# Patient Record
Sex: FEMALE | Race: WHITE | Employment: PART TIME | URBAN - METROPOLITAN AREA
[De-identification: names, ages, dates, MRNs, and addresses within clinical notes are randomized per-mention and may not be internally consistent; named-entity substitution may affect disease eponyms.]

---

## 2017-02-15 ENCOUNTER — TRANSFERRED RECORDS (OUTPATIENT)
Dept: HEALTH INFORMATION MANAGEMENT | Facility: CLINIC | Age: 52
End: 2017-02-15

## 2017-02-16 ENCOUNTER — TRANSFERRED RECORDS (OUTPATIENT)
Dept: HEALTH INFORMATION MANAGEMENT | Facility: CLINIC | Age: 52
End: 2017-02-16

## 2017-04-12 ENCOUNTER — RADIANT APPOINTMENT (OUTPATIENT)
Dept: MAMMOGRAPHY | Facility: CLINIC | Age: 52
End: 2017-04-12
Attending: FAMILY MEDICINE
Payer: COMMERCIAL

## 2017-04-12 DIAGNOSIS — Z12.31 VISIT FOR SCREENING MAMMOGRAM: ICD-10-CM

## 2017-04-12 PROCEDURE — G0202 SCR MAMMO BI INCL CAD: HCPCS | Mod: TC

## 2017-04-28 DIAGNOSIS — Z00.00 LABORATORY EXAMINATION ORDERED AS PART OF A ROUTINE GENERAL MEDICAL EXAMINATION: ICD-10-CM

## 2017-04-28 LAB
BASOPHILS # BLD AUTO: 0 10E9/L (ref 0–0.2)
BASOPHILS NFR BLD AUTO: 0.2 %
DIFFERENTIAL METHOD BLD: NORMAL
EOSINOPHIL # BLD AUTO: 0.2 10E9/L (ref 0–0.7)
EOSINOPHIL NFR BLD AUTO: 2.8 %
ERYTHROCYTE [DISTWIDTH] IN BLOOD BY AUTOMATED COUNT: 12.5 % (ref 10–15)
HBA1C MFR BLD: 4.9 % (ref 4.3–6)
HCT VFR BLD AUTO: 41 % (ref 35–47)
HGB BLD-MCNC: 14.6 G/DL (ref 11.7–15.7)
LYMPHOCYTES # BLD AUTO: 1.5 10E9/L (ref 0.8–5.3)
LYMPHOCYTES NFR BLD AUTO: 26.3 %
MCH RBC QN AUTO: 31.7 PG (ref 26.5–33)
MCHC RBC AUTO-ENTMCNC: 35.6 G/DL (ref 31.5–36.5)
MCV RBC AUTO: 89 FL (ref 78–100)
MONOCYTES # BLD AUTO: 0.8 10E9/L (ref 0–1.3)
MONOCYTES NFR BLD AUTO: 13.1 %
NEUTROPHILS # BLD AUTO: 3.3 10E9/L (ref 1.6–8.3)
NEUTROPHILS NFR BLD AUTO: 57.6 %
PLATELET # BLD AUTO: 320 10E9/L (ref 150–450)
RBC # BLD AUTO: 4.61 10E12/L (ref 3.8–5.2)
T3FREE SERPL-MCNC: 2.9 PG/ML (ref 2.3–4.2)
WBC # BLD AUTO: 5.7 10E9/L (ref 4–11)

## 2017-04-28 PROCEDURE — 80048 BASIC METABOLIC PNL TOTAL CA: CPT | Performed by: FAMILY MEDICINE

## 2017-04-28 PROCEDURE — 84443 ASSAY THYROID STIM HORMONE: CPT | Performed by: FAMILY MEDICINE

## 2017-04-28 PROCEDURE — 80076 HEPATIC FUNCTION PANEL: CPT | Performed by: FAMILY MEDICINE

## 2017-04-28 PROCEDURE — 80061 LIPID PANEL: CPT | Performed by: FAMILY MEDICINE

## 2017-04-28 PROCEDURE — 36415 COLL VENOUS BLD VENIPUNCTURE: CPT | Performed by: FAMILY MEDICINE

## 2017-04-28 PROCEDURE — 84481 FREE ASSAY (FT-3): CPT | Performed by: FAMILY MEDICINE

## 2017-04-28 PROCEDURE — 84439 ASSAY OF FREE THYROXINE: CPT | Performed by: FAMILY MEDICINE

## 2017-04-28 PROCEDURE — 83036 HEMOGLOBIN GLYCOSYLATED A1C: CPT | Performed by: FAMILY MEDICINE

## 2017-04-28 PROCEDURE — 85025 COMPLETE CBC W/AUTO DIFF WBC: CPT | Performed by: FAMILY MEDICINE

## 2017-04-29 LAB
ALBUMIN SERPL-MCNC: 4.2 G/DL (ref 3.4–5)
ALP SERPL-CCNC: 31 U/L (ref 40–150)
ALT SERPL W P-5'-P-CCNC: 17 U/L (ref 0–50)
ANION GAP SERPL CALCULATED.3IONS-SCNC: 11 MMOL/L (ref 3–14)
AST SERPL W P-5'-P-CCNC: 13 U/L (ref 0–45)
BILIRUB DIRECT SERPL-MCNC: 0.1 MG/DL (ref 0–0.2)
BILIRUB SERPL-MCNC: 0.9 MG/DL (ref 0.2–1.3)
BUN SERPL-MCNC: 16 MG/DL (ref 7–30)
CALCIUM SERPL-MCNC: 9.3 MG/DL (ref 8.5–10.1)
CHLORIDE SERPL-SCNC: 102 MMOL/L (ref 94–109)
CHOLEST SERPL-MCNC: 204 MG/DL
CO2 SERPL-SCNC: 26 MMOL/L (ref 20–32)
CREAT SERPL-MCNC: 0.83 MG/DL (ref 0.52–1.04)
GFR SERPL CREATININE-BSD FRML MDRD: 73 ML/MIN/1.7M2
GLUCOSE SERPL-MCNC: 95 MG/DL (ref 70–99)
HDLC SERPL-MCNC: 58 MG/DL
LDLC SERPL CALC-MCNC: 130 MG/DL
NONHDLC SERPL-MCNC: 146 MG/DL
POTASSIUM SERPL-SCNC: 3.6 MMOL/L (ref 3.4–5.3)
PROT SERPL-MCNC: 7.7 G/DL (ref 6.8–8.8)
SODIUM SERPL-SCNC: 139 MMOL/L (ref 133–144)
T4 FREE SERPL-MCNC: 0.87 NG/DL (ref 0.76–1.46)
TRIGL SERPL-MCNC: 78 MG/DL
TSH SERPL DL<=0.05 MIU/L-ACNC: 6.17 MU/L (ref 0.4–4)

## 2017-05-09 ENCOUNTER — OFFICE VISIT (OUTPATIENT)
Dept: OTHER | Facility: CLINIC | Age: 52
End: 2017-05-09
Attending: INTERNAL MEDICINE
Payer: COMMERCIAL

## 2017-05-09 VITALS
SYSTOLIC BLOOD PRESSURE: 105 MMHG | OXYGEN SATURATION: 97 % | HEART RATE: 72 BPM | DIASTOLIC BLOOD PRESSURE: 69 MMHG | WEIGHT: 128 LBS | BODY MASS INDEX: 22.32 KG/M2

## 2017-05-09 DIAGNOSIS — Z00.00 ROUTINE GENERAL MEDICAL EXAMINATION AT A HEALTH CARE FACILITY: Primary | ICD-10-CM

## 2017-05-09 DIAGNOSIS — I10 HYPERTENSION GOAL BP (BLOOD PRESSURE) < 130/80: ICD-10-CM

## 2017-05-09 DIAGNOSIS — Z13.6 CARDIOVASCULAR SCREENING; LDL GOAL LESS THAN 130: ICD-10-CM

## 2017-05-09 DIAGNOSIS — R94.6 BORDERLINE ABNORMAL TFTS: ICD-10-CM

## 2017-05-09 PROCEDURE — 99211 OFF/OP EST MAY X REQ PHY/QHP: CPT

## 2017-05-09 PROCEDURE — 99396 PREV VISIT EST AGE 40-64: CPT | Mod: ZP | Performed by: INTERNAL MEDICINE

## 2017-05-09 RX ORDER — HYDROCHLOROTHIAZIDE 25 MG/1
25 TABLET ORAL EVERY MORNING
Qty: 90 TABLET | Refills: 3 | Status: SHIPPED | OUTPATIENT
Start: 2017-05-09 | End: 2018-04-04

## 2017-05-09 RX ORDER — LISINOPRIL 40 MG/1
40 TABLET ORAL DAILY
Qty: 90 TABLET | Refills: 3 | Status: SHIPPED | OUTPATIENT
Start: 2017-05-09 | End: 2018-03-22

## 2017-05-09 NOTE — MR AVS SNAPSHOT
After Visit Summary   5/9/2017    Paty Johnson    MRN: [id number]           Patient Information     Date Of Birth          1965        Visit Information        Provider Department      5/9/2017 2:00 PM Casa Berg MD [facility]      Today's Diagnoses     Routine general medical examination at a health care facility    -  1    Hypertension goal BP (blood pressure) < 130/80        Asymptomatic borderline abnormal TFTs with normal thyroid U/S        CARDIOVASCULAR SCREENING; LDL GOAL LESS THAN 130           Follow-ups after your visit        Additional Services     Follow-Up with Vascular Medicine                 Your next 10 appointments already scheduled     May 09, 2017  2:00 PM CDT   Return Visit with Casa Berg MD   [facility] ([facility])    [address]  [facility] [id number] [phone]              Future tests that were ordered for you today     Open Future Orders        Priority Expected Expires Ordered    OFFICE/OUTPT VISIT,EST,LEVL III Routine 11/9/2017 12/9/2017 5/9/2017    T4 free Routine 11/9/2017 12/9/2017 5/9/2017    T3 Free Routine 11/9/2017 12/9/2017 5/9/2017    TSH Routine 11/9/2017 12/9/2017 5/9/2017    Follow-Up with Vascular Medicine Routine 11/9/2017 12/9/2017 5/9/2017    Lipoprotein Particle NMR Profile Routine 11/9/2017 12/9/2017 5/9/2017            Who to contact     If you have questions or need follow up information about today's clinic visit or your schedule please contact [facility] directly at [phone].  Normal or non-critical lab and imaging results will be communicated to you by MyChart, letter or phone within 4 business days after the clinic has received the results. If you do not hear from us within 7 days, please contact the clinic through [email or web address]  or phone. If you have a critical or abnormal lab result, we will notify you by phone as soon as possible.  Submit refill requests through Epoque or call your pharmacy and they will forward the refill request to us. Please allow 3 business days for your refill to be completed.          Additional Information About Your Visit        VideoAvatarshart Information     Epoque gives you secure access to your electronic health record. If you see a primary care provider, you can also send messages to your care team and make appointments. If you have questions, please call your primary care clinic.  If you do not have a primary care provider, please call [phone] and they will assist you.        Care EveryWhere ID     This is your Care EveryWhere ID. This could be used by other organizations to access your [place] medical records  [id number]        Your Vitals Were     Pulse Pulse Oximetry Breastfeeding? BMI (Body Mass Index)          72 97% No 22.32 kg/m2         Blood Pressure from Last 3 Encounters:   05/09/17 105/69   12/17/16 120/80   09/13/16 102/76    Weight from Last 3 Encounters:   05/09/17 128 lb (58.1 kg)   12/17/16 127 lb (57.6 kg)   09/13/16 127 lb 11.2 oz (57.9 kg)              We Performed the Following     Follow-Up with Vascular Medicine          Where to get your medicines      These medications were sent to [facility] - [place], [place] - [address] AT [address] [facility]  [address] [facility], [facility] [address]     Phone:  [phone]     hydrochlorothiazide 25 MG tablet    lisinopril 40 MG tablet          Primary Care Provider Office Phone # Fax #    Casa Berg [phone]       [facility] [address]  [place] [address]        Thank you!     Thank you for choosing [facility] [place]  for your care. Our goal is always to provide you with excellent care. Hearing back from our patients is one way we can  continue to improve our services. Please take a few minutes to complete the written survey that you may receive in the mail after your visit with us. Thank you!             Your Updated Medication List - Protect others around you: Learn how to safely use, store and throw away your medicines at www.disposemymeds.org.          This list is accurate as of: 5/9/17  1:46 PM.  Always use your most recent med list.                   Brand Name Dispense Instructions for use    hydrochlorothiazide 25 MG tablet    HYDRODIURIL    90 tablet    Take 1 tablet (25 mg) by mouth every morning       lisinopril 40 MG tablet    PRINIVIL/ZESTRIL    90 tablet    Take 1 tablet (40 mg) by mouth daily

## 2017-05-09 NOTE — PROGRESS NOTES
SUBJECTIVE:    CC: Paty Johnson is a 51 year old female who presents for:       Routine general medical examination at a health care facility  Hypertension goal BP (blood pressure) < 130/80  Borderline abnormal TFTs  CARDIOVASCULAR SCREENING; LDL GOAL LESS THAN 130        PAST MEDICAL HISTORY:                  Past Medical History:   Diagnosis Date     Carcinoma in situ of cervix uteri 1993    treated with Effudex for 10 weeks     Irregular menstrual cycle     no infertility eval or problems     Unspecified essential hypertension 2007       PAST SURGICAL HISTORY:                  Past Surgical History:   Procedure Laterality Date     C NONSPECIFIC PROCEDURE  1993    laser ablation for cervix       CURRENT MEDICATIONS:                  Current Outpatient Prescriptions   Medication Sig Dispense Refill     hydrochlorothiazide (HYDRODIURIL) 25 MG tablet Take 1 tablet (25 mg) by mouth every morning 90 tablet 3     lisinopril (PRINIVIL/ZESTRIL) 40 MG tablet Take 1 tablet (40 mg) by mouth daily 90 tablet 3     [DISCONTINUED] hydrochlorothiazide (HYDRODIURIL) 25 MG tablet Take 1 tablet (25 mg) by mouth every morning 90 tablet 3     [DISCONTINUED] lisinopril (PRINIVIL,ZESTRIL) 40 MG tablet Take 1 tablet (40 mg) by mouth daily 90 tablet 3       ALLERGIES:                  Allergies   Allergen Reactions     Nkda [No Known Drug Allergies]        SOCIAL HISTORY:                  Social History     Social History     Marital status:      Spouse name: Mac     Number of children: 3     Years of education: 16     Occupational History      None       Homemaker     Social History Main Topics     Smoking status: Never Smoker     Smokeless tobacco: Never Used     Alcohol use Yes      Comment: 1-2 drinks per week     Drug use: No     Sexual activity: Yes     Partners: Male     Birth control/ protection: Surgical      Comment:  had a vasectomy      Other Topics Concern     Exercise Yes     Seat Belt Yes      Parent/Sibling W/ Cabg, Mi Or Angioplasty Before 65f 55m? No     Social History Narrative       FAMILY HISTORY:                   Family History   Problem Relation Age of Onset     Arthritis Sister      also has lupus     Respiratory Maternal Grandfather      Breast Cancer Paternal Grandmother      Cancer - colorectal Paternal Grandfather      C.A.D. No family hx of      CEREBROVASCULAR DISEASE No family hx of      DIABETES No family hx of      Endocrine Disease Mother      prediabetes, neuropathy, diet controlled     Hypertension Father      takes meds             CONSTITUTIONAL: no malaise, fatigue, or other general symptoms  EYES: no subjective changes in visual acuity, no photophobia  ENT/MOUTH: no complaints of rhinorrhea, nasal congestion, sore throat, hearing changes  RESP: no SOB  CV: no c/o exertional chest pressure or QUIROZ  GI: No abdominal pain, constipation, change in bowel movements, nausea, pyrosis, BRBPR  : no polyuria or polydipsia, no dysuria, no gross hematuria  MUSCULOSKELETAL: no arthalgias or myalgias  INTEGUMENTARY/SKIN: no pruritis, rashes, or moles with recent change in size, shape, or pigmentation  BREAST: no lumps, masses, or discharges  NEURO: no gross sensory or motor symptoms, no dizziness, no confusion  ENDOCRINE: no polyuria or polydipsia, no heat or cold intolerance  HEME/ALLERGY/IMMUNE: no fevers, chills, night sweats, or unwanted weight loss  PSYCHIATRIC: no depression, anxiety, or internal stimuli    EXAM:    /69 (BP Location: Right arm, Patient Position: Chair, Cuff Size: Adult Regular)  Pulse 72  Wt 128 lb (58.1 kg)  SpO2 97%  Breastfeeding? No  BMI 22.32 kg/m2  BMI: Body mass index is 22.32 kg/(m^2).    GENERAL APPEARANCE:healthy, alert and no distress    ORGAN EXAMS:               EYES: clear conjunctiva, no cataracts, no obvious fundoscopic abnormalities               HENT: oropharynx, nares, and TMs are WNL               NECK: no JVD, thyromegaly or  lymphadenopathy, no cervical bruits               RESP: clear to auscultation without rales, wheezes, or rhonchi               CV: RRR, no murmurs, gallops, or rubs               LYMPH: no cervical , axillary, or inguinal lymphadenopathy appreciated               GI: NABS, ND/NT, no masses or organomegally appreciated               MS: no obvoius clinicallly relevant arthropathy, no evidence vasculitis               SKIN: no nevi clinically suspicious for malignancy are noted               NEURO: CN II-XII intact, no localizing sensory or motor abnoramlities noted, DTRs symmetrical bilaterally               PSYCH: Mental status exam reveals the pt to have normal mood and affect. There is no disorder of thought form or content. There is no response to internal stimuli. There is no suicidal or homicidal ideation.     ASSESSMENT/PLAN    (Z00.00) Routine general medical examination at a health care facility  (primary encounter diagnosis)  Comment: get annual gyn screenings  Plan: rtc one year    (I10) Hypertension goal BP (blood pressure) < 130/80  Comment: at goal  Plan: hydrochlorothiazide (HYDRODIURIL) 25 MG tablet,        lisinopril (PRINIVIL/ZESTRIL) 40 MG tablet,         Follow-Up with Vascular Medicine, Lipoprotein         Particle NMR Profile, OFFICE/OUTPT         VISIT,EST,LEVL III           (R94.6) Asymptomatic borderline abnormal TFTs with normal thyroid U/S  Comment: completely asx, simply monitor  Plan: Follow-Up with Vascular Medicine, T4 free, T3         Free, TSH, OFFICE/OUTPT VISIT,EST,LEVL III           (Z13.6) CARDIOVASCULAR SCREENING; LDL GOAL LESS THAN 130  Comment: LDL is 130, advised to exercise more  Plan: Follow-Up with Vascular Medicine, Lipoprotein         Particle NMR Profile, OFFICE/OUTPT         VISIT,EST,LEVL III            Greater than one half the 15 minutes not spent on preventive care during this visit was spent providing aducation and counselling regarding item(s) # 2 onward as  delineated above.                        I have discussed with patient the risks, benefits, medications, treatment options and modalities.   I have instructed the patient to call or schedule a follow-up appointment if any problems or failure to improve.

## 2017-05-09 NOTE — NURSING NOTE
"Chief Complaint   Patient presents with     RECHECK     follow up labs       Initial /69 (BP Location: Right arm, Patient Position: Chair, Cuff Size: Adult Regular)  Pulse 72  Wt 128 lb (58.1 kg)  SpO2 97%  Breastfeeding? No  BMI 22.32 kg/m2 Estimated body mass index is 22.32 kg/(m^2) as calculated from the following:    Height as of 9/13/16: 5' 3.5\" (1.613 m).    Weight as of this encounter: 128 lb (58.1 kg).  Medication Reconciliation: complete     Face to face nursing time: 8 minutes    Danielle Ren MA     "

## 2017-10-04 ENCOUNTER — TRANSFERRED RECORDS (OUTPATIENT)
Dept: HEALTH INFORMATION MANAGEMENT | Facility: CLINIC | Age: 52
End: 2017-10-04

## 2017-10-09 ENCOUNTER — TRANSFERRED RECORDS (OUTPATIENT)
Dept: HEALTH INFORMATION MANAGEMENT | Facility: CLINIC | Age: 52
End: 2017-10-09

## 2017-10-18 DIAGNOSIS — Z13.6 CARDIOVASCULAR SCREENING; LDL GOAL LESS THAN 130: ICD-10-CM

## 2017-10-18 DIAGNOSIS — R94.6 BORDERLINE ABNORMAL TFTS: ICD-10-CM

## 2017-10-18 DIAGNOSIS — I10 HYPERTENSION GOAL BP (BLOOD PRESSURE) < 130/80: ICD-10-CM

## 2017-10-18 LAB — T3FREE SERPL-MCNC: 2.9 PG/ML (ref 2.3–4.2)

## 2017-10-18 PROCEDURE — 80061 LIPID PANEL: CPT | Mod: 90 | Performed by: FAMILY MEDICINE

## 2017-10-18 PROCEDURE — 99000 SPECIMEN HANDLING OFFICE-LAB: CPT | Performed by: FAMILY MEDICINE

## 2017-10-18 PROCEDURE — 84443 ASSAY THYROID STIM HORMONE: CPT | Performed by: FAMILY MEDICINE

## 2017-10-18 PROCEDURE — 83704 LIPOPROTEIN BLD QUAN PART: CPT | Mod: 90 | Performed by: FAMILY MEDICINE

## 2017-10-18 PROCEDURE — 36415 COLL VENOUS BLD VENIPUNCTURE: CPT | Performed by: FAMILY MEDICINE

## 2017-10-18 PROCEDURE — 84481 FREE ASSAY (FT-3): CPT | Performed by: FAMILY MEDICINE

## 2017-10-18 PROCEDURE — 84439 ASSAY OF FREE THYROXINE: CPT | Performed by: FAMILY MEDICINE

## 2017-10-19 LAB
T4 FREE SERPL-MCNC: 0.82 NG/DL (ref 0.76–1.46)
TSH SERPL DL<=0.005 MIU/L-ACNC: 5.96 MU/L (ref 0.4–4)

## 2017-10-23 ENCOUNTER — TELEPHONE (OUTPATIENT)
Dept: FAMILY MEDICINE | Facility: CLINIC | Age: 52
End: 2017-10-23

## 2017-11-03 ENCOUNTER — TRANSFERRED RECORDS (OUTPATIENT)
Dept: HEALTH INFORMATION MANAGEMENT | Facility: CLINIC | Age: 52
End: 2017-11-03

## 2017-11-09 ENCOUNTER — THERAPY VISIT (OUTPATIENT)
Dept: PHYSICAL THERAPY | Facility: CLINIC | Age: 52
End: 2017-11-09
Payer: COMMERCIAL

## 2017-11-09 DIAGNOSIS — M25.511 ACUTE PAIN OF RIGHT SHOULDER: Primary | ICD-10-CM

## 2017-11-09 DIAGNOSIS — M25.311 SHOULDER INSTABILITY, RIGHT: ICD-10-CM

## 2017-11-09 PROCEDURE — 97112 NEUROMUSCULAR REEDUCATION: CPT | Mod: GP | Performed by: PHYSICAL THERAPIST

## 2017-11-09 PROCEDURE — 97161 PT EVAL LOW COMPLEX 20 MIN: CPT | Mod: GP | Performed by: PHYSICAL THERAPIST

## 2017-11-09 PROCEDURE — 97110 THERAPEUTIC EXERCISES: CPT | Mod: GP | Performed by: PHYSICAL THERAPIST

## 2017-11-09 NOTE — LETTER
[facility]  [address]  [place] [address]  [phone]    November 10, 2017    Re: Paty Johnson   :   1965  MRN:  [id number]   REFERRING PHYSICIAN:   Mike Rodrigez  [facility]  Date of Initial Evaluation:  2017  Visits:  Rxs Used: 1  Reason for Referral:   Acute pain of right shoulder  Shoulder instability, right    EVALUATION SUMMARY    Subjective:  Patient is a 52 year old female presenting with rehab right shoulder hpi.   Paty Johnson is a 52 year old female with a right shoulder condition.    Condition occurred: during recreation/sport.  This is a new condition  17 waterskiing dislocated shoulder .    Patient reports pain:  Posterior.  Radiates to:  Cervical.  Pain is described as sharp and aching and is intermittent and reported as 6/10.  Associated symptoms:  Dislocating/subluxing, tingling and numbness. Pain is worse during the night.  Symptoms are exacerbated by lying on extremity, using arm at shoulder level, using arm overhead and lifting Relieved by: activity avoidance.  Since onset symptoms are gradually improving.  Special tests:  X-ray and MRI.      General health as reported by patient is good.  Pertinent medical history includes:  High blood pressure.  Medical allergies: no.  Other surgeries include:  None reported.  Current medications:  High blood pressure medication.  Current occupation is Part time admin.  Patient is working in normal job without restrictions.  Primary job tasks include:  Prolonged sitting and repetitive tasks.  Barriers include:  None as reported by the patient.  Red flags:  None as reported by the patient.     Objective:   Shoulder Evaluation:  ROM:  AROM:    Flexion:  Left:  180    Right:  165  Abduction:  Right:  Endrange lmitation  Internal Rotation:  Left:  70    Right:  40  External Rotation:  Left:  100    Right:  85  Extension/Internal Rotation:  Left:   T6    Right:  T12    Stability Testing:    Right shoulder stability positive testing:Apprehension and Relocation  Right shoulder stability negative testing:  Load and shift anterior and Load and shift posterior  Special Tests:    Right shoulder positive for the following special tests:Labral and Impingement  Right shoulder negative for the following special tests:Rotator cuff tear and Acrimioclavicular    Assessment/Plan:    Patient is a 52 year old female with right side shoulder complaints.    Patient has the following significant findings with corresponding treatment plan.                Diagnosis 1:  R shoulder instability s/p dislocation  Pain -  hot/cold therapy, manual therapy, education, directional preference exercise and home program  Decreased ROM/flexibility - manual therapy, therapeutic exercise, therapeutic activity and home program  Re: Paty Mehul [place] :   1965    Decreased joint mobility - manual therapy, therapeutic exercise, therapeutic activity and home program  Decreased strength - therapeutic exercise, therapeutic activities and home program  Impaired muscle performance - neuro re-education and home program  Decreased function - therapeutic activities and home program  Therapy Evaluation Codes:   1) History comprised of:   Personal factors that impact the plan of care:      Past/current experiences.    Comorbidity factors that impact the plan of care are:      High blood pressure.     Medications impacting care: High blood pressure.  2) Examination of Body Systems comprised of:   Body structures and functions that impact the plan of care:      Shoulder.   Activity limitations that impact the plan of care are:      Bathing, Cooking, Driving, Dressing, Lifting, Sports, Throwing, Sleeping and Laying down.  3) Clinical presentation characteristics are:   Stable/Uncomplicated.  4) Decision-Making    Moderate complexity using standardized patient assessment instrument and/or measureable  assessment of functional outcome.  Cumulative Therapy Evaluation is: Low complexity.  Previous and current functional limitations:  (See Goal Flow Sheet for this information)    Short term and Long term goals: (See Goal Flow Sheet for this information)   Communication ability:  Patient appears to be able to clearly communicate and understand verbal and written communication and follow directions correctly.  Treatment Explanation - The following has been discussed with the patient:   RX ordered/plan of care. Anticipated outcomes  Possible risks and side effects  This patient would benefit from PT intervention to resume normal activities.   Rehab potential is good.  Frequency:  1-2 X a month, once daily  Duration:  for 1 months  Discharge Plan:  Achieve all LTG.  Independent in home treatment program.  Reach maximal therapeutic benefit.        Thank you for your referral.    INQUIRIES  Therapist: ABDIEL Pineda   [facility]  [address] [place] [address]  Phone: [phone]  Fax: [phone]

## 2017-11-09 NOTE — MR AVS SNAPSHOT
After Visit Summary   11/9/2017    Paty Johnson    MRN: [id number]           Patient Information     Date Of Birth          1965        Visit Information        Provider Department      11/9/2017 10:40 AM Jessica Ledezma PT [facility]        Today's Diagnoses     Acute pain of right shoulder    -  1    Shoulder instability, right           Follow-ups after your visit        Your next 10 appointments already scheduled     Nov 14, 2017  2:30 PM CST   Return Visit with Casa Berg MD   [facility] ([facility])    [address]  [place] [id number] [phone]              Who to contact     If you have questions or need follow up information about today's clinic visit or your schedule please contact [facility] directly at [phone].  Normal or non-critical lab and imaging results will be communicated to you by MyChart, letter or phone within 4 business days after the clinic has received the results. If you do not hear from us within 7 days, please contact the clinic through Wochithart or phone. If you have a critical or abnormal lab result, we will notify you by phone as soon as possible.  Submit refill requests through Skribit or call your pharmacy and they will forward the refill request to us. Please allow 3 business days for your refill to be completed.          Additional Information About Your Visit        MyChart Information     Skribit gives you secure access to your electronic health record. If you see a primary care provider, you can also send messages to your care team and make appointments. If you have questions, please call your primary care clinic.  If you do not have a primary care provider, please call [phone] and they will assist you.        Care EveryWhere ID     This is your Care EveryWhere ID. This could  be used by other organizations to access your [facility] medical records  [id number]         Blood Pressure from Last 3 Encounters:   05/09/17 105/69   12/17/16 120/80   09/13/16 102/76    Weight from Last 3 Encounters:   05/09/17 58.1 kg (128 lb)   12/17/16 57.6 kg (127 lb)   09/13/16 57.9 kg (127 lb 11.2 oz)              We Performed the Following     HC PT EVAL, LOW COMPLEXITY     JEANNIE INITIAL EVAL REPORT     NEUROMUSCULAR RE-EDUCATION     THERAPEUTIC EXERCISES        Primary Care Provider Office Phone # Fax #    Volodymyrserena Eyal Berg [phone]       [facility] [address]  [place] [address]        Equal Access to Services     CECIL ALSTON : Hadii olya brooke hadasho Soomaali, waaxda luqadaha, qaybta kaalmada adeegyada, abraham khan. So [facility] [phone].    ATENCIÓN: Si habla español, tiene a sauceda disposición servicios gratuitos de asistencia lingüística. Llame al [phone].    We comply with applicable federal civil rights laws and [place] laws. We do not discriminate on the basis of race, color, national origin, age, disability, sex, sexual orientation, or gender identity.            Thank you!     Thank you for choosing [facility]  for your care. Our goal is always to provide you with excellent care. Hearing back from our patients is one way we can continue to improve our services. Please take a few minutes to complete the written survey that you may receive in the mail after your visit with us. Thank you!             Your Updated Medication List - Protect others around you: Learn how to safely use, store and throw away your medicines at www.disposemymeds.org.          This list is accurate as of: 11/9/17  2:20 PM.  Always use your most recent med list.                   Brand Name Dispense Instructions for use Diagnosis    hydrochlorothiazide 25 MG tablet    HYDRODIURIL    90 tablet    Take 1 tablet (25 mg) by  mouth every morning    Hypertension goal BP (blood pressure) < 130/80       lisinopril 40 MG tablet    PRINIVIL/ZESTRIL    90 tablet    Take 1 tablet (40 mg) by mouth daily    Hypertension goal BP (blood pressure) < 130/80

## 2017-11-09 NOTE — PROGRESS NOTES
Subjective:    Patient is a 52 year old female presenting with rehab right shoulder hpi.   Paty Johnson is a 52 year old female with a right shoulder condition.    Condition occurred: during recreation/sport.  This is a new condition  9/17/17 waterskiing dislocated shoulder .    Patient reports pain:  Posterior.  Radiates to:  Cervical.  Pain is described as sharp and aching and is intermittent and reported as 6/10.  Associated symptoms:  Dislocating/subluxing, tingling and numbness. Pain is worse during the night.  Symptoms are exacerbated by lying on extremity, using arm at shoulder level, using arm overhead and lifting Relieved by: activity avoidance.  Since onset symptoms are gradually improving.  Special tests:  X-ray and MRI.      General health as reported by patient is good.  Pertinent medical history includes:  High blood pressure.  Medical allergies: no.  Other surgeries include:  None reported.  Current medications:  High blood pressure medication.  Current occupation is Part time admin.  Patient is working in normal job without restrictions.  Primary job tasks include:  Prolonged sitting and repetitive tasks.    Barriers include:  None as reported by the patient.    Red flags:  None as reported by the patient.                        Objective:    System                   Shoulder Evaluation:  ROM:  AROM:    Flexion:  Left:  180    Right:  165    Abduction:  Right:  Endrange lmitation    Internal Rotation:  Left:  70    Right:  40  External Rotation:  Left:  100    Right:  85            Extension/Internal Rotation:  Left:  T6    Right:  T12            Stability Testing:      Right shoulder stability positive testing:Apprehension and Relocation  Right shoulder stability negative testing:  Load and shift anterior and Load and shift posterior  Special Tests:      Right shoulder positive for the following special tests:Labral and Impingement  Right shoulder negative for the following special  tests:Rotator cuff tear and Acrimioclavicular                                       General     ROS    Assessment/Plan:      Patient is a 52 year old female with right side shoulder complaints.    Patient has the following significant findings with corresponding treatment plan.                Diagnosis 1:  R shoulder instability s/p dislocation  Pain -  hot/cold therapy, manual therapy, education, directional preference exercise and home program  Decreased ROM/flexibility - manual therapy, therapeutic exercise, therapeutic activity and home program  Decreased joint mobility - manual therapy, therapeutic exercise, therapeutic activity and home program  Decreased strength - therapeutic exercise, therapeutic activities and home program  Impaired muscle performance - neuro re-education and home program  Decreased function - therapeutic activities and home program    Therapy Evaluation Codes:   1) History comprised of:   Personal factors that impact the plan of care:      Past/current experiences.    Comorbidity factors that impact the plan of care are:      High blood pressure.     Medications impacting care: High blood pressure.  2) Examination of Body Systems comprised of:   Body structures and functions that impact the plan of care:      Shoulder.   Activity limitations that impact the plan of care are:      Bathing, Cooking, Driving, Dressing, Lifting, Sports, Throwing, Sleeping and Laying down.  3) Clinical presentation characteristics are:   Stable/Uncomplicated.  4) Decision-Making    Moderate complexity using standardized patient assessment instrument and/or measureable assessment of functional outcome.  Cumulative Therapy Evaluation is: Low complexity.    Previous and current functional limitations:  (See Goal Flow Sheet for this information)    Short term and Long term goals: (See Goal Flow Sheet for this information)     Communication ability:  Patient appears to be able to clearly communicate and understand  verbal and written communication and follow directions correctly.  Treatment Explanation - The following has been discussed with the patient:   RX ordered/plan of care  Anticipated outcomes  Possible risks and side effects  This patient would benefit from PT intervention to resume normal activities.   Rehab potential is good.    Frequency:  1-2 X a month, once daily  Duration:  for 1 months  Discharge Plan:  Achieve all LTG.  Independent in home treatment program.  Reach maximal therapeutic benefit.    Please refer to the daily flowsheet for treatment today, total treatment time and time spent performing 1:1 timed codes.

## 2017-11-14 ENCOUNTER — OFFICE VISIT (OUTPATIENT)
Dept: OTHER | Facility: CLINIC | Age: 52
End: 2017-11-14
Attending: INTERNAL MEDICINE
Payer: COMMERCIAL

## 2017-11-14 VITALS
HEART RATE: 80 BPM | DIASTOLIC BLOOD PRESSURE: 76 MMHG | BODY MASS INDEX: 22.64 KG/M2 | HEIGHT: 64 IN | OXYGEN SATURATION: 96 % | SYSTOLIC BLOOD PRESSURE: 115 MMHG | WEIGHT: 132.6 LBS

## 2017-11-14 DIAGNOSIS — R94.6 BORDERLINE ABNORMAL TFTS: ICD-10-CM

## 2017-11-14 DIAGNOSIS — I10 HYPERTENSION GOAL BP (BLOOD PRESSURE) < 130/80: ICD-10-CM

## 2017-11-14 DIAGNOSIS — Z13.6 CARDIOVASCULAR SCREENING; LDL GOAL LESS THAN 130: ICD-10-CM

## 2017-11-14 DIAGNOSIS — Z00.00 ROUTINE GENERAL MEDICAL EXAMINATION AT A HEALTH CARE FACILITY: Primary | ICD-10-CM

## 2017-11-14 PROCEDURE — 99211 OFF/OP EST MAY X REQ PHY/QHP: CPT

## 2017-11-14 PROCEDURE — 99214 OFFICE O/P EST MOD 30 MIN: CPT | Mod: ZP | Performed by: INTERNAL MEDICINE

## 2017-11-14 NOTE — PROGRESS NOTES
Paty Johnson is a 52 year old female who is presenting at the current time to discuss her diagnosi(es) of:       Hypertension goal BP (blood pressure) < 130/80  Borderline abnormal TFTs  CARDIOVASCULAR SCREENING; LDL GOAL LESS THAN 130  Routine general medical examination at a health care facility .      Review Of Systems  Skin: negative  Eyes: negative  Ears/Nose/Throat: negative  Respiratory: No shortness of breath, dyspnea on exertion, cough, or hemoptysis  Cardiovascular: negative  Gastrointestinal: negative  Genitourinary: negative  Musculoskeletal: negative  Neurologic: negative  Psychiatric: negative  Hematologic/Lymphatic/Immunologic: negative  Endocrine: negative    PAST MEDICAL HISTORY:                  Past Medical History:   Diagnosis Date     Carcinoma in situ of cervix uteri 1993    treated with Effudex for 10 weeks     Irregular menstrual cycle     no infertility eval or problems     Unspecified essential hypertension 2007       PAST SURGICAL HISTORY:                  Past Surgical History:   Procedure Laterality Date     C NONSPECIFIC PROCEDURE  1993    laser ablation for cervix       CURRENT MEDICATIONS:                  Current Outpatient Prescriptions   Medication Sig Dispense Refill     hydrochlorothiazide (HYDRODIURIL) 25 MG tablet Take 1 tablet (25 mg) by mouth every morning 90 tablet 3     lisinopril (PRINIVIL/ZESTRIL) 40 MG tablet Take 1 tablet (40 mg) by mouth daily 90 tablet 3       ALLERGIES:                  Allergies   Allergen Reactions     Nkda [No Known Drug Allergies]        SOCIAL HISTORY:                  Social History     Social History     Marital status:      Spouse name: Mac     Number of children: 3     Years of education: 16     Occupational History      None       Homemaker     Social History Main Topics     Smoking status: Never Smoker     Smokeless tobacco: Never Used     Alcohol use Yes      Comment: 1-2 drinks per week     Drug use: No      Sexual activity: Yes     Partners: Male     Birth control/ protection: Surgical      Comment:  had a vasectomy      Other Topics Concern     Exercise Yes     Seat Belt Yes     Parent/Sibling W/ Cabg, Mi Or Angioplasty Before 65f 55m? No     Social History Narrative       FAMILY HISTORY:                   Family History   Problem Relation Age of Onset     Arthritis Sister      also has lupus     Respiratory Maternal Grandfather      Breast Cancer Paternal Grandmother      Cancer - colorectal Paternal Grandfather      Endocrine Disease Mother      prediabetes, neuropathy, diet controlled     Hypertension Father      takes meds     C.A.D. No family hx of      CEREBROVASCULAR DISEASE No family hx of      DIABETES No family hx of         Physical exam Reveals:    O/P: WNL  HEENT: WNL  NECK: No JVD, thyromegaly, or lymphadenopathy  HEART: RRR, no murmurs, gallops, or rubs  LUNGS: CTA bilaterally without rales, wheezes, or rhonchi  GI: NABS, nondistended, nontender, soft  EXT:without cyanosis, clubbing, or edema  NEURO: nonfocal  : no flank tenderness      A/P:    (Z00.00) Routine general medical examination at a health care facility  (primary encounter diagnosis)  Comment: RTC in six months for annual physical  Plan: CBC with platelets differential, T3 Free, T4         free, TSH, Basic metabolic panel, Hepatic         panel, Lipid Profile, Hemoglobin A1c, Thyroid         peroxidase antibody, Thyroid stimulating         immunoglobulin, Anti thyroglobulin antibody            (I10) Hypertension goal BP (blood pressure) < 130/80  Comment: at goal  Plan: Basic metabolic panel           (R94.6) Asymptomatic borderline abnormal TFTs with normal thyroid U/S  Comment: TSH still slightly high; Abs normal, imaging normal, will simply monitor with below labs in six months; Pt is asx, pt is instructed to RTC for sxs suggestive of hypothyroidism  Plan: T3 Free, T4 free, TSH, Thyroid peroxidase         antibody, Thyroid  stimulating immunoglobulin,         Anti thyroglobulin antibody           (Z13.6) CARDIOVASCULAR SCREENING; LDL GOAL LESS THAN 130  Comment: at goal  Plan: Lipid Profile

## 2017-11-14 NOTE — NURSING NOTE
"Chief Complaint   Patient presents with     RECHECK     6 month F/U.       Initial /76 (BP Location: Right arm, Patient Position: Chair, Cuff Size: Adult Regular)  Pulse 80  Ht 5' 4\" (1.626 m)  Wt 132 lb 9.6 oz (60.1 kg)  LMP 10/30/2017 (Within Days)  SpO2 96%  BMI 22.76 kg/m2 Estimated body mass index is 22.76 kg/(m^2) as calculated from the following:    Height as of this encounter: 5' 4\" (1.626 m).    Weight as of this encounter: 132 lb 9.6 oz (60.1 kg).  Medication Reconciliation: complete    Face to Face time 5 minutes  Nano Mercado CMA      "

## 2017-11-14 NOTE — MR AVS SNAPSHOT
After Visit Summary   11/14/2017    Paty Johnson    MRN: [id number]           Patient Information     Date Of Birth          1965        Visit Information        Provider Department      11/14/2017 2:30 PM Casa Berg MD [facility]      Today's Diagnoses     Routine general medical examination at a health care facility    -  1    Hypertension goal BP (blood pressure) < 130/80        Asymptomatic borderline abnormal TFTs with normal thyroid U/S        CARDIOVASCULAR SCREENING; LDL GOAL LESS THAN 130           Follow-ups after your visit        Future tests that were ordered for you today     Open Future Orders        Priority Expected Expires Ordered    CBC with platelets differential Routine 5/14/2018 5/14/2018 11/14/2017    T3 Free Routine 5/14/2018 5/14/2018 11/14/2017    T4 free Routine 5/14/2018 5/14/2018 11/14/2017    TSH Routine 5/14/2018 5/14/2018 11/14/2017    Basic metabolic panel Routine 5/14/2018 5/14/2018 11/14/2017    Hepatic panel Routine 5/14/2018 5/14/2018 11/14/2017    Lipid Profile Routine 5/14/2018 5/14/2018 11/14/2017    Hemoglobin A1c Routine 5/14/2018 5/14/2018 11/14/2017    Thyroid peroxidase antibody Routine 5/14/2018 5/14/2018 11/14/2017    Thyroid stimulating immunoglobulin Routine 5/14/2018 5/14/2018 11/14/2017    Anti thyroglobulin antibody Routine 5/14/2018 5/14/2018 11/14/2017            Who to contact     If you have questions or need follow up information about today's clinic visit or your schedule please contact [facility] directly at [phone].  Normal or non-critical lab and imaging results will be communicated to you by MyChart, letter or phone within 4 business days after the clinic has received the results. If you do not hear from us within 7 days, please contact the clinic through MyChart or phone. If you have a critical or abnormal lab result,  "we will notify you by phone as soon as possible.  Submit refill requests through [employer] or call your pharmacy and they will forward the refill request to us. Please allow 3 business days for your refill to be completed.          Additional Information About Your Visit        [employer] Information     Glance gives you secure access to your electronic health record. If you see a primary care provider, you can also send messages to your care team and make appointments. If you have questions, please call your primary care clinic.  If you do not have a primary care provider, please call [phone] and they will assist you.        Care EveryWhere ID     This is your Care EveryWhere ID. This could be used by other organizations to access your [place] medical records  [id number]        Your Vitals Were     Pulse Height Last Period Pulse Oximetry BMI (Body Mass Index)       80 5' 4\" (1.626 m) 10/30/2017 (Within Days) 96% 22.76 kg/m2        Blood Pressure from Last 3 Encounters:   11/14/17 115/76   05/09/17 105/69   12/17/16 120/80    Weight from Last 3 Encounters:   11/14/17 132 lb 9.6 oz (60.1 kg)   05/09/17 128 lb (58.1 kg)   12/17/16 127 lb (57.6 kg)              We Performed the Following     Follow-Up with Vascular Medicine        Primary Care Provider Office Phone # Fax #    Casa Berg [phone]       [facility] [address]  [place] [address]        Equal Access to Services     CECIL ALSTON : Hadii aad ku hadasho Soomaali, waaxda luqadaha, qaybta kaalmada adeegyada, waxay feltonin analilia la . So [facility] [phone].    ATENCIÓN: Si habla yangañol, tiene a sauceda disposición servicios gratuitos de asistencia lingüística. Llame al [phone].    We comply with applicable federal civil rights laws and [place] laws. We do not discriminate on the basis of race, color, national origin, age, disability, sex, sexual orientation, or gender identity.          "   Thank you!     Thank you for choosing [facility]  for your care. Our goal is always to provide you with excellent care. Hearing back from our patients is one way we can continue to improve our services. Please take a few minutes to complete the written survey that you may receive in the mail after your visit with us. Thank you!             Your Updated Medication List - Protect others around you: Learn how to safely use, store and throw away your medicines at www.disposemymeds.org.          This list is accurate as of: 11/14/17  2:51 PM.  Always use your most recent med list.                   Brand Name Dispense Instructions for use Diagnosis    hydrochlorothiazide 25 MG tablet    HYDRODIURIL    90 tablet    Take 1 tablet (25 mg) by mouth every morning    Hypertension goal BP (blood pressure) < 130/80       lisinopril 40 MG tablet    PRINIVIL/ZESTRIL    90 tablet    Take 1 tablet (40 mg) by mouth daily    Hypertension goal BP (blood pressure) < 130/80

## 2017-11-18 ENCOUNTER — HEALTH MAINTENANCE LETTER (OUTPATIENT)
Age: 52
End: 2017-11-18

## 2017-12-04 ENCOUNTER — THERAPY VISIT (OUTPATIENT)
Dept: PHYSICAL THERAPY | Facility: CLINIC | Age: 52
End: 2017-12-04
Payer: COMMERCIAL

## 2017-12-04 DIAGNOSIS — M25.511 ACUTE PAIN OF RIGHT SHOULDER: ICD-10-CM

## 2017-12-04 DIAGNOSIS — M25.311 SHOULDER INSTABILITY, RIGHT: ICD-10-CM

## 2017-12-04 PROCEDURE — 97110 THERAPEUTIC EXERCISES: CPT | Mod: GP | Performed by: PHYSICAL THERAPIST

## 2017-12-04 PROCEDURE — 97112 NEUROMUSCULAR REEDUCATION: CPT | Mod: GP | Performed by: PHYSICAL THERAPIST

## 2017-12-15 ENCOUNTER — TRANSFERRED RECORDS (OUTPATIENT)
Dept: HEALTH INFORMATION MANAGEMENT | Facility: CLINIC | Age: 52
End: 2017-12-15

## 2017-12-19 ENCOUNTER — THERAPY VISIT (OUTPATIENT)
Dept: PHYSICAL THERAPY | Facility: CLINIC | Age: 52
End: 2017-12-19
Payer: COMMERCIAL

## 2017-12-19 DIAGNOSIS — M25.311 SHOULDER INSTABILITY, RIGHT: ICD-10-CM

## 2017-12-19 DIAGNOSIS — M25.511 ACUTE PAIN OF RIGHT SHOULDER: ICD-10-CM

## 2017-12-19 PROCEDURE — 97110 THERAPEUTIC EXERCISES: CPT | Mod: GP | Performed by: PHYSICAL THERAPIST

## 2017-12-19 PROCEDURE — 97112 NEUROMUSCULAR REEDUCATION: CPT | Mod: GP | Performed by: PHYSICAL THERAPIST

## 2017-12-19 NOTE — MR AVS SNAPSHOT
After Visit Summary   12/19/2017    Paty Johnson    MRN: [id number]           Patient Information     Date Of Birth          1965        Visit Information        Provider Department      12/19/2017 10:30 AM Jessica Ledezma PT [facility]        Today's Diagnoses     Acute pain of right shoulder        Shoulder instability, right           Follow-ups after your visit        Who to contact     If you have questions or need follow up information about today's clinic visit or your schedule please contact [place] [facility] DARRIAN AGU[facility] directly at [phone].  Normal or non-critical lab and imaging results will be communicated to you by Weij[employer]art, letter or phone within 4 business days after the clinic has received the results. If you do not hear from us within 7 days, please contact the clinic through Weijuhart or phone. If you have a critical or abnormal lab result, we will notify you by phone as soon as possible.  Submit refill requests through [employer] or call your pharmacy and they will forward the refill request to us. Please allow 3 business days for your refill to be completed.          Additional Information About Your Visit        MyChart Information     [employer] gives you secure access to your electronic health record. If you see a primary care provider, you can also send messages to your care team and make appointments. If you have questions, please call your primary care clinic.  If you do not have a primary care provider, please call [phone] and they will assist you.        Care EveryWhere ID     This is your Care EveryWhere ID. This could be used by other organizations to access your [place] medical records  [id number]         Blood Pressure from Last 3 Encounters:   11/14/17 115/76   05/09/17 105/69   12/17/16 120/80    Weight from Last 3 Encounters:   11/14/17 60.1 kg (132 lb 9.6 oz)   05/09/17 58.1 kg (128 lb)   12/17/16 57.6 kg  (127 lb)              We Performed the Following     NEUROMUSCULAR RE-EDUCATION     THERAPEUTIC EXERCISES        Primary Care Provider Office Phone # Fax #    Casa Berg [phone]       [facility] [address]  [place] [address]        Equal Access to Services     AMERICOYOEL GAMALIEL : Hadii aad ku hadasho Soomaali, waaxda luqadaha, qaybta kaalmada adeegyada, waxay idiin hayamandan clifford mercedesstarr lavelia . So [facility] [phone].    ATENCIÓN: Si habla español, tiene a sauceda disposición servicios gratuitos de asistencia lingüística. Venita al [phone].    We comply with applicable federal civil rights laws and [place] laws. We do not discriminate on the basis of race, color, national origin, age, disability, sex, sexual orientation, or gender identity.            Thank you!     Thank you for choosing [place] [facility]  for your care. Our goal is always to provide you with excellent care. Hearing back from our patients is one way we can continue to improve our services. Please take a few minutes to complete the written survey that you may receive in the mail after your visit with us. Thank you!             Your Updated Medication List - Protect others around you: Learn how to safely use, store and throw away your medicines at www.disposemymeds.org.          This list is accurate as of: 12/19/17 12:30 PM.  Always use your most recent med list.                   Brand Name Dispense Instructions for use Diagnosis    hydrochlorothiazide 25 MG tablet    HYDRODIURIL    90 tablet    Take 1 tablet (25 mg) by mouth every morning    Hypertension goal BP (blood pressure) < 130/80       lisinopril 40 MG tablet    PRINIVIL/ZESTRIL    90 tablet    Take 1 tablet (40 mg) by mouth daily    Hypertension goal BP (blood pressure) < 130/80

## 2018-01-15 ENCOUNTER — OFFICE VISIT (OUTPATIENT)
Dept: FAMILY MEDICINE | Facility: CLINIC | Age: 53
End: 2018-01-15
Payer: COMMERCIAL

## 2018-01-15 VITALS
TEMPERATURE: 98.7 F | HEIGHT: 66 IN | DIASTOLIC BLOOD PRESSURE: 66 MMHG | WEIGHT: 131 LBS | RESPIRATION RATE: 16 BRPM | BODY MASS INDEX: 21.05 KG/M2 | SYSTOLIC BLOOD PRESSURE: 110 MMHG | HEART RATE: 64 BPM | OXYGEN SATURATION: 98 %

## 2018-01-15 DIAGNOSIS — M25.311 SHOULDER INSTABILITY, RIGHT: ICD-10-CM

## 2018-01-15 DIAGNOSIS — Z00.00 ROUTINE GENERAL MEDICAL EXAMINATION AT A HEALTH CARE FACILITY: ICD-10-CM

## 2018-01-15 DIAGNOSIS — Z01.818 PREOP GENERAL PHYSICAL EXAM: Primary | ICD-10-CM

## 2018-01-15 DIAGNOSIS — I10 HYPERTENSION GOAL BP (BLOOD PRESSURE) < 130/80: ICD-10-CM

## 2018-01-15 LAB
ANION GAP SERPL CALCULATED.3IONS-SCNC: 6 MMOL/L (ref 3–14)
BUN SERPL-MCNC: 17 MG/DL (ref 7–30)
CALCIUM SERPL-MCNC: 9.1 MG/DL (ref 8.5–10.1)
CHLORIDE SERPL-SCNC: 102 MMOL/L (ref 94–109)
CO2 SERPL-SCNC: 30 MMOL/L (ref 20–32)
CREAT SERPL-MCNC: 0.83 MG/DL (ref 0.52–1.04)
GFR SERPL CREATININE-BSD FRML MDRD: 72 ML/MIN/1.7M2
GLUCOSE SERPL-MCNC: 85 MG/DL (ref 70–99)
POTASSIUM SERPL-SCNC: 3.4 MMOL/L (ref 3.4–5.3)
SODIUM SERPL-SCNC: 138 MMOL/L (ref 133–144)

## 2018-01-15 PROCEDURE — 80048 BASIC METABOLIC PNL TOTAL CA: CPT | Performed by: INTERNAL MEDICINE

## 2018-01-15 PROCEDURE — 36415 COLL VENOUS BLD VENIPUNCTURE: CPT | Performed by: INTERNAL MEDICINE

## 2018-01-15 PROCEDURE — 93000 ELECTROCARDIOGRAM COMPLETE: CPT | Performed by: FAMILY MEDICINE

## 2018-01-15 PROCEDURE — 99214 OFFICE O/P EST MOD 30 MIN: CPT | Performed by: FAMILY MEDICINE

## 2018-01-15 NOTE — MR AVS SNAPSHOT
After Visit Summary   1/15/2018    Paty Johnson    MRN: [id number]           Patient Information     Date Of Birth          1965        Visit Information        Provider Department      1/15/2018 9:20 AM Freod Ward MD [facility]        Today's Diagnoses     Preop general physical exam    -  1    Shoulder instability, right        Hypertension goal BP (blood pressure) < 130/80        Routine general medical examination at a health care facility          Care Instructions      Before Your Surgery      Call your surgeon if there is any change in your health. This includes signs of a cold or flu (such as a sore throat, runny nose, cough, rash or fever).    Do not smoke, drink alcohol or take over the counter medicine (unless your surgeon or primary care doctor tells you to) for the 24 hours before and after surgery.    If you take prescribed drugs: Follow your doctor s orders about which medicines to take and which to stop until after surgery.    Eating and drinking prior to surgery: follow the instructions from your surgeon    Take a shower or bath the night before surgery. Use the soap your surgeon gave you to gently clean your skin. If you do not have soap from your surgeon, use your regular soap. Do not shave or scrub the surgery site.  Wear clean pajamas and have clean sheets on your bed.           Follow-ups after your visit        Follow-up notes from your care team     Return in about 6 months (around 7/15/2018), or if symptoms worsen or fail to improve.      Who to contact     If you have questions or need follow up information about today's clinic visit or your schedule please contact [facility] directly at [phone].  Normal or non-critical lab and imaging results will be communicated to you by MyChart, letter or phone within 4 business days after the clinic has received the results. If you do not hear from us within 7 days, please  "contact the clinic through [employer] or phone. If you have a critical or abnormal lab result, we will notify you by phone as soon as possible.  Submit refill requests through [employer] or call your pharmacy and they will forward the refill request to us. Please allow 3 business days for your refill to be completed.          Additional Information About Your Visit        [employer] Information     [employer] gives you secure access to your electronic health record. If you see a primary care provider, you can also send messages to your care team and make appointments. If you have questions, please call your primary care clinic.  If you do not have a primary care provider, please call [phone] and they will assist you.        Care EveryWhere ID     This is your Care EveryWhere ID. This could be used by other organizations to access your [place] medical records  [id number]        Your Vitals Were     Pulse Temperature Respirations Height Pulse Oximetry BMI (Body Mass Index)    64 98.7  F (37.1  C) (Oral) 16 5' 5.5\" (1.664 m) 98% 21.47 kg/m2       Blood Pressure from Last 3 Encounters:   01/15/18 110/66   11/14/17 115/76   05/09/17 105/69    Weight from Last 3 Encounters:   01/15/18 131 lb (59.4 kg)   11/14/17 132 lb 9.6 oz (60.1 kg)   05/09/17 128 lb (58.1 kg)              We Performed the Following     Basic metabolic panel     EKG 12-lead complete w/read - Clinics        Primary Care Provider Office Phone # Fax #    Casa Eyal Berg [phone]       [facility] [address]  [place] [address]        Equal Access to Services     CECIL ALSTON : Hadii olya Alarcon, yolande palencia, qaybabraham kelly. So [facility] [phone].    ATENCIÓN: Si habla español, tiene a sauceda disposición servicios gratuitos de asistencia lingüística. Venita al [phone].    We comply with applicable federal civil rights laws and [place] laws. We do " not discriminate on the basis of race, color, national origin, age, disability, sex, sexual orientation, or gender identity.            Thank you!     Thank you for choosing [facility]  for your care. Our goal is always to provide you with excellent care. Hearing back from our patients is one way we can continue to improve our services. Please take a few minutes to complete the written survey that you may receive in the mail after your visit with us. Thank you!             Your Updated Medication List - Protect others around you: Learn how to safely use, store and throw away your medicines at www.disposemymeds.org.          This list is accurate as of: 1/15/18 10:05 AM.  Always use your most recent med list.                   Brand Name Dispense Instructions for use Diagnosis    hydrochlorothiazide 25 MG tablet    HYDRODIURIL    90 tablet    Take 1 tablet (25 mg) by mouth every morning    Hypertension goal BP (blood pressure) < 130/80       lisinopril 40 MG tablet    PRINIVIL/ZESTRIL    90 tablet    Take 1 tablet (40 mg) by mouth daily    Hypertension goal BP (blood pressure) < 130/80

## 2018-01-15 NOTE — PROGRESS NOTES
[address], [address]  [facility] [address]  [phone]  Dept: [phone]    PRE-OP EVALUATION:  Today's date: 1/15/2018    Paty Johnson (: 1965) presents for pre-operative evaluation assessment as requested by Dr. Carl Rodrigez.  She requires evaluation and anesthesia risk assessment prior to undergoing surgery/procedure for treatment of Right Labrum Repair .  Proposed procedure: Right Labrum Repair.    Date of Surgery/ Procedure: 2018  Time of Surgery/ Procedure: 6:45 am  Hospital/Surgical Facility: [facility]. [place]  Primary Physician: Casa Berg  Type of Anesthesia Anticipated: to be determined    Patient has a Health Care Directive or Living Will:  YES,  has copies.     1. NO - Do you have a history of heart attack, stroke, stent, bypass or surgery on an artery in the head, neck, heart or legs?  2. NO - Do you ever have any pain or discomfort in your chest?  3. NO - Do you have a history of  Heart Failure?  4. NO - Are you troubled by shortness of breath when: walking on the level, up a slight hill or at night?  5. NO - Do you currently have a cold, bronchitis or other respiratory infection?  6. NO - Do you have a cough, shortness of breath or wheezing?  7. NO - Do you sometimes get pains in the calves of your legs when you walk?  8. NO - Do you or anyone in your family have previous history of blood clots?  9. NO - Do you or does anyone in your family have a serious bleeding problem such as prolonged bleeding following surgeries or cuts?  10. NO - Have you ever had problems with anemia or been told to take iron pills?  11. NO - Have you had any abnormal blood loss such as black, tarry or bloody stools, or abnormal vaginal bleeding?  12. NO - Have you ever had a blood transfusion?  13. NO - Have you or any of your relatives ever had problems with anesthesia?  14. NO - Do you  have sleep apnea, excessive snoring or daytime drowsiness?  15. NO - Do you have any prosthetic heart valves?  16. NO - Do you have prosthetic joints?  17. NO - Is there any chance that you may be pregnant?        HPI:                                                      Brief HPI related to upcoming procedure: longstanding R shoulder pain.  Generally tolerates anesthesia well apart from nausea.  Feelilng well, no congestion, cough, fever.  No CP, dyspnea, n/v, dysuria.      See problem list for active medical problems.  Problems all longstanding and stable, except as noted/documented.  See ROS for pertinent symptoms related to these conditions.                                                                                                  .    MEDICAL HISTORY:                                                    Patient Active Problem List    Diagnosis Date Noted     Acute pain of right shoulder 11/09/2017     Priority: Medium     Shoulder instability, right 11/09/2017     Priority: Medium     Labial skin tag 02/25/2014     Priority: Medium     Hypertension goal BP (blood pressure) < 130/80 08/26/2013     Priority: Medium     CARDIOVASCULAR SCREENING; LDL GOAL LESS THAN 130 08/26/2013     Priority: Medium     Abnormal blood chemistry 08/26/2013     Priority: Medium     Problem list name updated by automated process. Provider to review       ASCUS on Pap smear 09/18/2012     Priority: Medium     09/18/12 Pap= ASCUS, Neg HPV. Repeat pap with co-testing in 3 yrs per ASCCP guidelines. Due 09/2015 09/13/16 Pap= NIL, Neg HPV.        Past Medical History:   Diagnosis Date     Carcinoma in situ of cervix uteri 1993    treated with Effudex for 10 weeks     Irregular menstrual cycle     no infertility eval or problems     Unspecified essential hypertension 2007     Past Surgical History:   Procedure Laterality Date     C NONSPECIFIC PROCEDURE  1993    laser ablation for cervix     Current Outpatient Prescriptions  "  Medication Sig Dispense Refill     hydrochlorothiazide (HYDRODIURIL) 25 MG tablet Take 1 tablet (25 mg) by mouth every morning 90 tablet 3     lisinopril (PRINIVIL/ZESTRIL) 40 MG tablet Take 1 tablet (40 mg) by mouth daily 90 tablet 3     OTC products: no recent use of OTC ASA, NSAIDS or Steroids    Allergies   Allergen Reactions     Nkda [No Known Drug Allergies]       Latex Allergy: NO    Social History   Substance Use Topics     Smoking status: Never Smoker     Smokeless tobacco: Never Used     Alcohol use Yes      Comment: 1-2 drinks per week     History   Drug Use No       REVIEW OF SYSTEMS:                                                    C: NEGATIVE for fever, chills, change in weight  E/M: NEGATIVE for ear, mouth and throat problems  R: NEGATIVE for significant cough or SOB  CV: NEGATIVE for chest pain, palpitations or peripheral edema    EXAM:                                                    /66 (BP Location: Right arm, Patient Position: Sitting, Cuff Size: Adult Regular)  Pulse 64  Temp 98.7  F (37.1  C) (Oral)  Resp 16  Ht 5' 5.5\" (1.664 m)  Wt 131 lb (59.4 kg)  SpO2 98%  BMI 21.47 kg/m2  GENERAL APPEARANCE: healthy, alert and no distress  HENT: ear canals and TM's normal and nose and mouth without ulcers or lesions  RESP: lungs clear to auscultation - no rales, rhonchi or wheezes  CV: regular rate and rhythm, normal S1 S2, no S3 or S4 and no murmur, click or rub   ABDOMEN: soft, nontender, no HSM or masses and bowel sounds normal  NEURO: Normal strength and tone, sensory exam grossly normal, mentation intact and speech normal    DIAGNOSTICS:                                                    EKG: appears normal, NSR, normal axis, normal intervals, no acute ST/T changes c/w ischemia, no LVH by voltage criteria, unchanged from previous tracings  Serum Potassium    Recent Labs   Lab Test  04/28/17   0851  03/08/16   0846   HGB  14.6  14.6   PLT  320  253   NA  139  138   POTASSIUM  3.6  " 4.3   CR  0.83  0.84   A1C  4.9  5.2        IMPRESSION:                                                    Reason for surgery/procedure: R shoulder labrum tear  Diagnosis/reason for consult: preopetative clearance    The proposed surgical procedure is considered LOW risk.    REVISED CARDIAC RISK INDEX  The patient has the following serious cardiovascular risks for perioperative complications such as (MI, PE, VFib and 3  AV Block):  No serious cardiac risks  INTERPRETATION: 0 risks: Class I (very low risk - 0.4% complication rate)    The patient has the following additional risks for perioperative complications:  No identified additional risks      ICD-10-CM    1. Preop general physical exam Z01.818 EKG 12-lead complete w/read - Clinics       RECOMMENDATIONS:                                                      --Consult hospital rounder / IM to assist post-op medical management    --Patient is to take all scheduled medications on the day of surgery EXCEPT for modifications listed below.    APPROVAL GIVEN to proceed with proposed procedure, without further diagnostic evaluation       Signed Electronically by: Fredo Ward MD    Copy of this evaluation report is provided to requesting physician.    [facility] Preop Guidelines

## 2018-01-22 PROBLEM — M25.511 ACUTE PAIN OF RIGHT SHOULDER: Status: RESOLVED | Noted: 2017-11-09 | Resolved: 2018-01-22

## 2018-01-22 PROBLEM — M25.311 SHOULDER INSTABILITY, RIGHT: Status: RESOLVED | Noted: 2017-11-09 | Resolved: 2018-01-22

## 2018-01-22 NOTE — PROGRESS NOTES
Subjective:  HPI                    Objective:  System    Physical Exam    General     ROS    Assessment/Plan:    DISCHARGE REPORT    Progress reporting period is from 11/9/17 to 12/19/17.       SUBJECTIVE  Subjective changes noted by patient: reports that she dr Elia for January and not sure if she is going to have it, reports that arm was OK until she overdid activity, over the weekend her dad fell and she was trying to help lift him up off ground, overall shoulder gets achy    Current pain level is NA  .     Previous pain level was  NA  .   Changes in function:  Yes (See Goal flowsheet attached for changes in current functional level)  Adverse reaction to treatment or activity: None    OBJECTIVE  Changes noted in objective findings:  Patient has failed to return to therapy so current objective findings are unknown.  Objective: flex 4/5, scapt 4+/5, ER 4+/5, IR 5-/5, pain w/ flexion and ext/IR     ASSESSMENT/PLAN  Updated problem list and treatment plan: Diagnosis 1:  Shoulder pain   STG/LTGs have been met or progress has been made towards goals:  Yes (See Goal flow sheet completed today.)  Assessment of Progress: The patient has not returned to therapy. Current status is unknown.  Self Management Plans:  Patient has been instructed in a home treatment program.  Patient  has been instructed in self management of symptoms.  I have re-evaluated this patient and find that the nature, scope, duration and intensity of the therapy is appropriate for the medical condition of the patient.  Paty continues to require the following intervention to meet STG and LTG's:  PT intervention is no longer required to meet STG/LTG.    Recommendations:  Patient failed to return to PT for further treatment after last visit.  Therefore, patient will be discharged at this time.      Please refer to the daily flowsheet for treatment today, total treatment time and time spent performing 1:1 timed codes.

## 2018-01-24 ENCOUNTER — TRANSFERRED RECORDS (OUTPATIENT)
Dept: HEALTH INFORMATION MANAGEMENT | Facility: CLINIC | Age: 53
End: 2018-01-24

## 2018-02-05 ENCOUNTER — TRANSFERRED RECORDS (OUTPATIENT)
Dept: HEALTH INFORMATION MANAGEMENT | Facility: CLINIC | Age: 53
End: 2018-02-05

## 2018-02-09 ENCOUNTER — THERAPY VISIT (OUTPATIENT)
Dept: PHYSICAL THERAPY | Facility: CLINIC | Age: 53
End: 2018-02-09
Payer: COMMERCIAL

## 2018-02-09 DIAGNOSIS — Z98.890 S/P ARTHROSCOPY OF SHOULDER: ICD-10-CM

## 2018-02-09 DIAGNOSIS — M25.511 ACUTE PAIN OF RIGHT SHOULDER: Primary | ICD-10-CM

## 2018-02-09 PROCEDURE — 97161 PT EVAL LOW COMPLEX 20 MIN: CPT | Mod: GP | Performed by: PHYSICAL THERAPIST

## 2018-02-09 PROCEDURE — 97110 THERAPEUTIC EXERCISES: CPT | Mod: GP | Performed by: PHYSICAL THERAPIST

## 2018-02-09 NOTE — MR AVS SNAPSHOT
After Visit Summary   2/9/2018    Paty Johnson    MRN: [id number]           Patient Information     Date Of Birth          1965        Visit Information        Provider Department      2/9/2018 10:20 AM Jessica Ledezma, PT [facility]        Today's Diagnoses     Acute pain of right shoulder    -  1    S/P arthroscopy of shoulder           Follow-ups after your visit        Your next 10 appointments already scheduled     Feb 16, 2018  8:20 AM CST   JEANNIE Extremity with Jessica Ledezma PT   [facility] (JEANNIE [place])    [address]  [place] [address]   [phone]            Feb 23, 2018  9:00 AM CST   JEANNIE Extremity with Jessica Ledezma PT   [facility] ([facility] [place])    [address]  [place] [address]   [phone]            Mar 09, 2018  9:40 AM CST   JEANNIE Extremity with Jessica Ledezma PT   [facility] ([facility] [place])    [address]  [place] [address]   [phone]              Who to contact     If you have questions or need follow up information about today's clinic visit or your schedule please contact [facility] directly at [phone].  Normal or non-critical lab and imaging results will be communicated to you by [employer], letter or phone within 4 business days after the clinic has received the results. If you do not hear from us within 7 days, please contact the clinic through [employer] or phone. If you have a critical or abnormal lab result, we will notify you by phone as soon as possible.  Submit refill requests through [employer] or call your pharmacy and they will forward the refill request to us. Please allow 3 business days for your refill to be completed.          Additional Information About Your Visit        [employer] Information     [employer] gives you secure access to your electronic  health record. If you see a primary care provider, you can also send messages to your care team and make appointments. If you have questions, please call your primary care clinic.  If you do not have a primary care provider, please call [phone] and they will assist you.        Care EveryWhere ID     This is your Care EveryWhere ID. This could be used by other organizations to access your [facility] medical records  [id number]         Blood Pressure from Last 3 Encounters:   01/15/18 110/66   11/14/17 115/76   05/09/17 105/69    Weight from Last 3 Encounters:   01/15/18 59.4 kg (131 lb)   11/14/17 60.1 kg (132 lb 9.6 oz)   05/09/17 58.1 kg (128 lb)              We Performed the Following     HC PT EVAL, LOW COMPLEXITY     JEANNIE INITIAL EVAL REPORT     THERAPEUTIC EXERCISES        Primary Care Provider Office Phone # Fax #    [facility] [phone]       [facility] [address]  [place] [address]        Equal Access to Services     : Hadii aad ku hadasho Soomaali, waaxda luqadaha, qaybta kaalmada adeegyada, waxay nurys la . So [facility] [phone].    ATENCIÓN: Si habla español, tiene a sauceda disposición servicios gratuitos de asistencia lingüística. Llame al [phone].    We comply with applicable federal civil rights laws and [place] laws. We do not discriminate on the basis of race, color, national origin, age, disability, sex, sexual orientation, or gender identity.            Thank you!     Thank you for choosing [facility]  for your care. Our goal is always to provide you with excellent care. Hearing back from our patients is one way we can continue to improve our services. Please take a few minutes to complete the written survey that you may receive in the mail after your visit with us. Thank you!             Your Updated Medication List - Protect others around you: Learn how to safely use,  store and throw away your medicines at www.disposemymeds.org.          This list is accurate as of 2/9/18  2:19 PM.  Always use your most recent med list.                   Brand Name Dispense Instructions for use Diagnosis    hydrochlorothiazide 25 MG tablet    HYDRODIURIL    90 tablet    Take 1 tablet (25 mg) by mouth every morning    Hypertension goal BP (blood pressure) < 130/80       lisinopril 40 MG tablet    PRINIVIL/ZESTRIL    90 tablet    Take 1 tablet (40 mg) by mouth daily    Hypertension goal BP (blood pressure) < 130/80

## 2018-02-09 NOTE — PROGRESS NOTES
[facility] Initial Evaluation  Subjective:  Patient is a 52 year old female presenting with rehab right shoulder hpi.   Paty Johnson is a 52 year old female with a right shoulder condition.    Condition occurred: during recreation/sport.  This is a new condition  9/17/17 waterskiing dislocated shoulder   Underwent surgery 1/18/18.    Patient reports pain:  Lateral.    Pain is described as sharp and aching  and reported as 6/10 and 3/10.  Associated symptoms:  Loss of motion/stiffness. Pain is worse during the night.  Symptoms are exacerbated by lying on extremity, using arm at shoulder level, using arm overhead, lifting, using arm behind back, certain positions and carrying and relieved by ice and bracing/immobilizing (activity avoidance).  Since onset symptoms are gradually improving.  Special tests:  X-ray and MRI.      General health as reported by patient is excellent.  Pertinent medical history includes:  High blood pressure.  Medical allergies: no.  Other surgeries include:  Orthopedic surgery.  Current medications:  High blood pressure medication.  Current occupation is Part time admin- book-keeper.  Patient is working in normal job without restrictions.  Primary job tasks include:  Prolonged sitting and repetitive tasks.    Barriers include:  None as reported by the patient.    Red flags:  None as reported by the patient.                        Objective:  System                   Shoulder Evaluation:  ROM:  AROM:  not assessed                            PROM:  : slight limitation in wrist supination at endrange, slight weakness of R  w/ B comparison.  Flexion:  Right: 90      Abduction:  Right:  70    Internal Rotation:  Right:  30  External Rotation:  Right:  40      Elbow Flexion:  Right:  WNL  Elbow Extension:  Right:  WNL            Strength:  not assessed                      Stability Testing:  not assessed      Special Tests:  not assessed      Palpation:  not  assessed                                         General     ROS    Assessment/Plan:    Patient is a 52 year old female with right side shoulder complaints.    Patient has the following significant findings with corresponding treatment plan.                Diagnosis 1:  S/p posterior GH capsule repair  Pain -  hot/cold therapy, manual therapy, education, directional preference exercise and home program  Decreased ROM/flexibility - manual therapy, therapeutic exercise and home program  Decreased strength - therapeutic exercise, therapeutic activities and home program  Impaired muscle performance - neuro re-education and home program  Decreased function - therapeutic activities and home program    Therapy Evaluation Codes:   1) History comprised of:   Personal factors that impact the plan of care:      Time since onset of symptoms.    Comorbidity factors that impact the plan of care are:      High blood pressure.     Medications impacting care: High blood pressure.  2) Examination of Body Systems comprised of:   Body structures and functions that impact the plan of care:      Shoulder.   Activity limitations that impact the plan of care are:      Bathing, Cooking, Driving, Dressing, Grasping, Lifting, Reading/Computer work, Working, Sleeping and Laying down.  3) Clinical presentation characteristics are:   Stable/Uncomplicated.  4) Decision-Making    High complexity using standardized patient assessment instrument and/or measureable assessment of functional outcome.  Cumulative Therapy Evaluation is: Low complexity.    Previous and current functional limitations:  (See Goal Flow Sheet for this information)    Short term and Long term goals: (See Goal Flow Sheet for this information)     Communication ability:  Patient appears to be able to clearly communicate and understand verbal and written communication and follow directions correctly.  Treatment Explanation - The following has been discussed with the patient:   RX  ordered/plan of care  Anticipated outcomes  Possible risks and side effects  This patient would benefit from PT intervention to resume normal activities.   Rehab potential is excellent.    Frequency:  1 X week, once daily  Duration:  for 3 weeks tapering to 2 X a month over 12 weeks  Discharge Plan:  Achieve all LTG.  Independent in home treatment program.  Reach maximal therapeutic benefit.    Please refer to the daily flowsheet for treatment today, total treatment time and time spent performing 1:1 timed codes.

## 2018-02-16 ENCOUNTER — THERAPY VISIT (OUTPATIENT)
Dept: PHYSICAL THERAPY | Facility: CLINIC | Age: 53
End: 2018-02-16
Payer: COMMERCIAL

## 2018-02-16 DIAGNOSIS — M25.511 ACUTE PAIN OF RIGHT SHOULDER: ICD-10-CM

## 2018-02-16 DIAGNOSIS — Z98.890 S/P ARTHROSCOPY OF SHOULDER: ICD-10-CM

## 2018-02-16 PROCEDURE — 97110 THERAPEUTIC EXERCISES: CPT | Mod: GP | Performed by: PHYSICAL THERAPIST

## 2018-02-23 ENCOUNTER — THERAPY VISIT (OUTPATIENT)
Dept: PHYSICAL THERAPY | Facility: CLINIC | Age: 53
End: 2018-02-23
Payer: COMMERCIAL

## 2018-02-23 DIAGNOSIS — Z98.890 S/P ARTHROSCOPY OF SHOULDER: ICD-10-CM

## 2018-02-23 DIAGNOSIS — M25.511 ACUTE PAIN OF RIGHT SHOULDER: ICD-10-CM

## 2018-02-23 PROCEDURE — 97112 NEUROMUSCULAR REEDUCATION: CPT | Mod: GP | Performed by: PHYSICAL THERAPIST

## 2018-02-23 PROCEDURE — 97110 THERAPEUTIC EXERCISES: CPT | Mod: GP | Performed by: PHYSICAL THERAPIST

## 2018-02-26 ENCOUNTER — TRANSFERRED RECORDS (OUTPATIENT)
Dept: HEALTH INFORMATION MANAGEMENT | Facility: CLINIC | Age: 53
End: 2018-02-26

## 2018-03-02 ENCOUNTER — THERAPY VISIT (OUTPATIENT)
Dept: PHYSICAL THERAPY | Facility: CLINIC | Age: 53
End: 2018-03-02
Payer: COMMERCIAL

## 2018-03-02 DIAGNOSIS — Z98.890 S/P ARTHROSCOPY OF SHOULDER: ICD-10-CM

## 2018-03-02 DIAGNOSIS — M25.511 ACUTE PAIN OF RIGHT SHOULDER: ICD-10-CM

## 2018-03-02 PROCEDURE — 97112 NEUROMUSCULAR REEDUCATION: CPT | Mod: GP | Performed by: PHYSICAL THERAPIST

## 2018-03-02 PROCEDURE — 97110 THERAPEUTIC EXERCISES: CPT | Mod: GP | Performed by: PHYSICAL THERAPIST

## 2018-03-15 ENCOUNTER — THERAPY VISIT (OUTPATIENT)
Dept: PHYSICAL THERAPY | Facility: CLINIC | Age: 53
End: 2018-03-15
Payer: COMMERCIAL

## 2018-03-15 DIAGNOSIS — Z98.890 S/P ARTHROSCOPY OF SHOULDER: ICD-10-CM

## 2018-03-15 DIAGNOSIS — M25.511 ACUTE PAIN OF RIGHT SHOULDER: ICD-10-CM

## 2018-03-15 PROCEDURE — 97112 NEUROMUSCULAR REEDUCATION: CPT | Mod: GP | Performed by: PHYSICAL THERAPIST

## 2018-03-15 PROCEDURE — 97110 THERAPEUTIC EXERCISES: CPT | Mod: GP | Performed by: PHYSICAL THERAPIST

## 2018-03-22 DIAGNOSIS — I10 HYPERTENSION GOAL BP (BLOOD PRESSURE) < 130/80: ICD-10-CM

## 2018-03-22 RX ORDER — LISINOPRIL 40 MG/1
TABLET ORAL
Qty: 90 TABLET | Refills: 2 | Status: SHIPPED | OUTPATIENT
Start: 2018-03-22 | End: 2019-08-13

## 2018-03-22 NOTE — TELEPHONE ENCOUNTER
"Last Written Prescription Date:  5/9/17  Last Fill Quantity: 90,  # refills: 3   Last office visit: No previous visit found with prescribing provider:  11/14/17   Future Office Visit:    Requested Prescriptions   Pending Prescriptions Disp Refills     lisinopril (PRINIVIL/ZESTRIL) 40 MG tablet [Pharmacy Med Name: LISINOPRIL TAB 40MG] 90 tablet 3     Sig: TAKE 1 TABLET DAILY    ACE Inhibitors (Including Combos) Protocol Passed    3/22/2018  9:12 AM       Passed - Blood pressure under 140/90 in past 12 months    BP Readings from Last 3 Encounters:   01/15/18 110/66   11/14/17 115/76   05/09/17 105/69                Passed - Recent (12 mo) or future (30 days) visit within the authorizing provider's specialty    Patient had office visit in the last 12 months or has a visit in the next 30 days with authorizing provider or within the authorizing provider's specialty.  See \"Patient Info\" tab in inbasket, or \"Choose Columns\" in Meds & Orders section of the refill encounter.           Passed - Patient is age 18 or older       Passed - No active pregnancy on record       Passed - Normal serum creatinine on file in past 12 months    Recent Labs   Lab Test  01/15/18   1004   CR  0.83            Passed - Normal serum potassium on file in past 12 months    Recent Labs   Lab Test  01/15/18   1004   POTASSIUM  3.4            Passed - No positive pregnancy test in past 12 months        Prescription approved per [facility] Refill Protocol.  Mary Herr, RN, BSN    "

## 2018-04-04 DIAGNOSIS — I10 HYPERTENSION GOAL BP (BLOOD PRESSURE) < 130/80: ICD-10-CM

## 2018-04-04 RX ORDER — HYDROCHLOROTHIAZIDE 25 MG/1
25 TABLET ORAL EVERY MORNING
Qty: 90 TABLET | Refills: 2 | Status: SHIPPED | OUTPATIENT
Start: 2018-04-04 | End: 2018-12-29

## 2018-04-04 NOTE — TELEPHONE ENCOUNTER
"Last Written Prescription Date:  5/9/17  Last Fill Quantity: 90,  # refills: 3   Last office visit: No previous visit found with prescribing provider:  11/14/17   Future Office Visit:    Requested Prescriptions   Pending Prescriptions Disp Refills     hydrochlorothiazide (HYDRODIURIL) 25 MG tablet 90 tablet 2     Sig: Take 1 tablet (25 mg) by mouth every morning    Diuretics (Including Combos) Protocol Passed    4/4/2018 12:51 PM       Passed - Blood pressure under 140/90 in past 12 months    BP Readings from Last 3 Encounters:   01/15/18 110/66   11/14/17 115/76   05/09/17 105/69                Passed - Recent (12 mo) or future (30 days) visit within the authorizing provider's specialty    Patient had office visit in the last 12 months or has a visit in the next 30 days with authorizing provider or within the authorizing provider's specialty.  See \"Patient Info\" tab in inbasket, or \"Choose Columns\" in Meds & Orders section of the refill encounter.           Passed - Patient is age 18 or older       Passed - No active pregancy on record       Passed - Normal serum creatinine on file in past 12 months    Recent Labs   Lab Test  01/15/18   1004   CR  0.83             Passed - Normal serum potassium on file in past 12 months    Recent Labs   Lab Test  01/15/18   1004   POTASSIUM  3.4                   Passed - Normal serum sodium on file in past 12 months    Recent Labs   Lab Test  01/15/18   1004   NA  138             Passed - No positive pregnancy test in past 12 months        Prescription approved per [facility] Refill Protocol.  Mary Herr, RN, BSN    "

## 2018-04-05 ENCOUNTER — THERAPY VISIT (OUTPATIENT)
Dept: PHYSICAL THERAPY | Facility: CLINIC | Age: 53
End: 2018-04-05
Payer: COMMERCIAL

## 2018-04-05 DIAGNOSIS — M25.511 ACUTE PAIN OF RIGHT SHOULDER: ICD-10-CM

## 2018-04-05 DIAGNOSIS — Z98.890 S/P ARTHROSCOPY OF SHOULDER: ICD-10-CM

## 2018-04-05 PROCEDURE — 97110 THERAPEUTIC EXERCISES: CPT | Mod: GP | Performed by: PHYSICAL THERAPIST

## 2018-04-05 PROCEDURE — 97112 NEUROMUSCULAR REEDUCATION: CPT | Mod: GP | Performed by: PHYSICAL THERAPIST

## 2018-04-05 NOTE — MR AVS SNAPSHOT
After Visit Summary   4/5/2018    Paty Johnson    MRN: [id number]           Patient Information     Date Of Birth          1965        Visit Information        Provider Department      4/5/2018 10:00 AM Jessica Ledezma PT [facility]        Today's Diagnoses     Acute pain of right shoulder        S/P arthroscopy of shoulder           Follow-ups after your visit        Your next 10 appointments already scheduled     Apr 26, 2018 10:40 AM CDT   JEANNIE Extremity with Jessica Ledezma PT   [facility] ([facility] [place])    [address]  [place] [address] [phone]              Who to contact     If you have questions or need follow up information about today's clinic visit or your schedule please contact [facility] directly at [phone].  Normal or non-critical lab and imaging results will be communicated to you by MyChart, letter or phone within 4 business days after the clinic has received the results. If you do not hear from us within 7 days, please contact the clinic through [facility] or phone. If you have a critical or abnormal lab result, we will notify you by phone as soon as possible.  Submit refill requests through DFine or call your pharmacy and they will forward the refill request to us. Please allow 3 business days for your refill to be completed.          Additional Information About Your Visit        MyChart Information     DFine gives you secure access to your electronic health record. If you see a primary care provider, you can also send messages to your care team and make appointments. If you have questions, please call your primary care clinic.  If you do not have a primary care provider, please call [phone] and they will assist you.        Care EveryWhere ID     This is your Care EveryWhere ID. This could be used by other organizations to access your [facility]  medical records  [id number]         Blood Pressure from Last 3 Encounters:   01/15/18 110/66   11/14/17 115/76   05/09/17 105/69    Weight from Last 3 Encounters:   01/15/18 59.4 kg (131 lb)   11/14/17 60.1 kg (132 lb 9.6 oz)   05/09/17 58.1 kg (128 lb)              We Performed the Following     JEANNIE PROGRESS NOTES REPORT     NEUROMUSCULAR RE-EDUCATION     THERAPEUTIC EXERCISES        Primary Care Provider Office Phone # Fax #    Volodymyrserena Eyal Berg [phone]       [facility] [address]  [place] [address]        Equal Access to Services     YOEL ALSTON : Hadii olya brooke haditaliao Sodoroteo, waaxda luqadaha, qaybta kaalmada adethaiyada, abraham la . So [facility] [phone].    ATENCIÓN: Si habla español, tiene a sauceda disposición servicios gratuitos de asistencia lingüística. Llame al [phone].    We comply with applicable federal civil rights laws and [place] laws. We do not discriminate on the basis of race, color, national origin, age, disability, sex, sexual orientation, or gender identity.            Thank you!     Thank you for choosing [facility]  for your care. Our goal is always to provide you with excellent care. Hearing back from our patients is one way we can continue to improve our services. Please take a few minutes to complete the written survey that you may receive in the mail after your visit with us. Thank you!             Your Updated Medication List - Protect others around you: Learn how to safely use, store and throw away your medicines at www.disposemymeds.org.          This list is accurate as of 4/5/18 11:50 AM.  Always use your most recent med list.                   Brand Name Dispense Instructions for use Diagnosis    hydrochlorothiazide 25 MG tablet    HYDRODIURIL    90 tablet    Take 1 tablet (25 mg) by mouth every morning    Hypertension goal BP (blood pressure) < 130/80       lisinopril  40 MG tablet    PRINIVIL/ZESTRIL    90 tablet    TAKE 1 TABLET DAILY    Hypertension goal BP (blood pressure) < 130/80

## 2018-04-05 NOTE — LETTER
[facility] [place]  [address] [place] [address]  [phone]    2018    Re: Paty Johnson   :   1965  MRN:  [id number]   REFERRING PHYSICIAN:   Mike Rodrigez    [facility]    Date of Initial Evaluation:  2018  Visits:  Rxs Used: 6  Reason for Referral:     Acute pain of right shoulder  S/P arthroscopy of shoulder    PROGRESS  REPORT  Progress reporting period is from 18 to 18.       SUBJECTIVE  Subjective changes noted by patient: reports that shoulder is doing well, no pain most of the time, exercises are getting easier, a little sore w/ walking, using arm w/ ADL's, is able to carry a bag of groceries at her side, cannot lift a gallon of milk into fridge yet w./out discomfort    Current Pain level:  (5/10 worst).     Previous pain level was  6/10  .   Changes in function:  Yes (See Goal flowsheet attached for changes in current functional level)  Adverse reaction to treatment or activity: None    OBJECTIVE  Changes noted in objective findings:  Yes,   Objective: AROM is WNL, flex 4+/5, scapt 4/5, ER 4+/5, IR 5-/5, good scap stabilizer activation w/ minimal to no winging noted     ASSESSMENT/PLAN  Updated problem list and treatment plan: Diagnosis 1:  S/p R GH stabilization  Pain -  hot/cold therapy, manual therapy, education, directional preference exercise and home program  Decreased strength - therapeutic exercise, therapeutic activities and home program  Impaired muscle performance - neuro re-education and home program  Decreased function - therapeutic activities and home program  STG/LTGs have been met or progress has been made towards goals:  Yes (See Goal flow sheet completed today.)  Assessment of Progress: The patient's condition is improving.  Self Management Plans:  Patient has been instructed in a home treatment program.  Patient  has been instructed in self management of symptoms.  I  have re-evaluated this patient and find that the nature, scope, duration and intensity of the therapy is appropriate for the medical condition of the patient.  Paty continues to require the following intervention to meet STG and LTG's:  PT        Re: Paty Johnson   :   1965    Recommendations:  This patient would benefit from continued therapy.     Frequency:  2 X a month, once daily  Duration:  for 2 months    Thank you for your referral.    INQUIRIES  Therapist: Jessica Ledezma PT  [facility]  [address] [place] [address]  Phone: [phone]  Fax: [phone]

## 2018-04-05 NOTE — PROGRESS NOTES
Subjective:  HPI                    Objective:  System    Physical Exam    General     ROS    Assessment/Plan:    PROGRESS  REPORT    Progress reporting period is from 2/9/18 to 4/5/18.       SUBJECTIVE  Subjective changes noted by patient: reports that shoulder is doing well, no pain most of the time, exercises are getting easier, a little sore w/ walking, using arm w/ ADL's, is able to carry a bag of groceries at her side, cannot lift a gallon of milk into fridge yet w./out discomfort    Current Pain level:  (5/10 worst).     Previous pain level was  6/10  .   Changes in function:  Yes (See Goal flowsheet attached for changes in current functional level)  Adverse reaction to treatment or activity: None    OBJECTIVE  Changes noted in objective findings:  Yes,   Objective: AROM is WNL, flex 4+/5, scapt 4/5, ER 4+/5, IR 5-/5, good scap stabilizer activation w/ minimal to no winging noted     ASSESSMENT/PLAN  Updated problem list and treatment plan: Diagnosis 1:  S/p R GH stabilization  Pain -  hot/cold therapy, manual therapy, education, directional preference exercise and home program  Decreased strength - therapeutic exercise, therapeutic activities and home program  Impaired muscle performance - neuro re-education and home program  Decreased function - therapeutic activities and home program  STG/LTGs have been met or progress has been made towards goals:  Yes (See Goal flow sheet completed today.)  Assessment of Progress: The patient's condition is improving.  Self Management Plans:  Patient has been instructed in a home treatment program.  Patient  has been instructed in self management of symptoms.  I have re-evaluated this patient and find that the nature, scope, duration and intensity of the therapy is appropriate for the medical condition of the patient.  Paty continues to require the following intervention to meet STG and LTG's:  PT    Recommendations:  This patient would benefit from continued therapy.      Frequency:  2 X a month, once daily  Duration:  for 2 months        Please refer to the daily flowsheet for treatment today, total treatment time and time spent performing 1:1 timed codes.

## 2018-04-11 ENCOUNTER — TRANSFERRED RECORDS (OUTPATIENT)
Dept: HEALTH INFORMATION MANAGEMENT | Facility: CLINIC | Age: 53
End: 2018-04-11

## 2018-04-20 DIAGNOSIS — R94.6 BORDERLINE ABNORMAL TFTS: ICD-10-CM

## 2018-04-20 DIAGNOSIS — Z00.00 ROUTINE GENERAL MEDICAL EXAMINATION AT A HEALTH CARE FACILITY: ICD-10-CM

## 2018-04-20 DIAGNOSIS — Z13.6 CARDIOVASCULAR SCREENING; LDL GOAL LESS THAN 130: ICD-10-CM

## 2018-04-20 LAB
ALBUMIN SERPL-MCNC: 4.3 G/DL (ref 3.4–5)
ALP SERPL-CCNC: 28 U/L (ref 40–150)
ALT SERPL W P-5'-P-CCNC: 13 U/L (ref 0–50)
AST SERPL W P-5'-P-CCNC: 10 U/L (ref 0–45)
BASOPHILS # BLD AUTO: 0 10E9/L (ref 0–0.2)
BASOPHILS NFR BLD AUTO: 0.2 %
BILIRUB DIRECT SERPL-MCNC: 0.2 MG/DL (ref 0–0.2)
BILIRUB SERPL-MCNC: 0.8 MG/DL (ref 0.2–1.3)
CHOLEST SERPL-MCNC: 202 MG/DL
DIFFERENTIAL METHOD BLD: NORMAL
EOSINOPHIL # BLD AUTO: 0.1 10E9/L (ref 0–0.7)
EOSINOPHIL NFR BLD AUTO: 2.1 %
ERYTHROCYTE [DISTWIDTH] IN BLOOD BY AUTOMATED COUNT: 12.6 % (ref 10–15)
HBA1C MFR BLD: 4.8 % (ref 0–5.6)
HCT VFR BLD AUTO: 39.9 % (ref 35–47)
HDLC SERPL-MCNC: 52 MG/DL
HGB BLD-MCNC: 14.2 G/DL (ref 11.7–15.7)
LDLC SERPL CALC-MCNC: 141 MG/DL
LYMPHOCYTES # BLD AUTO: 1.5 10E9/L (ref 0.8–5.3)
LYMPHOCYTES NFR BLD AUTO: 30.3 %
MCH RBC QN AUTO: 31.3 PG (ref 26.5–33)
MCHC RBC AUTO-ENTMCNC: 35.6 G/DL (ref 31.5–36.5)
MCV RBC AUTO: 88 FL (ref 78–100)
MONOCYTES # BLD AUTO: 0.6 10E9/L (ref 0–1.3)
MONOCYTES NFR BLD AUTO: 12.4 %
NEUTROPHILS # BLD AUTO: 2.7 10E9/L (ref 1.6–8.3)
NEUTROPHILS NFR BLD AUTO: 55 %
NONHDLC SERPL-MCNC: 150 MG/DL
PLATELET # BLD AUTO: 312 10E9/L (ref 150–450)
PROT SERPL-MCNC: 7.9 G/DL (ref 6.8–8.8)
RBC # BLD AUTO: 4.54 10E12/L (ref 3.8–5.2)
T3FREE SERPL-MCNC: 2.8 PG/ML (ref 2.3–4.2)
T4 FREE SERPL-MCNC: 0.81 NG/DL (ref 0.76–1.46)
TRIGL SERPL-MCNC: 46 MG/DL
TSH SERPL DL<=0.005 MIU/L-ACNC: 6.54 MU/L (ref 0.4–4)
WBC # BLD AUTO: 4.8 10E9/L (ref 4–11)

## 2018-04-20 PROCEDURE — 86800 THYROGLOBULIN ANTIBODY: CPT | Performed by: INTERNAL MEDICINE

## 2018-04-20 PROCEDURE — 99000 SPECIMEN HANDLING OFFICE-LAB: CPT | Performed by: INTERNAL MEDICINE

## 2018-04-20 PROCEDURE — 84481 FREE ASSAY (FT-3): CPT | Performed by: INTERNAL MEDICINE

## 2018-04-20 PROCEDURE — 80061 LIPID PANEL: CPT | Performed by: INTERNAL MEDICINE

## 2018-04-20 PROCEDURE — 80076 HEPATIC FUNCTION PANEL: CPT | Performed by: INTERNAL MEDICINE

## 2018-04-20 PROCEDURE — 86376 MICROSOMAL ANTIBODY EACH: CPT | Performed by: INTERNAL MEDICINE

## 2018-04-20 PROCEDURE — 85025 COMPLETE CBC W/AUTO DIFF WBC: CPT | Performed by: INTERNAL MEDICINE

## 2018-04-20 PROCEDURE — 84439 ASSAY OF FREE THYROXINE: CPT | Performed by: INTERNAL MEDICINE

## 2018-04-20 PROCEDURE — 36415 COLL VENOUS BLD VENIPUNCTURE: CPT | Performed by: INTERNAL MEDICINE

## 2018-04-20 PROCEDURE — 83036 HEMOGLOBIN GLYCOSYLATED A1C: CPT | Performed by: INTERNAL MEDICINE

## 2018-04-20 PROCEDURE — 84443 ASSAY THYROID STIM HORMONE: CPT | Performed by: INTERNAL MEDICINE

## 2018-04-20 PROCEDURE — 84445 ASSAY OF TSI GLOBULIN: CPT | Mod: 90 | Performed by: INTERNAL MEDICINE

## 2018-04-23 LAB — THYROGLOB AB SERPL IA-ACNC: <20 IU/ML (ref 0–40)

## 2018-04-24 LAB
THYROPEROXIDASE AB SERPL-ACNC: 12 IU/ML
TSI SER-ACNC: <1 TSI INDEX

## 2018-04-25 ENCOUNTER — THERAPY VISIT (OUTPATIENT)
Dept: PHYSICAL THERAPY | Facility: CLINIC | Age: 53
End: 2018-04-25
Payer: COMMERCIAL

## 2018-04-25 DIAGNOSIS — M25.511 ACUTE PAIN OF RIGHT SHOULDER: ICD-10-CM

## 2018-04-25 DIAGNOSIS — Z98.890 S/P ARTHROSCOPY OF SHOULDER: ICD-10-CM

## 2018-04-25 PROCEDURE — 97110 THERAPEUTIC EXERCISES: CPT | Mod: GP | Performed by: PHYSICAL THERAPIST

## 2018-04-25 PROCEDURE — 97112 NEUROMUSCULAR REEDUCATION: CPT | Mod: GP | Performed by: PHYSICAL THERAPIST

## 2018-04-30 ENCOUNTER — OFFICE VISIT (OUTPATIENT)
Dept: PODIATRY | Facility: CLINIC | Age: 53
End: 2018-04-30
Payer: COMMERCIAL

## 2018-04-30 VITALS
DIASTOLIC BLOOD PRESSURE: 64 MMHG | HEIGHT: 65 IN | WEIGHT: 130 LBS | BODY MASS INDEX: 21.66 KG/M2 | SYSTOLIC BLOOD PRESSURE: 110 MMHG | HEART RATE: 78 BPM

## 2018-04-30 DIAGNOSIS — M79.671 RIGHT FOOT PAIN: Primary | ICD-10-CM

## 2018-04-30 DIAGNOSIS — L60.0 INGROWING NAIL, RIGHT GREAT TOE: ICD-10-CM

## 2018-04-30 PROCEDURE — 11730 AVULSION NAIL PLATE SIMPLE 1: CPT | Mod: T5 | Performed by: PODIATRIST

## 2018-04-30 ASSESSMENT — PAIN SCALES - GENERAL: PAINLEVEL: MODERATE PAIN (4)

## 2018-04-30 NOTE — MR AVS SNAPSHOT
After Visit Summary   4/30/2018    Paty Johnson    MRN: [id number]           Patient Information     Date Of Birth          1965        Visit Information        Provider Department      4/30/2018 9:30 AM Sanjana Madison DPM, Podiatry/Foot and Ankle Surgery [facility]        Today's Diagnoses     Right foot pain    -  1    Ingrowing nail, right great toe          Care Instructions    Thank you for choosing [facility]!    DR. MADISON'S CLINIC SCHEDULE  MONDAY AM - [place] TUESDAY - [place]   [address] [facility] [address] [place] [address] [place] [address]   [phone] / [phone] / [phone]       [facility]   [address]   [place] [address] [place] [address]   [phone] / [phone]       FRIDAY PM - [place] SCHEDULE SURGERY: [phone]   [address] BILLING QUESTIONS: [phone]   [place] [address] AFTER HOURS: [phone] / [phone] APPOINTMENTS: [phone]     INGROWN TOENAILS  When a toenail is ingrown, it is curved and grows into the skin, usually at the nail borders (the sides of the nail). This  digging in  of the nail irritates the skin, often creating pain, redness, swelling, and warmth in the toe.  If an ingrown nail causes a break in the skin, bacteria may enter and cause an infection in the area, which is often marked by drainage and a foul odor. However, even if the toe isn t painful, red, swollen, or warm, a nail that curves downward into the skin can progress to an infection.  CAUSES:  Heredity: In many people, the tendency for ingrown toenails is inherited.   Trauma: Sometimes an ingrown toenail is the result of trauma, such as stubbing your toe, having an object fall on your toe, or engaging in activities that involve repeated pressure on the toes, such as  kicking or running.   Improper Trimming:  The most common cause of ingrown toenails is cutting your nails too short. This encourages the skin next to the nail to fold over the nail.   Improperly Sized Footwear: Ingrown toenails can result from wearing socks and shoes that are tight or short.   Nail Conditions: Ingrown toenails can be caused by nail problems, such as fungal infections or losing a nail due to trauma.   TREATMENT: Sometimes initial treatment for ingrown toenails can be safely performed at home. However, home treatment is strongly discouraged if an infection is suspected, or for those who have medical conditions that put feet at high risk, such as diabetes, nerve damage in the foot, or poor circulation.  Home care: If you don t have an infection or any of the above medical conditions, you can soak your foot in room-temperature water (adding Epsom s salt may be recommended by your doctor), and gently massage the side of the nail fold to help reduce the inflammation.  Avoid attempting  bathroom surgery.  Repeated cutting of the nail can cause the condition to worsen over time. If your symptoms fail to improve, it s time to see a foot and ankle surgeon.  Physician care: After examining the toe, the foot and ankle surgeon will select the treatment best suited for you. If an infection is present, an oral antibiotic may be prescribed.  Sometimes a minor surgical procedure, often performed in the office, will ease the pain and remove the offending nail. After applying a local anesthetic, the doctor removes part of the nail s side border. Some nails may become ingrown again, requiring removal of the nail root.  Following the nail procedure, a light bandage will be applied. Most people experience very little pain after surgery and may resume normal activity the next day. If your surgeon has prescribed an oral antibiotic, be sure to take all the medication, even if your symptoms have  improved.  PREVENTION:  Proper Trimming: Cut toenails in a fairly straight line, and don t cut them too short. You should be able to get your fingernail under the sides and end of the nail.   Well-fitting Footwear: Don t wear shoes that are short or tight in the toe area. Avoid shoes that are loose, because they too cause pressure on the toes, especially when running or walking briskly.     INGROWN TOENAIL REMOVAL AFTERCARE     Go directly home and elevate the affected foot on one or two pillows for the remainder of the day/evening if possible. Your toe may stay numb anywhere from 2-8 hours.     Take Tylenol, ibuprofen or another anti-inflammatory as needed for pain.     Take antibiotic if that has been prescribed. Finish the entire prescribed antibiotic even if your symptoms have improved.     The evening of the procedure, soak/wash the affected area in warm water (you may add Epsom salt) for 5 to 10 minutes. Do this twice a day for 2-4 weeks (6-8 weeks if you had phenol) (you may count showering/bathing as one soak).  After soaks, pat the area dry and then allow to airdry for a few minutes. Apply antibiotic ointment to the area and cover with 2 X 2 gauze and paper tape or band-aid.    You may pursue everyday activities as tolerated with either an open toe shoe or cut-out shoe as needed or you may wear regular shoes if no pain is noted.    Watch for any signs and symptoms of infection such as: redness, red streaks going up the foot/leg, swelling, pus or foul odor. Those that have had the phenol procedure, the toe will drain longer and will look like it is infected because it is a chemical burn.     Please call with questions.      Body Mass Index (BMI)  Many things can cause foot and ankle problems. Foot structure, activity level, foot mechanics and injuries are common causes of pain.  One very important issue that often goes unmentioned, is body weight. Extra weight can cause increased stress on muscles,  ligaments, bones and tendons.  Sometimes just a few extra pounds is all it takes to put one over her/his threshold. Without reducing that stress, it can be difficult to alleviate pain. Some people are uncomfortable addressing this issue, but we feel it is important for you to think about it. As Foot &  Ankle specialists, our job is addressing the lower extremity problem and possible causes. Regarding extra body weight, we encourage patients to discuss diet and weight management plans with their primary care doctors. It is this team approach that gives you the best opportunity for pain relief and getting you back on your feet.                  Follow-ups after your visit        Your next 10 appointments already scheduled     May 22, 2018 10:00 AM CDT   Return Visit with Casa Berg MD   [facility] ([facility])    [address]  [place] [address]   [phone]            May 24, 2018  9:40 AM CDT   JEANNIE Extremity with Roman Infante PT   [facility] [place] [facility] (JEANNIE [place])    [address]  [place] [address] [phone]              Who to contact     If you have questions or need follow up information about today's clinic visit or your schedule please contact [facility] directly at [phone].  Normal or non-critical lab and imaging results will be communicated to you by MyChart, letter or phone within 4 business days after the clinic has received the results. If you do not hear from us within 7 days, please contact the clinic through MyChart or phone. If you have a critical or abnormal lab result, we will notify you by phone as soon as possible.  Submit refill requests through [employer] or call your pharmacy and they will forward the refill request to us. Please allow 3 business days for your refill to be completed.          Additional Information About Your  "Visit        MyChart Information     [employer] gives you secure access to your electronic health record. If you see a primary care provider, you can also send messages to your care team and make appointments. If you have questions, please call your primary care clinic.  If you do not have a primary care provider, please call [phone] and they will assist you.        Care EveryWhere ID     This is your Care EveryWhere ID. This could be used by other organizations to access your [facility] medical records  [id number]        Your Vitals Were     Pulse Height BMI (Body Mass Index)             78 5' 4.5\" (1.638 m) 21.97 kg/m2          Blood Pressure from Last 3 Encounters:   04/30/18 110/64   01/15/18 110/66   11/14/17 115/76    Weight from Last 3 Encounters:   04/30/18 130 lb (59 kg)   01/15/18 131 lb (59.4 kg)   11/14/17 132 lb 9.6 oz (60.1 kg)              We Performed the Following     DEBRIDE SKIN/SUBQ TISSUE        Primary Care Provider Office Phone # Fax #    Casa Berg [phone]       [facility] [address]  [place] [address]        Equal Access to Services     CECIL ALSTON : Hadii aad ku hadasho Soomaali, waaxda luqadaha, qaybta kaalmada adeegyada, waxay idiin hayamandan clifford la . So [facility] [phone].    ATENCIÓN: Si habla español, tiene a sauceda disposición servicios gratuitos de asistencia lingüística. Llame al [phone].    We comply with applicable federal civil rights laws and [place] laws. We do not discriminate on the basis of race, color, national origin, age, disability, sex, sexual orientation, or gender identity.            Thank you!     Thank you for choosing [facility]  for your care. Our goal is always to provide you with excellent care. Hearing back from our patients is one way we can continue to improve our services. Please take a few minutes to complete the written survey that you may receive in the mail after your " visit with us. Thank you!             Your Updated Medication List - Protect others around you: Learn how to safely use, store and throw away your medicines at www.disposemymeds.org.          This list is accurate as of 4/30/18  9:50 AM.  Always use your most recent med list.                   Brand Name Dispense Instructions for use Diagnosis    hydrochlorothiazide 25 MG tablet    HYDRODIURIL    90 tablet    Take 1 tablet (25 mg) by mouth every morning    Hypertension goal BP (blood pressure) < 130/80       lisinopril 40 MG tablet    PRINIVIL/ZESTRIL    90 tablet    TAKE 1 TABLET DAILY    Hypertension goal BP (blood pressure) < 130/80

## 2018-04-30 NOTE — LETTER
"    4/30/2018         RE: Paty Johnson  [address]  [facility] [address]        Dear Colleague,    Thank you for referring your patient, Paty Johnson, to the [facility]. Please see a copy of my visit note below.    Podiatry / Foot and Ankle Surgery Progress Note    April 30, 2018    Subject: Patient was seen for pain to right great toe. Notes that it has been red and sore on the side. More painful in shoes. 4/10 at the most. Wondering if she needs nail removed again.     Objective:  Vitals: /64  Pulse 78  Ht 5' 4.5\" (1.638 m)  Wt 130 lb (59 kg)  BMI 21.97 kg/m2  BMI= Body mass index is 21.97 kg/(m^2).    Dermatologic: medial border of right great toenail is incurvated. Localized redness and pain on palpation.       Vascular: DP & PT pulses are intact & regular bilaterally.  No significant edema or varicosities noted.  CFT and skin temperature is normal to both lower extremities.      Neurologic: Lower extremity sensation is intact to light touch.  No evidence of weakness or contracture in the lower extremities.  No evidence of neuropathy.      Musculoskeletal: Patient is ambulatory without assistive device or brace.  No gross ankle deformity noted.  No foot or ankle joint effusion is noted.     Radiographs:  Independently read. Do not see fracture of distal phalanx.       ASSESSMENT: right great toenail and nailbed injury.      PLAN:  Reviewed patient's chart in [facility]. Reviewed and discussed xrays. Recommend removal of the partially torn nail. She declined post op shoes. She will soak the foot twice a day for 2 weeks and apply antibiotic cream and bandaids. Was given an order for Keflex and lidocaine gel. She was recommended to wear stiff soled shoes for next 2 weeks to protect toe. Follow up as needed.     Procedure: After verbal consent, the right big toe was anesthetized with 5cc's of 1% lidocaine plain. A tourniquet was applied to the toe. The medial border was " then raised from the nail bed and then cut the length of the nail.  The offending nail border was then removed. Bacitracin was applied to the nail bed.  The tourniquet was removed.  Bandage was applied to the toe.  The patient tolerated the procedure and anesthesia well.        Sanjana Madison DPM, Podiatry/Foot and Ankle Surgery      Again, thank you for allowing me to participate in the care of your patient.        Sincerely,        Sanjana Madison DPM, Podiatry/Foot and Ankle Surgery

## 2018-04-30 NOTE — PROGRESS NOTES
"Podiatry / Foot and Ankle Surgery Progress Note    April 30, 2018    Subject: Patient was seen for pain to right great toe. Notes that it has been red and sore on the side. More painful in shoes. 4/10 at the most. Wondering if she needs nail removed again.     Objective:  Vitals: /64  Pulse 78  Ht 5' 4.5\" (1.638 m)  Wt 130 lb (59 kg)  BMI 21.97 kg/m2  BMI= Body mass index is 21.97 kg/(m^2).    Dermatologic: medial border of right great toenail is incurvated. Localized redness and pain on palpation.       Vascular: DP & PT pulses are intact & regular bilaterally.  No significant edema or varicosities noted.  CFT and skin temperature is normal to both lower extremities.      Neurologic: Lower extremity sensation is intact to light touch.  No evidence of weakness or contracture in the lower extremities.  No evidence of neuropathy.      Musculoskeletal: Patient is ambulatory without assistive device or brace.  No gross ankle deformity noted.  No foot or ankle joint effusion is noted.     Radiographs:  Independently read. Do not see fracture of distal phalanx.       ASSESSMENT: right great toenail and nailbed injury.      PLAN:  Reviewed patient's chart in [facility]. Reviewed and discussed xrays. Recommend removal of the partially torn nail. She declined post op shoes. She will soak the foot twice a day for 2 weeks and apply antibiotic cream and bandaids. Was given an order for Keflex and lidocaine gel. She was recommended to wear stiff soled shoes for next 2 weeks to protect toe. Follow up as needed.     Procedure: After verbal consent, the right big toe was anesthetized with 5cc's of 1% lidocaine plain. A tourniquet was applied to the toe. The medial border was then raised from the nail bed and then cut the length of the nail.  The offending nail border was then removed. Bacitracin was applied to the nail bed.  The tourniquet was removed.  Bandage was applied to the toe.  The patient tolerated the procedure and " anesthesia well.        Sanjana Madison DPM, Podiatry/Foot and Ankle Surgery

## 2018-04-30 NOTE — PATIENT INSTRUCTIONS
Thank you for choosing [facility]!    DR. DAMON'S CLINIC SCHEDULE  MONDAY AM - [place] TUESDAY - [place]   [address] [address] [address] [address] [address] [place] [address]   [phone] / [phone] / [phone]       WEDNESDAY - [place] FRIDAY AM - W[facility]   [address] [address]   [place] [address] [place] [address]   [phone] / [phone]       FRIDAY PM - [place] SCHEDULE SURGERY: [phone]   [address] BILLING QUESTIONS: [phone]   [place] [address] AFTER HOURS: [phone]   [phone] / [phone] APPOINTMENTS: [phone]     INGROWN TOENAILS  When a toenail is ingrown, it is curved and grows into the skin, usually at the nail borders (the sides of the nail). This  digging in  of the nail irritates the skin, often creating pain, redness, swelling, and warmth in the toe.  If an ingrown nail causes a break in the skin, bacteria may enter and cause an infection in the area, which is often marked by drainage and a foul odor. However, even if the toe isn t painful, red, swollen, or warm, a nail that curves downward into the skin can progress to an infection.  CAUSES:  Heredity: In many people, the tendency for ingrown toenails is inherited.   Trauma: Sometimes an ingrown toenail is the result of trauma, such as stubbing your toe, having an object fall on your toe, or engaging in activities that involve repeated pressure on the toes, such as kicking or running.   Improper Trimming:  The most common cause of ingrown toenails is cutting your nails too short. This encourages the skin next to the nail to fold over the nail.   Improperly Sized Footwear: Ingrown toenails can result from wearing socks and shoes that are tight or short.   Nail Conditions: Ingrown toenails can be caused by nail problems, such as fungal infections or losing a nail due to trauma.    TREATMENT: Sometimes initial treatment for ingrown toenails can be safely performed at home. However, home treatment is strongly discouraged if an infection is suspected, or for those who have medical conditions that put feet at high risk, such as diabetes, nerve damage in the foot, or poor circulation.  Home care: If you don t have an infection or any of the above medical conditions, you can soak your foot in room-temperature water (adding Epsom s salt may be recommended by your doctor), and gently massage the side of the nail fold to help reduce the inflammation.  Avoid attempting  bathroom surgery.  Repeated cutting of the nail can cause the condition to worsen over time. If your symptoms fail to improve, it s time to see a foot and ankle surgeon.  Physician care: After examining the toe, the foot and ankle surgeon will select the treatment best suited for you. If an infection is present, an oral antibiotic may be prescribed.  Sometimes a minor surgical procedure, often performed in the office, will ease the pain and remove the offending nail. After applying a local anesthetic, the doctor removes part of the nail s side border. Some nails may become ingrown again, requiring removal of the nail root.  Following the nail procedure, a light bandage will be applied. Most people experience very little pain after surgery and may resume normal activity the next day. If your surgeon has prescribed an oral antibiotic, be sure to take all the medication, even if your symptoms have improved.  PREVENTION:  Proper Trimming: Cut toenails in a fairly straight line, and don t cut them too short. You should be able to get your fingernail under the sides and end of the nail.   Well-fitting Footwear: Don t wear shoes that are short or tight in the toe area. Avoid shoes that are loose, because they too cause pressure on the toes, especially when running or walking briskly.     INGROWN TOENAIL REMOVAL AFTERCARE     Go directly home  and elevate the affected foot on one or two pillows for the remainder of the day/evening if possible. Your toe may stay numb anywhere from 2-8 hours.     Take Tylenol, ibuprofen or another anti-inflammatory as needed for pain.     Take antibiotic if that has been prescribed. Finish the entire prescribed antibiotic even if your symptoms have improved.     The evening of the procedure, soak/wash the affected area in warm water (you may add Epsom salt) for 5 to 10 minutes. Do this twice a day for 2-4 weeks (6-8 weeks if you had phenol) (you may count showering/bathing as one soak).  After soaks, pat the area dry and then allow to airdry for a few minutes. Apply antibiotic ointment to the area and cover with 2 X 2 gauze and paper tape or band-aid.    You may pursue everyday activities as tolerated with either an open toe shoe or cut-out shoe as needed or you may wear regular shoes if no pain is noted.    Watch for any signs and symptoms of infection such as: redness, red streaks going up the foot/leg, swelling, pus or foul odor. Those that have had the phenol procedure, the toe will drain longer and will look like it is infected because it is a chemical burn.     Please call with questions.      Body Mass Index (BMI)  Many things can cause foot and ankle problems. Foot structure, activity level, foot mechanics and injuries are common causes of pain.  One very important issue that often goes unmentioned, is body weight. Extra weight can cause increased stress on muscles, ligaments, bones and tendons.  Sometimes just a few extra pounds is all it takes to put one over her/his threshold. Without reducing that stress, it can be difficult to alleviate pain. Some people are uncomfortable addressing this issue, but we feel it is important for you to think about it. As Foot &  Ankle specialists, our job is addressing the lower extremity problem and possible causes. Regarding extra body weight, we encourage patients to discuss  diet and weight management plans with their primary care doctors. It is this team approach that gives you the best opportunity for pain relief and getting you back on your feet.

## 2018-04-30 NOTE — NURSING NOTE
"Chief Complaint   Patient presents with     Toenail     right hallux / swelling / removed once before / painful with shoes / some drainage x 1wk       Initial /64  Pulse 78  Ht 5' 4.5\" (1.638 m)  Wt 130 lb (59 kg)  BMI 21.97 kg/m2 Estimated body mass index is 21.97 kg/(m^2) as calculated from the following:    Height as of this encounter: 5' 4.5\" (1.638 m).    Weight as of this encounter: 130 lb (59 kg).  Medication Reconciliation: complete    "

## 2018-05-03 ENCOUNTER — TELEPHONE (OUTPATIENT)
Dept: PODIATRY | Facility: CLINIC | Age: 53
End: 2018-05-03

## 2018-05-03 ENCOUNTER — NURSE TRIAGE (OUTPATIENT)
Dept: NURSING | Facility: CLINIC | Age: 53
End: 2018-05-03

## 2018-05-03 NOTE — TELEPHONE ENCOUNTER
S-(situation): Yin with symptoms of infection to right great toe.    B-(background): Seen in clinic 4/30/18 with Dr. Madison.  Had partial toenail removal for ingrown toenail.     A-(assessment): Starting 5/2/18 started having increased pain, increased redness and increased swelling since procedure.  Has yellow / pussy drainage coming out.  Redness 1/4 inch around area, able to bear full weight, no fever.  Has been doing nightly epsom salt soaks, applying Bacitracin and keeping area covered as instructed.  Did review s/s with which she should not wait, and to be seen tonight for.     R-(recommendations): Would like to ask if Dr. Madison could prescribe abx, or if she has to be seen ?  Would use the [facility] in [place] [address].  Could you please call Yin and advise?  Aware it won't be today.      Thanks  Lavinia Chaney RN  FNA

## 2018-05-03 NOTE — TELEPHONE ENCOUNTER
[facility]  [facility]  [address]  [place] [address]  [phone]      Shaun Lancaster :1984 MRN:[id number]    2017 Time Session Began: 11:00  Time Session Ended: 11:45    Session Type:45 Minute Therapy (76500)    Others Present: NA    Intervention: Behavioral, Cognitive, Supportive    Suicide/Homicide/Violence Ideation: No    If Yes, explain: NA    Current Outpatient Prescriptions   Medication Sig   • magnesium oxide (MAG-OX) 400 MG tablet Take 1 tablet by mouth 2 times daily.   • venlafaxine XR (EFFEXOR XR) 75 MG 24 hr capsule Take 1 qam for 7 days then 2 qam   • ARIPiprazole (ABILIFY) 5 MG tablet Take 1 tablet by mouth daily.   • dexamethasone (DECADRON) 4 MG tablet Take 2 tablets by mouth 2 times daily (with meals). On days 6 to 8   • gabapentin (NEURONTIN) 100 MG capsule Take 100 mg by mouth 3 times daily.   • prochlorperazine (COMPAZINE) 10 MG tablet Take 1 tablet by mouth every 6 hours as needed for Nausea or Vomiting.   • naproxen (NAPROSYN) 500 MG tablet Take 1 tablet by mouth 2 times daily (with meals).   • cyclobenzaprine (FLEXERIL) 10 MG tablet Take 1 tablet by mouth 3 times daily.   • albuterol 108 (90 Base) MCG/ACT inhaler Inhale 2 puffs into the lungs every 4 hours as needed for Shortness of Breath or Wheezing.     No current facility-administered medications for this visit.        Change in Medication(s) Reported: No  If Yes, explain: NA    Patient/Family Education Provided: Yes  Patient/Family Displays Understanding: Yes    If No, explain: NA    Chief complaint in patient's own words: Patient indicated that he was diagnosed with cancer about 1 month ago.     Progress Note containing chief complaint and symptoms/problems related to the complaint:    (Data/Action/Response/Plan)    DARP: 45 minute follow up appointment with 33 year old male to discuss and finalize goals for the initial treatment plan to address his symptoms of  S/p ingrown toenail removal on 4/30/18, see telephone encounter.      Reason for Disposition    Yellow pus seen in skin around toenail (cuticle area), or pus seen under toenail    Additional Information    Negative: Entire toe is swollen    Negative: Entire toe is red    Negative: [1] Looks infected (e.g., spreading redness, red streak, pus) AND [2] fever    Negative: [1] Red streaking AND [2] longer than 1 inch (2.5 cm)    Negative: [1] Skin around the nail has become red AND [2] larger than 2 inches (5 cm)    Negative: Patient sounds very sick or weak to the triager    Negative: Doesn't match the SYMPTOMS for ingrown toenail    Protocols used: TOENAIL - INGROWN-ADULT-AH     depression and anxiety.  Two treatment goals were added to the patient's treatment plan which included learn relaxation skills and increase pleasurable activities.  Patient understood all of the information reviewed.  The patient participated in the goal selection process and was in agreement with the treatment goals that were added to his treatment plan.  Deep breathing was discussed and reviewed in session. Patient was encouraged to practice the skill at home to help with relaxation and sleep.  He was also encouraged to speak to his psychiatrist about his sleep issues. Will meet with patient on 8-2-17.    Need for Community Resources Assessed: Yes    Resources Needed: No    If Yes, what resources: NA    Primary Diagnosis: Major Depression Recurrent  : 4 Severe spec as w/ psych behav    Treatment Plan: See Treatment Plan    Discharge Plan: N/A    Next Appointment: 8-2-17      Anne Krishnamurthy PSYD

## 2018-05-04 ENCOUNTER — OFFICE VISIT (OUTPATIENT)
Dept: URGENT CARE | Facility: URGENT CARE | Age: 53
End: 2018-05-04
Payer: COMMERCIAL

## 2018-05-04 VITALS
OXYGEN SATURATION: 97 % | DIASTOLIC BLOOD PRESSURE: 66 MMHG | HEART RATE: 65 BPM | SYSTOLIC BLOOD PRESSURE: 110 MMHG | RESPIRATION RATE: 16 BRPM | WEIGHT: 130 LBS | TEMPERATURE: 98.7 F | HEIGHT: 65 IN | BODY MASS INDEX: 21.66 KG/M2

## 2018-05-04 DIAGNOSIS — T81.40XA POSTOPERATIVE INFECTION, INITIAL ENCOUNTER: Primary | ICD-10-CM

## 2018-05-04 PROCEDURE — 99213 OFFICE O/P EST LOW 20 MIN: CPT | Performed by: FAMILY MEDICINE

## 2018-05-04 RX ORDER — CEPHALEXIN 500 MG/1
500 CAPSULE ORAL 4 TIMES DAILY
Qty: 40 CAPSULE | Refills: 0 | Status: SHIPPED | OUTPATIENT
Start: 2018-05-04 | End: 2018-06-08

## 2018-05-04 NOTE — MR AVS SNAPSHOT
After Visit Summary   5/4/2018    Paty Johnson    MRN: [id number]           Patient Information     Date Of Birth          1965        Visit Information        Provider Department      5/4/2018 5:50 PM Krystle Diego MD [facility]        Today's Diagnoses     Postoperative infection, initial encounter    -  1       Follow-ups after your visit        Your next 10 appointments already scheduled     May 22, 2018 10:00 AM CDT   Return Visit with Casa Berg MD   [facility] ([facility])    [address]  [place] [address]   [phone]            May 24, 2018  9:40 AM CDT   JEANNIE Extremity with Roman Infante PT   [facility] [place] [facility] ([facility] [place])    [address]  [place] [address] [phone]              Who to contact     If you have questions or need follow up information about today's clinic visit or your schedule please contact [facility] directly at [phone].  Normal or non-critical lab and imaging results will be communicated to you by MyChart, letter or phone within 4 business days after the clinic has received the results. If you do not hear from us within 7 days, please contact the clinic through [email or web address] or phone. If you have a critical or abnormal lab result, we will notify you by phone as soon as possible.  Submit refill requests through [email or web address] or call your pharmacy and they will forward the refill request to us. Please allow 3 business days for your refill to be completed.          Additional Information About Your Visit        MyChart Information     [email or web address] gives you secure access to your electronic health record. If you see a primary care provider, you can also send messages to your care team and make appointments. If you have questions, please call your primary care clinic.  If you do  "not have a primary care provider, please call [phone] and they will assist you.        Care EveryWhere ID     This is your Care EveryWhere ID. This could be used by other organizations to access your [facility] medical records  [id number]        Your Vitals Were     Pulse Temperature Respirations Height Last Period Pulse Oximetry    65 98.7  F (37.1  C) (Oral) 16 5' 4.5\" (1.638 m) 04/20/2018 (Approximate) 97%    Breastfeeding? BMI (Body Mass Index)                No 21.97 kg/m2           Blood Pressure from Last 3 Encounters:   05/04/18 110/66   04/30/18 110/64   01/15/18 110/66    Weight from Last 3 Encounters:   05/04/18 130 lb (59 kg)   04/30/18 130 lb (59 kg)   01/15/18 131 lb (59.4 kg)              Today, you had the following     No orders found for display         Today's Medication Changes          These changes are accurate as of 5/4/18  6:38 PM.  If you have any questions, ask your nurse or doctor.               Start taking these medicines.        Dose/Directions    cephALEXin 500 MG capsule   Commonly known as:  KEFLEX   Used for:  Postoperative infection, initial encounter   Started by:  Krystle Diego MD        Dose:  500 mg   Take 1 capsule (500 mg) by mouth 4 times daily   Quantity:  40 capsule   Refills:  0            Where to get your medicines      These medications were sent to [facility]pharmacy #[id number] - [place], [place] - [address]  [facility]  [address] [facility] [address]     Phone:  [phone]     cephALEXin 500 MG capsule                Primary Care Provider Office Phone # Fax #    Casa Berg [phone]       [facility] [address]  [place] [address]        Equal Access to Services     CECIL ALSTON AH: José Luis Alarcon, yolande palencia, janusz kaalmada francisca, abraham khan. So [facility] [phone].    ATENCIÓN: Si habla español, tiene a sauceda disposición servicios gratuitos de asistencia " lingüística. Venita al [phone].    We comply with applicable federal civil rights laws and [place] laws. We do not discriminate on the basis of race, color, national origin, age, disability, sex, sexual orientation, or gender identity.            Thank you!     Thank you for choosing [facility]  for your care. Our goal is always to provide you with excellent care. Hearing back from our patients is one way we can continue to improve our services. Please take a few minutes to complete the written survey that you may receive in the mail after your visit with us. Thank you!             Your Updated Medication List - Protect others around you: Learn how to safely use, store and throw away your medicines at www.disposemymeds.org.          This list is accurate as of 5/4/18  6:38 PM.  Always use your most recent med list.                   Brand Name Dispense Instructions for use Diagnosis    cephALEXin 500 MG capsule    KEFLEX    40 capsule    Take 1 capsule (500 mg) by mouth 4 times daily    Postoperative infection, initial encounter       hydrochlorothiazide 25 MG tablet    HYDRODIURIL    90 tablet    Take 1 tablet (25 mg) by mouth every morning    Hypertension goal BP (blood pressure) < 130/80       lisinopril 40 MG tablet    PRINIVIL/ZESTRIL    90 tablet    TAKE 1 TABLET DAILY    Hypertension goal BP (blood pressure) < 130/80

## 2018-05-04 NOTE — NURSING NOTE
"Chief Complaint   Patient presents with     Toenail     poosible infection, right big toe x x 4-5 days, had ingrown toenail on Monday, 4/30, having redness, oozing, swelling, pussy       Initial /66 (BP Location: Right arm, Patient Position: Chair, Cuff Size: Adult Regular)  Pulse 65  Temp 98.7  F (37.1  C) (Oral)  Resp 16  Ht 5' 4.5\" (1.638 m)  Wt 130 lb (59 kg)  LMP 04/20/2018 (Approximate)  SpO2 97%  Breastfeeding? No  BMI 21.97 kg/m2 Estimated body mass index is 21.97 kg/(m^2) as calculated from the following:    Height as of this encounter: 5' 4.5\" (1.638 m).    Weight as of this encounter: 130 lb (59 kg).  Medication Reconciliation: complete      Health Maintenance addressed:  NONE    Jamie Darden CMA  .      "

## 2018-05-04 NOTE — TELEPHONE ENCOUNTER
Pt calling back to check on this message.  Message was routed to an  podiatry pool which is not manned by anyone.  Routing to podiatry triage to advise on this.  Please advise.  Emperatriz Hinds

## 2018-05-04 NOTE — PROGRESS NOTES
SUBJECTIVE:  Chief Complaint   Patient presents with     Toenail     poosible infection, right big toe x x 4-5 days, had ingrown toenail on Monday, 4/30, having redness, oozing, swelling, pussy     Paty Johnson is a 52 year old female who presents complaining of right foot   first digit pain.  She has noted some redness and swelling along the cuticle margin.   She had partial excision of the toenail by podiatry 4/30.  She has been performing warm water soaks and applying bacitracin, but has persistent redness with some drainage at the nail margin     Symptoms began with toe infection  3-4 weeks ago.   Severity: moderate.  She denies any trauma to the area.  No fevers or chills noted.  No migration of redness or swelling proximally.    Past Medical History:   Diagnosis Date     Carcinoma in situ of cervix uteri 1993    treated with Effudex for 10 weeks     Irregular menstrual cycle     no infertility eval or problems     Unspecified essential hypertension 2007     Patient Active Problem List   Diagnosis     Hypertension goal BP (blood pressure) < 130/80     CARDIOVASCULAR SCREENING; LDL GOAL LESS THAN 130     Abnormal blood chemistry     Labial skin tag     ASCUS on Pap smear     Acute pain of right shoulder     S/P arthroscopy of shoulder       ALLERGIES:  Nkda [no known drug allergies]      Current Outpatient Prescriptions on File Prior to Visit:  hydrochlorothiazide (HYDRODIURIL) 25 MG tablet Take 1 tablet (25 mg) by mouth every morning   lisinopril (PRINIVIL/ZESTRIL) 40 MG tablet TAKE 1 TABLET DAILY     No current facility-administered medications on file prior to visit.     Social History   Substance Use Topics     Smoking status: Never Smoker     Smokeless tobacco: Never Used     Alcohol use Yes      Comment: 1-2 drinks per week       Family History   Problem Relation Age of Onset     Arthritis Sister      also has lupus     Respiratory Maternal Grandfather      Breast Cancer Paternal Grandmother       "Cancer - colorectal Paternal Grandfather      Endocrine Disease Mother      prediabetes, neuropathy, diet controlled     Hypertension Father      takes meds     C.A.D. No family hx of      CEREBROVASCULAR DISEASE No family hx of      DIABETES No family hx of        ROS:  CONSTITUTIONAL:NEGATIVE for fever, chills,   EYES: NEGATIVE for vision changes or irritation  ENT/MOUTH: NEGATIVE for ear, mouth and throat problems    OBJECTIVE:  /66 (BP Location: Right arm, Patient Position: Chair, Cuff Size: Adult Regular)  Pulse 65  Temp 98.7  F (37.1  C) (Oral)  Resp 16  Ht 5' 4.5\" (1.638 m)  Wt 130 lb (59 kg)  LMP 04/20/2018 (Approximate)  SpO2 97%  Breastfeeding? No  BMI 21.97 kg/m2  right   Foot exam:  examination of first digit reveals  redness, tenderness and swelling along the distal digit at the nail margin with some serous/ purulent drainage from the nail margin      GENERAL APPEARANCE: healthy, alert and no distress  MS:extremities normal- no gross deformities noted,  FROM noted in all extremities  NEURO: Normal strength and tone, sensory exam grossly normal,  normal speech and mentation  SKIN: no suspicious lesions or rashes      ASSESSMENT:  Postoperative infection, initial encounter     - cephALEXin (KEFLEX) 500 MG capsule; Take 1 capsule (500 mg) by mouth 4 times daily      Reviewed podiatry notes-  Rx for keflex mentioned in note- but seems that no prescription was given     The distal   toe was cleaned with antiseptic then bandaged.  .    Patient was advised to perform wound cleaning at home twice daily   patient was advised to perform warm water soaks twice daily.         "

## 2018-05-04 NOTE — TELEPHONE ENCOUNTER
Spoke with Dr. Beckford. He recommends patient be seen at urgent care to determine if an antibiotic is needed.     Phone call to patient and apologized for the delay. Recommended urgent care. Discussed [facility] or [facility]. She states she is soaking her toe twice a day and then applying Bacitracin and covering. Informed they will prescribe antibiotic if needed. She verbalized understanding.     OLVIN Boston RN

## 2018-05-22 ENCOUNTER — OFFICE VISIT (OUTPATIENT)
Dept: OTHER | Facility: CLINIC | Age: 53
End: 2018-05-22
Attending: INTERNAL MEDICINE
Payer: COMMERCIAL

## 2018-05-22 VITALS
HEART RATE: 57 BPM | BODY MASS INDEX: 22.23 KG/M2 | SYSTOLIC BLOOD PRESSURE: 118 MMHG | HEIGHT: 64 IN | OXYGEN SATURATION: 95 % | DIASTOLIC BLOOD PRESSURE: 80 MMHG | WEIGHT: 130.2 LBS

## 2018-05-22 DIAGNOSIS — Z13.6 CARDIOVASCULAR SCREENING; LDL GOAL LESS THAN 130: ICD-10-CM

## 2018-05-22 DIAGNOSIS — R94.6 BORDERLINE ABNORMAL TFTS: ICD-10-CM

## 2018-05-22 DIAGNOSIS — E78.5 HYPERLIPIDEMIA LDL GOAL <130: ICD-10-CM

## 2018-05-22 DIAGNOSIS — Z00.00 ROUTINE GENERAL MEDICAL EXAMINATION AT A HEALTH CARE FACILITY: Primary | ICD-10-CM

## 2018-05-22 DIAGNOSIS — R10.11 RUQ ABDOMINAL PAIN: ICD-10-CM

## 2018-05-22 DIAGNOSIS — I10 HYPERTENSION GOAL BP (BLOOD PRESSURE) < 130/80: ICD-10-CM

## 2018-05-22 PROCEDURE — 99213 OFFICE O/P EST LOW 20 MIN: CPT | Mod: ZP | Performed by: INTERNAL MEDICINE

## 2018-05-22 PROCEDURE — G0463 HOSPITAL OUTPT CLINIC VISIT: HCPCS

## 2018-05-22 PROCEDURE — 99396 PREV VISIT EST AGE 40-64: CPT | Mod: ZP | Performed by: INTERNAL MEDICINE

## 2018-05-22 RX ORDER — ROSUVASTATIN CALCIUM 20 MG/1
20 TABLET, COATED ORAL DAILY
Qty: 90 TABLET | Refills: 3 | Status: CANCELLED | OUTPATIENT
Start: 2018-05-22

## 2018-05-22 NOTE — NURSING NOTE
"Paty Johnson is a 52 year old female who presents for:  Chief Complaint   Patient presents with     RECHECK     follow up labs         Vitals:    Vitals:    05/22/18 0941   BP: 118/80   BP Location: Right arm   Patient Position: Chair   Cuff Size: Adult Regular   Pulse: 57   SpO2: 95%   Weight: 130 lb 3.2 oz (59.1 kg)   Height: 5' 4\" (1.626 m)       BMI:  Estimated body mass index is 22.35 kg/(m^2) as calculated from the following:    Height as of this encounter: 5' 4\" (1.626 m).    Weight as of this encounter: 130 lb 3.2 oz (59.1 kg).    Pain Score:  Data Unavailable      Do you feel safe in your environment?  Yes      Nano Mercado      "

## 2018-05-22 NOTE — PROGRESS NOTES
SUBJECTIVE:    CC: Paty Johnson is a 52 year old female who presents for:       Routine general medical examination at a health care facility  CARDIOVASCULAR SCREENING; LDL GOAL LESS THAN 130  Borderline abnormal TFTs  Hypertension goal BP (blood pressure) < 130/80        PAST MEDICAL HISTORY:                  Past Medical History:   Diagnosis Date     Carcinoma in situ of cervix uteri 1993    treated with Effudex for 10 weeks     Irregular menstrual cycle     no infertility eval or problems     Unspecified essential hypertension 2007       PAST SURGICAL HISTORY:                  Past Surgical History:   Procedure Laterality Date     C NONSPECIFIC PROCEDURE  1993    laser ablation for cervix       CURRENT MEDICATIONS:                  Current Outpatient Prescriptions   Medication Sig Dispense Refill     cephALEXin (KEFLEX) 500 MG capsule Take 1 capsule (500 mg) by mouth 4 times daily 40 capsule 0     hydrochlorothiazide (HYDRODIURIL) 25 MG tablet Take 1 tablet (25 mg) by mouth every morning 90 tablet 2     lisinopril (PRINIVIL/ZESTRIL) 40 MG tablet TAKE 1 TABLET DAILY 90 tablet 2       ALLERGIES:                  Allergies   Allergen Reactions     Nkda [No Known Drug Allergies]        SOCIAL HISTORY:                  Social History     Social History     Marital status:      Spouse name: Mac     Number of children: 3     Years of education: 16     Occupational History      None       Homemaker     Social History Main Topics     Smoking status: Never Smoker     Smokeless tobacco: Never Used     Alcohol use Yes      Comment: 1-2 drinks per week     Drug use: No     Sexual activity: Yes     Partners: Male     Birth control/ protection: Surgical      Comment:  had a vasectomy      Other Topics Concern     Exercise Yes     Seat Belt Yes     Parent/Sibling W/ Cabg, Mi Or Angioplasty Before 65f 55m? No     Social History Narrative       FAMILY HISTORY:                   Family History  "  Problem Relation Age of Onset     Arthritis Sister      also has lupus     Respiratory Maternal Grandfather      Breast Cancer Paternal Grandmother      Cancer - colorectal Paternal Grandfather      Endocrine Disease Mother      prediabetes, neuropathy, diet controlled     Hypertension Father      takes meds     C.A.D. No family hx of      CEREBROVASCULAR DISEASE No family hx of      DIABETES No family hx of              CONSTITUTIONAL: no malaise, fatigue, or other general symptoms  EYES: no subjective changes in visual acuity, no photophobia  ENT/MOUTH: no complaints of rhinorrhea, nasal congestion, sore throat, hearing changes  RESP: no SOB  CV: no c/o exertional chest pressure or QUIROZ  GI: post prandial discomfort   : no polyuria or polydipsia, no dysuria, no gross hematuria  MUSCULOSKELETAL: no arthalgias or myalgias  INTEGUMENTARY/SKIN: no pruritis, rashes, or moles with recent change in size, shape, or pigmentation  BREAST: no lumps, masses, or discharges  NEURO: no gross sensory or motor symptoms, no dizziness, no confusion  ENDOCRINE: no polyuria or polydipsia, no heat or cold intolerance  HEME/ALLERGY/IMMUNE: no fevers, chills, night sweats, or unwanted weight loss  PSYCHIATRIC: no depression, anxiety, or internal stimuli    EXAM:    /80 (BP Location: Right arm, Patient Position: Chair, Cuff Size: Adult Regular)  Pulse 57  Ht 5' 4\" (1.626 m)  Wt 130 lb 3.2 oz (59.1 kg)  SpO2 95%  BMI 22.35 kg/m2  BMI: Body mass index is 22.35 kg/(m^2).    GENERAL APPEARANCE:healthy, alert and no distress    ORGAN EXAMS:               EYES: clear conjunctiva, no cataracts, no obvious fundoscopic abnormalities               HENT: oropharynx, nares, and TMs are WNL               NECK: no JVD, thyromegaly or lymphadenopathy, no cervical bruits               RESP: clear to auscultation without rales, wheezes, or rhonchi               CV: RRR, no murmurs, gallops, or rubs               LYMPH: no cervical , " axillary, or inguinal lymphadenopathy appreciated               GI: NABS, ND/NT, no masses or organomegally appreciated               MS: no obvoius clinicallly relevant arthropathy, no evidence vasculitis               SKIN: no nevi clinically suspicious for malignancy are noted               NEURO: CN II-XII intact, no localizing sensory or motor abnoramlities noted, DTRs symmetrical bilaterally               PSYCH: Mental status exam reveals the pt to have normal mood and affect. There is no disorder of thought form or content. There is no response to internal stimuli. There is no suicidal or homicidal ideation.    Component      Latest Ref Rng & Units 4/28/2017 10/18/2017 1/15/2018   WBC      4.0 - 11.0 10e9/L 5.7     RBC Count      3.8 - 5.2 10e12/L 4.61     Hemoglobin      11.7 - 15.7 g/dL 14.6     Hematocrit      35.0 - 47.0 % 41.0     MCV      78 - 100 fl 89     MCH      26.5 - 33.0 pg 31.7     MCHC      31.5 - 36.5 g/dL 35.6     RDW      10.0 - 15.0 % 12.5     Platelet Count      150 - 450 10e9/L 320     Diff Method       Automated Method     % Neutrophils      % 57.6     % Lymphocytes      % 26.3     % Monocytes      % 13.1     % Eosinophils      % 2.8     % Basophils      % 0.2     Absolute Neutrophil      1.6 - 8.3 10e9/L 3.3     Absolute Lymphocytes      0.8 - 5.3 10e9/L 1.5     Absolute Monocytes      0.0 - 1.3 10e9/L 0.8     Absolute Eosinophils      0.0 - 0.7 10e9/L 0.2     Absolute Basophils      0.0 - 0.2 10e9/L 0.0     Sodium      133 - 144 mmol/L 139  138   Potassium      3.4 - 5.3 mmol/L 3.6  3.4   Chloride      94 - 109 mmol/L 102  102   Carbon Dioxide      20 - 32 mmol/L 26  30   Anion Gap      3 - 14 mmol/L 11  6   Glucose      70 - 99 mg/dL 95  85   Urea Nitrogen      7 - 30 mg/dL 16  17   Creatinine      0.52 - 1.04 mg/dL 0.83  0.83   GFR Estimate      >60 mL/min/1.7m2 73  72   GFR Estimate If Black      >60 mL/min/1.7m2 88  88   Calcium      8.5 - 10.1 mg/dL 9.3  9.1   Bilirubin Direct       0.0 - 0.2 mg/dL 0.1     Bilirubin Total      0.2 - 1.3 mg/dL 0.9     Albumin      3.4 - 5.0 g/dL 4.2     Protein Total      6.8 - 8.8 g/dL 7.7     Alkaline Phosphatase      40 - 150 U/L 31 (L)     ALT      0 - 50 U/L 17     AST      0 - 45 U/L 13     Cholesterol      <200 mg/dL 204 (H)     Triglycerides      <150 mg/dL 78     HDL Cholesterol      >49 mg/dL 58     LDL Cholesterol Calculated      <100 mg/dL 130 (H)     Non HDL Cholesterol      <130 mg/dL 146 (H)     Hemoglobin A1C      0 - 5.6 % 4.9     T4 Free      0.76 - 1.46 ng/dL 0.87 0.82    TSH      0.40 - 4.00 mU/L 6.17 (H) 5.96 (H)    Free T3      2.3 - 4.2 pg/mL 2.9 2.9    Thyroid Peroxidase Antibody      <35 IU/mL      Thyroid Stim Immunog      <=1.3 TSI index      Thyroglobulin Antibody      <40 IU/mL        Component      Latest Ref Rng & Units 4/20/2018   WBC      4.0 - 11.0 10e9/L 4.8   RBC Count      3.8 - 5.2 10e12/L 4.54   Hemoglobin      11.7 - 15.7 g/dL 14.2   Hematocrit      35.0 - 47.0 % 39.9   MCV      78 - 100 fl 88   MCH      26.5 - 33.0 pg 31.3   MCHC      31.5 - 36.5 g/dL 35.6   RDW      10.0 - 15.0 % 12.6   Platelet Count      150 - 450 10e9/L 312   Diff Method       Automated Method   % Neutrophils      % 55.0   % Lymphocytes      % 30.3   % Monocytes      % 12.4   % Eosinophils      % 2.1   % Basophils      % 0.2   Absolute Neutrophil      1.6 - 8.3 10e9/L 2.7   Absolute Lymphocytes      0.8 - 5.3 10e9/L 1.5   Absolute Monocytes      0.0 - 1.3 10e9/L 0.6   Absolute Eosinophils      0.0 - 0.7 10e9/L 0.1   Absolute Basophils      0.0 - 0.2 10e9/L 0.0   Sodium      133 - 144 mmol/L    Potassium      3.4 - 5.3 mmol/L    Chloride      94 - 109 mmol/L    Carbon Dioxide      20 - 32 mmol/L    Anion Gap      3 - 14 mmol/L    Glucose      70 - 99 mg/dL    Urea Nitrogen      7 - 30 mg/dL    Creatinine      0.52 - 1.04 mg/dL    GFR Estimate      >60 mL/min/1.7m2    GFR Estimate If Black      >60 mL/min/1.7m2    Calcium      8.5 - 10.1 mg/dL     Bilirubin Direct      0.0 - 0.2 mg/dL 0.2   Bilirubin Total      0.2 - 1.3 mg/dL 0.8   Albumin      3.4 - 5.0 g/dL 4.3   Protein Total      6.8 - 8.8 g/dL 7.9   Alkaline Phosphatase      40 - 150 U/L 28 (L)   ALT      0 - 50 U/L 13   AST      0 - 45 U/L 10   Cholesterol      <200 mg/dL 202 (H)   Triglycerides      <150 mg/dL 46   HDL Cholesterol      >49 mg/dL 52   LDL Cholesterol Calculated      <100 mg/dL 141 (H)   Non HDL Cholesterol      <130 mg/dL 150 (H)   Hemoglobin A1C      0 - 5.6 % 4.8   T4 Free      0.76 - 1.46 ng/dL 0.81   TSH      0.40 - 4.00 mU/L 6.54 (H)   Free T3      2.3 - 4.2 pg/mL 2.8   Thyroid Peroxidase Antibody      <35 IU/mL 12   Thyroid Stim Immunog      <=1.3 TSI index <1.0   Thyroglobulin Antibody      <40 IU/mL <20       US SOFT TISSUE HEAD/NECK   9/4/2013 10:13 AM      HISTORY: Abnormal laboratory results.      COMPARISON: None.      FINDINGS:  The thyroid gland is of normal size. No thyroid masses or  nodules are identified. The isthmus measures 0.3 cm. Background  thyroid parenchymal echogenicity is homogeneous.      IMPRESSION  IMPRESSION: Unremarkable thyroid ultrasound.      THAO DAVIES MD     ASSESSMENT/PLAN  (Z00.00) Routine general medical examination at a health care facility  (primary encounter diagnosis)  Comment: annual gyn exams  Plan: Cologard screening    (Z13.6) CARDIOVASCULAR SCREENING; LDL GOAL LESS THAN 130  Comment: see comments regarding the below  Plan: OFFICE/OUTPT VISIT,ALEXYS ALVARADO III           (E78.5) Hyperlipidemia LDL goal <130  Comment: I advised she start a statin, she would like to try diet and exercise; rtc three months for the below  Plan: LipoFit by NMR           (R94.6) Asymptomatic borderline abnormal TFTs with normal thyroid U/S  Comment: check the below, if normal check RAIU  Plan: US Thyroid, OFFICE/OUTPT VISIT,ESTLEVL III           (I10) Hypertension goal BP (blood pressure) < 130/80  Comment: at goal  Plan: OFFICE/OUTPT VISIT,ESTLEVL III            (R10.11) postprandial RUQ abdominal pain  Comment: check the below rule out gall stones  Plan: US abdominal aorta limited, UA with Microscopic           Greater than one half the 15 minutes not spent on preventive care during this visit was spent providing aducation and counselling regarding item(s) # 2 onward as delineated above.                            I have discussed with patient the risks, benefits, medications, treatment options and modalities.   I have instructed the patient to call or schedule a follow-up appointment if any problems or failure to improve.

## 2018-05-22 NOTE — MR AVS SNAPSHOT
After Visit Summary   5/22/2018    Paty Johnson    MRN: [id number]           Patient Information     Date Of Birth          1965        Visit Information        Provider Department      5/22/2018 10:00 AM Casa Berg MD [facility]      Today's Diagnoses     Routine general medical examination at a health care facility    -  1    CARDIOVASCULAR SCREENING; LDL GOAL LESS THAN 130        Hyperlipidemia LDL goal <130        Asymptomatic borderline abnormal TFTs with normal thyroid U/S        Hypertension goal BP (blood pressure) < 130/80        postprandial RUQ abdominal pain           Follow-ups after your visit        Your next 10 appointments already scheduled     May 24, 2018  9:40 AM CDT   JEANNIE Extremity with Roman Infante PT   [facility] [place] [facility] ([facility] [place])    [address]  [place] [address] [phone]            May 25, 2018 10:30 AM CDT   US ABDOMINAL AORTIC IMAGING with [email or web address]    ([facility])    [address]  [facility] [address] [phone]           Please bring a list of your medicines (including vitamins, minerals and over-the-counter drugs). Also, tell your doctor about any allergies you may have. Wear comfortable clothes and leave your valuables at home.  Adults: No eating or drinking for 8 hours before the exam. You may take medicine with a small sip of water.  Children: - Children 6+ years: No food or drink for 6 hours before exam. - Children 1-5 years: No food or drink for 4 hours before exam. - Infants, breast-fed: may have breast milk up to 2 hours before exam. - Infants, formula: may have bottle until 4 hours before exam.  Please call the Imaging Department at your exam site with any questions.            May 25, 2018 11:15 AM CDT   US THYROID with RSCCUS1    [facility] [place] ([facility])    [address]  [facility] [place] [facility] [id number] [phone]           Please bring a list of your medicines (including vitamins, minerals and over-the-counter drugs). Also, tell your doctor about any allergies you may have. Wear comfortable clothes and leave your valuables at home.  You do not need to do anything special to prepare for your exam.  Please call the Imaging Department at your exam site with any questions.            Jun 08, 2018  9:30 AM CDT   Return Visit with Casa Berg MD   [facility] ([facility])    [address]  [place] [address]   [phone]            Jul 30, 2018  8:00 AM CDT   LAB with [facility]   [facility] ([facility])    [address] [id number] [phone]           Please do not eat 10-12 hours before your appointment if you are coming in fasting for labs on lipids, cholesterol, or glucose (sugar). This does not apply to pregnant women. Water, hot tea and black coffee (with nothing added) are okay. Do not drink other fluids, diet soda or chew gum.            Aug 13, 2018  1:00 PM CDT   Return Visit with Casa Berg MD   [facility] ([facility])    [address]  [place] [address]   [phone]              Future tests that were ordered for you today     Open Future Orders        Priority Expected Expires Ordered    US abdominal aorta limited Routine 5/30/2018 5/22/2019 5/22/2018    UA with Microscopic Routine 5/29/2018 5/22/2019 5/22/2018    LipoFit by NMR Routine 8/22/2018 9/22/2018 5/22/2018    US Thyroid Routine 6/22/2018 5/22/2019 5/22/2018            Who to contact     If you have questions or need follow up information  "about today's clinic visit or your schedule please contact [facility] directly at [phone].  Normal or non-critical lab and imaging results will be communicated to you by MyChart, letter or phone within 4 business days after the clinic has received the results. If you do not hear from us within 7 days, please contact the clinic through [employer] or phone. If you have a critical or abnormal lab result, we will notify you by phone as soon as possible.  Submit refill requests through [employer] or call your pharmacy and they will forward the refill request to us. Please allow 3 business days for your refill to be completed.          Additional Information About Your Visit        [employer] Information     [employer] gives you secure access to your electronic health record. If you see a primary care provider, you can also send messages to your care team and make appointments. If you have questions, please call your primary care clinic.  If you do not have a primary care provider, please call [phone] and they will assist you.        Care EveryWhere ID     This is your Care EveryWhere ID. This could be used by other organizations to access your [place] medical records  [id number]        Your Vitals Were     Pulse Height Pulse Oximetry BMI (Body Mass Index)          57 5' 4\" (1.626 m) 95% 22.35 kg/m2         Blood Pressure from Last 3 Encounters:   05/22/18 118/80   05/04/18 110/66   04/30/18 110/64    Weight from Last 3 Encounters:   05/22/18 130 lb 3.2 oz (59.1 kg)   05/04/18 130 lb (59 kg)   04/30/18 130 lb (59 kg)              We Performed the Following     Follow-Up with Vascular Medicine     OFFICE/OUTPT VISIT,ALEXYS ALVARADO III        Primary Care Provider Office Phone # Fax #    Casa Berg [phone]       [place] [facility] [address] [place] [address]        Equal Access to Services     CECIL ALSTON AH: Hadii olya Alarcon, yolande palencia, " mellisa holcombamber feltonin hayaan adeeg kharash la'aan ah. So [facility] [phone].    ATENCIÓN: Si tomy west, tiene a sauceda disposición servicios gratuitos de asistencia lingüística. Venita al [phone].    We comply with applicable federal civil rights laws and [place] laws. We do not discriminate on the basis of race, color, national origin, age, disability, sex, sexual orientation, or gender identity.            Thank you!     Thank you for choosing [facility] [place]  for your care. Our goal is always to provide you with excellent care. Hearing back from our patients is one way we can continue to improve our services. Please take a few minutes to complete the written survey that you may receive in the mail after your visit with us. Thank you!             Your Updated Medication List - Protect others around you: Learn how to safely use, store and throw away your medicines at www.disposemymeds.org.          This list is accurate as of 5/22/18 10:34 AM.  Always use your most recent med list.                   Brand Name Dispense Instructions for use Diagnosis    cephALEXin 500 MG capsule    KEFLEX    40 capsule    Take 1 capsule (500 mg) by mouth 4 times daily    Postoperative infection, initial encounter       hydrochlorothiazide 25 MG tablet    HYDRODIURIL    90 tablet    Take 1 tablet (25 mg) by mouth every morning    Hypertension goal BP (blood pressure) < 130/80       lisinopril 40 MG tablet    PRINIVIL/ZESTRIL    90 tablet    TAKE 1 TABLET DAILY    Hypertension goal BP (blood pressure) < 130/80

## 2018-05-24 ENCOUNTER — THERAPY VISIT (OUTPATIENT)
Dept: PHYSICAL THERAPY | Facility: CLINIC | Age: 53
End: 2018-05-24
Payer: COMMERCIAL

## 2018-05-24 DIAGNOSIS — Z98.890 S/P ARTHROSCOPY OF SHOULDER: ICD-10-CM

## 2018-05-24 DIAGNOSIS — M25.511 ACUTE PAIN OF RIGHT SHOULDER: ICD-10-CM

## 2018-05-24 PROCEDURE — 97112 NEUROMUSCULAR REEDUCATION: CPT | Mod: GP | Performed by: PHYSICAL THERAPIST

## 2018-05-24 PROCEDURE — 97110 THERAPEUTIC EXERCISES: CPT | Mod: GP | Performed by: PHYSICAL THERAPIST

## 2018-05-24 NOTE — LETTER
"[facility]  [address] [place] [address]  [phone]    May 24, 2018    Re: Paty Johnson   :   1965  MRN:  [id number]   REFERRING PHYSICIAN:   Mike Rodrigez    [facility]    Date of Initial Evaluation:  2018  Visits:  Rxs Used: 8  Reason for Referral:     Acute pain of right shoulder  S/P arthroscopy of shoulder    DISCHARGE REPORT  Progress reporting period is from SOC to 2018.       SUBJECTIVE  Subjective changes noted by patient:  Patient reports shoulder is \"95%\" back to normal.  She would like to return to [facility] once she gets the OK from the surgeon.  Patient reports no functional limitations with ADL.      Current pain level is 0/10.   Changes in function:  Yes (See Goal flowsheet attached for changes in current functional level)  Adverse reaction to treatment or activity: None    OBJECTIVE  Shoulder ROM WNL.  MMT:  ER,  IR,  Add,  Abd.      ASSESSMENT/PLAN  Updated problem list and treatment plan: Diagnosis 1:  S/P Shoulder stabilization   Impaired muscle performance - home program  STG/LTGs have been met or progress has been made towards goals:  Yes (See Goal flow sheet completed today.)  Assessment of Progress: The patient's condition is improving.  The patient has met all of their long term goals.  Self Management Plans:  Patient has been instructed in a home treatment program.  Patient is independent in a home treatment program.  I have re-evaluated this patient and find that the nature, scope, duration and intensity of the therapy is appropriate for the medical condition of the patient.  Paty continues to require the following intervention to meet STG and LTG's:  PT intervention is no longer required to meet STG/LTG.                Re: Paty Johnson   :   1965          Recommendations:  This patient is ready to be discharged from therapy and continue their " home treatment program.    Thank you for your referral.    INQUIRIES  Therapist: Greg Infante PT  [facility]  [address] [place] [address]  Phone: [phone]  Fax: [phone]

## 2018-05-24 NOTE — MR AVS SNAPSHOT
After Visit Summary   5/24/2018    Paty Johnson    MRN: [id number]           Patient Information     Date Of Birth          1965        Visit Information        Provider Department      5/24/2018 9:40 AM Roman Infante PT [facility]        Today's Diagnoses     Acute pain of right shoulder        S/P arthroscopy of shoulder           Follow-ups after your visit        Your next 10 appointments already scheduled     May 25, 2018 10:30 AM CDT   US ABDOMINAL AORTIC IMAGING with [address] ([facility])    [id number] [place] [address]  [facility] [id number] [phone]           Please bring a list of your medicines (including vitamins, minerals and over-the-counter drugs). Also, tell your doctor about any allergies you may have. Wear comfortable clothes and leave your valuables at home.  Adults: No eating or drinking for 8 hours before the exam. You may take medicine with a small sip of water.  Children: - Children 6+ years: No food or drink for 6 hours before exam. - Children 1-5 years: No food or drink for 4 hours before exam. - Infants, breast-fed: may have breast milk up to 2 hours before exam. - Infants, formula: may have bottle until 4 hours before exam.  Please call the Imaging Department at your exam site with any questions.            May 25, 2018 11:15 AM CDT   US THYROID with [address] ([facility])    [address] [place] [address]  [facility] [id number] [phone]           Please bring a list of your medicines (including vitamins, minerals and over-the-counter drugs). Also, tell your doctor about any allergies you may have. Wear comfortable clothes and leave your valuables at home.  You do not need to do anything special to prepare for your exam.  Please call the Imaging Department at your exam site with any  questions.            Jun 08, 2018  9:30 AM CDT   Return Visit with Casa Berg MD   [facility] ([facility])    [address]  [facility] [address]   [phone]            Jul 30, 2018  8:00 AM CDT   LAB with [facility]   [facility] ([facility])    [address] [id number] [phone]           Please do not eat 10-12 hours before your appointment if you are coming in fasting for labs on lipids, cholesterol, or glucose (sugar). This does not apply to pregnant women. Water, hot tea and black coffee (with nothing added) are okay. Do not drink other fluids, diet soda or chew gum.            Aug 13, 2018  1:00 PM CDT   Return Visit with Casa Berg MD   [facility] ([facility])    [address]  [place] [address]   [phone]              Who to contact     If you have questions or need follow up information about today's clinic visit or your schedule please contact [facility] directly at [phone].  Normal or non-critical lab and imaging results will be communicated to you by [facility], letter or phone within 4 business days after the clinic has received the results. If you do not hear from us within 7 days, please contact the clinic through [facility] or phone. If you have a critical or abnormal lab result, we will notify you by phone as soon as possible.  Submit refill requests through [employer] or call your pharmacy and they will forward the refill request to us. Please allow 3 business days for your refill to be completed.          Additional Information About Your Visit        [employer] Information     [employer] gives you secure access to your electronic health record. If you see a primary care provider, you can  also send messages to your care team and make appointments. If you have questions, please call your primary care clinic.  If you do not have a primary care provider, please call [phone] and they will assist you.        Care EveryWhere ID     This is your Care EveryWhere ID. This could be used by other organizations to access your [place] medical records  [id number]         Blood Pressure from Last 3 Encounters:   05/22/18 118/80   05/04/18 110/66   04/30/18 110/64    Weight from Last 3 Encounters:   05/22/18 59.1 kg (130 lb 3.2 oz)   05/04/18 59 kg (130 lb)   04/30/18 59 kg (130 lb)              We Performed the Following     JEANNIE PROGRESS NOTES REPORT     NEUROMUSCULAR RE-EDUCATION     THERAPEUTIC EXERCISES        Primary Care Provider Office Phone # Fax #    Casa Berg [phone]       [facility] [address]  [place] [address]        Equal Access to Services     CECIL ALSTON AH: Hadii aad ku hadasho Soomaali, waaxda luqadaha, qaybta kaalmada adeegyada, waxay idiin hayaan adeeg kharastarr la'oriana . So [facility] [phone].    ATENCIÓN: Si habla español, tiene a sauceda disposición servicios gratuitos de asistencia lingüística. Llame al [phone].    We comply with applicable federal civil rights laws and [place] laws. We do not discriminate on the basis of race, color, national origin, age, disability, sex, sexual orientation, or gender identity.            Thank you!     Thank you for choosing [facility]  for your care. Our goal is always to provide you with excellent care. Hearing back from our patients is one way we can continue to improve our services. Please take a few minutes to complete the written survey that you may receive in the mail after your visit with us. Thank you!             Your Updated Medication List - Protect others around you: Learn how to safely use, store and throw away your medicines at www.disposemymeds.org.           This list is accurate as of 5/24/18 10:20 AM.  Always use your most recent med list.                   Brand Name Dispense Instructions for use Diagnosis    cephALEXin 500 MG capsule    KEFLEX    40 capsule    Take 1 capsule (500 mg) by mouth 4 times daily    Postoperative infection, initial encounter       hydrochlorothiazide 25 MG tablet    HYDRODIURIL    90 tablet    Take 1 tablet (25 mg) by mouth every morning    Hypertension goal BP (blood pressure) < 130/80       lisinopril 40 MG tablet    PRINIVIL/ZESTRIL    90 tablet    TAKE 1 TABLET DAILY    Hypertension goal BP (blood pressure) < 130/80

## 2018-05-25 ENCOUNTER — HOSPITAL ENCOUNTER (OUTPATIENT)
Dept: ULTRASOUND IMAGING | Facility: CLINIC | Age: 53
End: 2018-05-25
Attending: INTERNAL MEDICINE
Payer: COMMERCIAL

## 2018-05-25 ENCOUNTER — HOSPITAL ENCOUNTER (OUTPATIENT)
Dept: ULTRASOUND IMAGING | Facility: CLINIC | Age: 53
Discharge: HOME | End: 2018-05-25
Attending: INTERNAL MEDICINE | Admitting: INTERNAL MEDICINE
Payer: COMMERCIAL

## 2018-05-25 DIAGNOSIS — R10.11 RUQ ABDOMINAL PAIN: ICD-10-CM

## 2018-05-25 DIAGNOSIS — R94.6 BORDERLINE ABNORMAL TFTS: ICD-10-CM

## 2018-05-25 PROCEDURE — 76536 US EXAM OF HEAD AND NECK: CPT

## 2018-05-25 PROCEDURE — 76705 ECHO EXAM OF ABDOMEN: CPT

## 2018-06-08 ENCOUNTER — OFFICE VISIT (OUTPATIENT)
Dept: OTHER | Facility: CLINIC | Age: 53
End: 2018-06-08
Attending: INTERNAL MEDICINE
Payer: COMMERCIAL

## 2018-06-08 VITALS
BODY MASS INDEX: 22.21 KG/M2 | HEART RATE: 63 BPM | DIASTOLIC BLOOD PRESSURE: 72 MMHG | OXYGEN SATURATION: 97 % | WEIGHT: 129.4 LBS | SYSTOLIC BLOOD PRESSURE: 109 MMHG

## 2018-06-08 DIAGNOSIS — R10.11 RUQ ABDOMINAL PAIN: Primary | ICD-10-CM

## 2018-06-08 DIAGNOSIS — R94.6 BORDERLINE ABNORMAL TFTS: ICD-10-CM

## 2018-06-08 PROCEDURE — 99214 OFFICE O/P EST MOD 30 MIN: CPT | Mod: ZP | Performed by: INTERNAL MEDICINE

## 2018-06-08 PROCEDURE — G0463 HOSPITAL OUTPT CLINIC VISIT: HCPCS

## 2018-06-08 NOTE — PROGRESS NOTES
Paty Johnson is a 52 year old female who is presenting at the current time to discuss her diagnosi(es) of :       RUQ abdominal pain  Borderline abnormal TFTs .      Review Of Systems  Skin: negative  Eyes: negative  Ears/Nose/Throat: negative  Respiratory: No shortness of breath, dyspnea on exertion, cough, or hemoptysis  Cardiovascular: negative  Gastrointestinal: positive for persistent RUQ pain present when not eating but worse with eating, no nausea, no emesis  Genitourinary: negative  Musculoskeletal: negative  Neurologic: negative  Psychiatric: negative  Hematologic/Lymphatic/Immunologic: negative  Endocrine: negative      PAST MEDICAL HISTORY:                  Past Medical History:   Diagnosis Date     Carcinoma in situ of cervix uteri 1993    treated with Effudex for 10 weeks     Irregular menstrual cycle     no infertility eval or problems     Unspecified essential hypertension 2007       PAST SURGICAL HISTORY:                  Past Surgical History:   Procedure Laterality Date     C NONSPECIFIC PROCEDURE  1993    laser ablation for cervix       CURRENT MEDICATIONS:                  Current Outpatient Prescriptions   Medication Sig Dispense Refill     hydrochlorothiazide (HYDRODIURIL) 25 MG tablet Take 1 tablet (25 mg) by mouth every morning 90 tablet 2     lisinopril (PRINIVIL/ZESTRIL) 40 MG tablet TAKE 1 TABLET DAILY 90 tablet 2       ALLERGIES:                  Allergies   Allergen Reactions     Nkda [No Known Drug Allergies]        SOCIAL HISTORY:                  Social History     Social History     Marital status:      Spouse name: Mac     Number of children: 3     Years of education: 16     Occupational History      None       Homemaker     Social History Main Topics     Smoking status: Never Smoker     Smokeless tobacco: Never Used     Alcohol use Yes      Comment: 1-2 drinks per week     Drug use: No     Sexual activity: Yes     Partners: Male     Birth control/  protection: Surgical      Comment:  had a vasectomy      Other Topics Concern     Exercise Yes     Seat Belt Yes     Parent/Sibling W/ Cabg, Mi Or Angioplasty Before 65f 55m? No     Social History Narrative       FAMILY HISTORY:                   Family History   Problem Relation Age of Onset     Arthritis Sister      also has lupus     Respiratory Maternal Grandfather      Breast Cancer Paternal Grandmother      Cancer - colorectal Paternal Grandfather      Endocrine Disease Mother      prediabetes, neuropathy, diet controlled     Hypertension Father      takes meds     C.A.D. No family hx of      CEREBROVASCULAR DISEASE No family hx of      DIABETES No family hx of        Physical exam was not undertaken today. Last physical exam last week revealed no abdominal discomfort to palpation anywhere in the abdomen.        US ABDOMEN LIMITED   5/25/2018 10:39 AM      HISTORY: Right upper quadrant pain.     COMPARISON: None.     FINDINGS: The liver is unremarkable. No evidence for fatty  infiltration. No focal hepatic lesions. The gallbladder is  unremarkable, with no evidence for shadowing gallstones or gallbladder  wall thickening. Negative sonographic Pizarro sign. No intra or  extrahepatic bile duct dilatation. The common duct could not be  visualized in its entirety.  Limited evaluation of the right kidney is  unremarkable. Pancreas is partially obscured by overlying bowel gas,  but appears normal where seen.         IMPRESSION: Unremarkable right upper quadrant ultrasound. No cause for  right upper quadrant pain is identified.      THAO DAVIES MD      ULTRASOUND THYROID   5/25/2018 10:38 AM     HISTORY: Thyroid function test abnormality.      COMPARISON: None.     FINDINGS: The thyroid gland is of normal size. The right lobe of the  thyroid measures 3.4 x 1.2 x 1.6 cm. The left lobe of the thyroid  measures 2.7 x 1 x 1.1 cm. The isthmus measures 0.3 cm. Background  thyroid parenchymal echogenicity is  mildly heterogeneous. No discrete  thyroid nodules or masses are identified.         IMPRESSION: No discrete thyroid nodules or masses are seen.      THAO DAVIES MD    Component      Latest Ref Rng & Units 5/10/2007 10/1/2009 10/28/2010 9/7/2011   TSH      0.40 - 4.00 mU/L 2.62 4.84 8.41 (H) 7.43 (H)   T4 Free      0.76 - 1.46 ng/dL 0.63 (L) 0.72 0.87 0.91   Thyroid Stim Immunog      <=1.3 TSI index       Thyroid Peroxidase Antibody      <35 IU/mL       Triiodothyronine (T3)      60 - 181 ng/dL       Free T3      2.3 - 4.2 pg/mL         Component      Latest Ref Rng & Units 10/5/2011 9/11/2012 9/18/2012 10/12/2012   TSH      0.40 - 4.00 mU/L 3.93 7.17 (H) 2.52    T4 Free      0.76 - 1.46 ng/dL 0.83 0.84 0.91    Thyroid Stim Immunog      <=1.3 TSI index <1.0 . . .  TNP . . . <1.0 . . .   Thyroid Peroxidase Antibody      <35 IU/mL 11  11    Triiodothyronine (T3)      60 - 181 ng/dL 94  96    Free T3      2.3 - 4.2 pg/mL         Component      Latest Ref Rng & Units 8/19/2013 7/2/2014 9/9/2015 3/8/2016   TSH      0.40 - 4.00 mU/L 8.82 (H) 3.28 4.32 (H) 7.58 (H)   T4 Free      0.76 - 1.46 ng/dL 0.77 0.83 0.99 0.84   Thyroid Stim Immunog      <=1.3 TSI index       Thyroid Peroxidase Antibody      <35 IU/mL       Triiodothyronine (T3)      60 - 181 ng/dL       Free T3      2.3 - 4.2 pg/mL    2.9     Component      Latest Ref Rng & Units 4/28/2017 10/18/2017 4/20/2018   TSH      0.40 - 4.00 mU/L 6.17 (H) 5.96 (H) 6.54 (H)   T4 Free      0.76 - 1.46 ng/dL 0.87 0.82 0.81   Thyroid Stim Immunog      <=1.3 TSI index   <1.0   Thyroid Peroxidase Antibody      <35 IU/mL   12   Triiodothyronine (T3)      60 - 181 ng/dL      Free T3      2.3 - 4.2 pg/mL 2.9 2.9 2.8       A/P:    (R10.11) postprandial RUQ abdominal pain  (primary encounter diagnosis)  Comment: this is persistent. I will refer to [facility] for consultation if CT scan is normal to consider biliary evaluation  Plan: CT Abdomen Pelvis w Contrast, GASTROENTEROLOGY          ADULT REF CONSULT ONLY            (R94.6) Asymptomatic borderline abnormal TFTs with normal thyroid U/S  Comment: U/S normal, follow through time, I will not check RAIU - this was a misstatement in my last note, I would check this if there were a nodule to ascertain if it was cold or not  Plan: T3 Free, T4 free, TSH        For now, follow TFTs while asx      Greater than one half the twenty five minutes total spent on the pt's visit were spent providing education and counselling to the patient regarding the above matters.

## 2018-06-08 NOTE — NURSING NOTE
"Paty Johnson is a 52 year old female who presents for:  Chief Complaint   Patient presents with     RECHECK     F/U to ultrasounds        Vitals:    Vitals:    06/08/18 0922   BP: 109/72   BP Location: Right arm   Patient Position: Chair   Cuff Size: Adult Regular   Pulse: 63   SpO2: 97%   Weight: 129 lb 6.4 oz (58.7 kg)       BMI:  Estimated body mass index is 22.21 kg/(m^2) as calculated from the following:    Height as of 5/22/18: 5' 4\" (1.626 m).    Weight as of this encounter: 129 lb 6.4 oz (58.7 kg).    Pain Score:  Data Unavailable        Nighat Sun MA      "

## 2018-06-08 NOTE — MR AVS SNAPSHOT
After Visit Summary   6/8/2018    Paty Johnson    MRN: [id number]           Patient Information     Date Of Birth          1965        Visit Information        Provider Department      6/8/2018 9:30 AM Casa Berg MD [facility]      Today's Diagnoses     postprandial RUQ abdominal pain    -  1    Asymptomatic borderline abnormal TFTs with normal thyroid U/S           Follow-ups after your visit        Additional Services     GASTROENTEROLOGY ADULT REF CONSULT ONLY       Preferred Location: [facility] [phone] and Dr. Rusty Brantley      Please be aware that coverage of these services is subject to the terms and limitations of your health insurance plan.  Call member services at your health plan with any benefit or coverage questions.  Any procedures must be performed at a [facility] facility OR coordinated by your clinic's referral office.    Please bring the following with you to your appointment:    (1) Any X-Rays, CTs or MRIs which have been performed.  Contact the facility where they were done to arrange for  prior to your scheduled appointment.    (2) List of current medications   (3) This referral request   (4) Any documents/labs given to you for this referral                  Your next 10 appointments already scheduled     Jun 12, 2018  9:15 AM CDT   CT ABDOMEN PELVIS W CONTRAST with [id number] [facility] ([facility])    [address] [address] [phone]           Please bring any scans or X-rays taken at other hospitals, if similar tests were done. Also bring a list of your medicines, including vitamins, minerals and over-the-counter drugs. It is safest to leave personal items at home.  Be sure to tell your doctor:   If you have any allergies.   If there s any chance you are pregnant.   If you are breastfeeding.  How  to prepare:   Do not eat or drink for 2 hours before your exam. If you need to take medicine, you may take it with small sips of water. (We may ask you to take liquid medicine as well.)   Please wear loose clothing, such as a sweat suit or jogging clothes. Avoid snaps, zippers and other metal. We may ask you to undress and put on a hospital gown.  Please arrive 30 minutes early for your CT. Once in the department you might be asked to drink water 15-20 minutes prior to your exam.  If indicated you may be asked to drink an oral contrast in advance of your CT.  If this is the case, the imaging team will let you know or be in contact with you prior to your appointment  Patients over 70 or patients with diabetes or kidney problems:   If you haven t had a blood test (creatinine test) within the last 30 days, the Cardiologist/Radiologist may require you to get this test prior to your exam.  If you have diabetes:   Continue to take your metformin medication on the day of your exam  If you have any questions, please call the Imaging Department where you will have your exam.            Jul 30, 2018  8:00 AM CDT   LAB with [facility]   [facility] ([facility])    [address] [id number] [phone]           Please do not eat 10-12 hours before your appointment if you are coming in fasting for labs on lipids, cholesterol, or glucose (sugar). This does not apply to pregnant women. Water, hot tea and black coffee (with nothing added) are okay. Do not drink other fluids, diet soda or chew gum.            Aug 13, 2018  1:00 PM CDT   Return Visit with Casa Berg MD   [facility] ([facility])    [address]  [facility] [id number] [phone]              Future tests that were ordered for you today     Open Future Orders        Priority Expected Expires  Ordered    CT Abdomen Pelvis w Contrast Routine 7/8/2018 6/8/2019 6/8/2018    T3 Free Routine 5/8/2019 5/8/2019 6/8/2018    T4 free Routine 5/8/2019 5/8/2019 6/8/2018    TSH Routine 5/8/2019 5/8/2019 6/8/2018    CBC with platelets differential Routine 5/8/2019 5/8/2019 6/8/2018    Basic metabolic panel Routine 5/8/2019 5/8/2019 6/8/2018    Hepatic panel Routine 5/8/2019 5/8/2019 6/8/2018    Lipid Profile Routine 5/8/2019 5/8/2019 6/8/2018    Hemoglobin A1c Routine 5/8/2019 5/8/2019 6/8/2018            Who to contact     If you have questions or need follow up information about today's clinic visit or your schedule please contact [facility] directly at [phone].  Normal or non-critical lab and imaging results will be communicated to you by [employer], letter or phone within 4 business days after the clinic has received the results. If you do not hear from us within 7 days, please contact the clinic through [email or web address] or phone. If you have a critical or abnormal lab result, we will notify you by phone as soon as possible.  Submit refill requests through [employer] or call your pharmacy and they will forward the refill request to us. Please allow 3 business days for your refill to be completed.          Additional Information About Your Visit        [employer] Information     [employer] gives you secure access to your electronic health record. If you see a primary care provider, you can also send messages to your care team and make appointments. If you have questions, please call your primary care clinic.  If you do not have a primary care provider, please call [phone] and they will assist you.        Care EveryWhere ID     This is your Care EveryWhere ID. This could be used by other organizations to access your [place] medical records  [id number]        Your Vitals Were     Pulse Pulse Oximetry Breastfeeding? BMI (Body Mass Index)          63 97% No 22.21 kg/m2         Blood Pressure from Last 3  Encounters:   06/08/18 109/72   05/22/18 118/80   05/04/18 110/66    Weight from Last 3 Encounters:   06/08/18 129 lb 6.4 oz (58.7 kg)   05/22/18 130 lb 3.2 oz (59.1 kg)   05/04/18 130 lb (59 kg)              We Performed the Following     GASTROENTEROLOGY ADULT REF CONSULT ONLY        Primary Care Provider Office Phone # Fax #    Volodymyrserena Eyal Berg [phone]       [facility] [address]  [place] [address]        Equal Access to Services     [facility] : Hadii aad ku hadasho Soomaali, waaxda luqadaha, qaybta kaalmada adeegyada, abraham nuno hayoriana la . So [facility] [phone].    ATENCIÓN: Si habla español, tiene a sauceda disposición servicios gratuitos de asistencia lingüística. LlRegency [facility] [phone].    We comply with applicable federal civil rights laws and [place] laws. We do not discriminate on the basis of race, color, national origin, age, disability, sex, sexual orientation, or gender identity.            Thank you!     Thank you for choosing [facility] [place]  for your care. Our goal is always to provide you with excellent care. Hearing back from our patients is one way we can continue to improve our services. Please take a few minutes to complete the written survey that you may receive in the mail after your visit with us. Thank you!             Your Updated Medication List - Protect others around you: Learn how to safely use, store and throw away your medicines at www.disposemymeds.org.          This list is accurate as of 6/8/18 10:16 AM.  Always use your most recent med list.                   Brand Name Dispense Instructions for use Diagnosis    hydrochlorothiazide 25 MG tablet    HYDRODIURIL    90 tablet    Take 1 tablet (25 mg) by mouth every morning    Hypertension goal BP (blood pressure) < 130/80       lisinopril 40 MG tablet    PRINIVIL/ZESTRIL    90 tablet    TAKE 1 TABLET DAILY    Hypertension goal BP (blood pressure) <  130/80

## 2018-06-12 ENCOUNTER — TRANSFERRED RECORDS (OUTPATIENT)
Dept: HEALTH INFORMATION MANAGEMENT | Facility: CLINIC | Age: 53
End: 2018-06-12

## 2018-06-12 ENCOUNTER — HOSPITAL ENCOUNTER (OUTPATIENT)
Dept: CT IMAGING | Facility: CLINIC | Age: 53
Discharge: HOME | End: 2018-06-12
Attending: INTERNAL MEDICINE | Admitting: INTERNAL MEDICINE
Payer: COMMERCIAL

## 2018-06-12 DIAGNOSIS — R10.11 RUQ ABDOMINAL PAIN: ICD-10-CM

## 2018-06-12 PROCEDURE — 25000128 H RX IP 250 OP 636: Performed by: INTERNAL MEDICINE

## 2018-06-12 PROCEDURE — 74177 CT ABD & PELVIS W/CONTRAST: CPT

## 2018-06-12 RX ORDER — IOPAMIDOL 755 MG/ML
500 INJECTION, SOLUTION INTRAVASCULAR ONCE
Status: COMPLETED | OUTPATIENT
Start: 2018-06-12 | End: 2018-06-12

## 2018-06-12 RX ADMIN — SODIUM CHLORIDE 45 ML: 9 INJECTION, SOLUTION INTRAVENOUS at 09:15

## 2018-06-12 RX ADMIN — IOPAMIDOL 65 ML: 755 INJECTION, SOLUTION INTRAVENOUS at 09:15

## 2018-07-02 ENCOUNTER — TRANSFERRED RECORDS (OUTPATIENT)
Dept: HEALTH INFORMATION MANAGEMENT | Facility: CLINIC | Age: 53
End: 2018-07-02

## 2018-07-09 ENCOUNTER — HOSPITAL ENCOUNTER (OUTPATIENT)
Dept: NUCLEAR MEDICINE | Facility: CLINIC | Age: 53
Setting detail: NUCLEAR MEDICINE
Discharge: HOME | End: 2018-07-09
Attending: INTERNAL MEDICINE | Admitting: INTERNAL MEDICINE
Payer: COMMERCIAL

## 2018-07-09 DIAGNOSIS — R10.11 RIGHT UPPER QUADRANT PAIN: ICD-10-CM

## 2018-07-09 PROCEDURE — 78227 HEPATOBIL SYST IMAGE W/DRUG: CPT

## 2018-07-09 PROCEDURE — A9537 TC99M MEBROFENIN: HCPCS | Performed by: INTERNAL MEDICINE

## 2018-07-09 PROCEDURE — 34300033 ZZH RX 343: Performed by: INTERNAL MEDICINE

## 2018-07-09 RX ORDER — KIT FOR THE PREPARATION OF TECHNETIUM TC 99M MEBROFENIN 45 MG/10ML
8 INJECTION, POWDER, LYOPHILIZED, FOR SOLUTION INTRAVENOUS ONCE
Status: COMPLETED | OUTPATIENT
Start: 2018-07-09 | End: 2018-07-09

## 2018-07-09 RX ADMIN — MEBROFENIN 6 MCI.: 45 INJECTION, POWDER, LYOPHILIZED, FOR SOLUTION INTRAVENOUS at 08:08

## 2018-07-10 ENCOUNTER — TRANSFERRED RECORDS (OUTPATIENT)
Dept: HEALTH INFORMATION MANAGEMENT | Facility: CLINIC | Age: 53
End: 2018-07-10

## 2018-07-30 DIAGNOSIS — R94.6 BORDERLINE ABNORMAL TFTS: ICD-10-CM

## 2018-07-30 DIAGNOSIS — E78.5 HYPERLIPIDEMIA LDL GOAL <130: ICD-10-CM

## 2018-07-30 DIAGNOSIS — R10.11 RUQ ABDOMINAL PAIN: ICD-10-CM

## 2018-07-30 LAB
ALBUMIN UR-MCNC: NEGATIVE MG/DL
APPEARANCE UR: CLEAR
BACTERIA #/AREA URNS HPF: ABNORMAL /HPF
BASOPHILS # BLD AUTO: 0 10E9/L (ref 0–0.2)
BASOPHILS NFR BLD AUTO: 0.2 %
BILIRUB UR QL STRIP: NEGATIVE
COLOR UR AUTO: YELLOW
DIFFERENTIAL METHOD BLD: NORMAL
EOSINOPHIL # BLD AUTO: 0.1 10E9/L (ref 0–0.7)
EOSINOPHIL NFR BLD AUTO: 2.5 %
ERYTHROCYTE [DISTWIDTH] IN BLOOD BY AUTOMATED COUNT: 12.7 % (ref 10–15)
GLUCOSE UR STRIP-MCNC: NEGATIVE MG/DL
HBA1C MFR BLD: 4.9 % (ref 0–5.6)
HCT VFR BLD AUTO: 41.1 % (ref 35–47)
HGB BLD-MCNC: 14.5 G/DL (ref 11.7–15.7)
HGB UR QL STRIP: ABNORMAL
KETONES UR STRIP-MCNC: NEGATIVE MG/DL
LEUKOCYTE ESTERASE UR QL STRIP: NEGATIVE
LYMPHOCYTES # BLD AUTO: 1.4 10E9/L (ref 0.8–5.3)
LYMPHOCYTES NFR BLD AUTO: 33 %
MCH RBC QN AUTO: 31.5 PG (ref 26.5–33)
MCHC RBC AUTO-ENTMCNC: 35.3 G/DL (ref 31.5–36.5)
MCV RBC AUTO: 89 FL (ref 78–100)
MONOCYTES # BLD AUTO: 0.5 10E9/L (ref 0–1.3)
MONOCYTES NFR BLD AUTO: 11.2 %
MUCOUS THREADS #/AREA URNS LPF: PRESENT /LPF
NEUTROPHILS # BLD AUTO: 2.3 10E9/L (ref 1.6–8.3)
NEUTROPHILS NFR BLD AUTO: 53.1 %
NITRATE UR QL: NEGATIVE
NON-SQ EPI CELLS #/AREA URNS LPF: ABNORMAL /LPF
PH UR STRIP: 6 PH (ref 5–7)
PLATELET # BLD AUTO: 290 10E9/L (ref 150–450)
RBC # BLD AUTO: 4.6 10E12/L (ref 3.8–5.2)
RBC #/AREA URNS AUTO: ABNORMAL /HPF
SOURCE: ABNORMAL
SP GR UR STRIP: >1.03 (ref 1–1.03)
T3FREE SERPL-MCNC: 2.6 PG/ML (ref 2.3–4.2)
UROBILINOGEN UR STRIP-ACNC: 0.2 EU/DL (ref 0.2–1)
WBC # BLD AUTO: 4.4 10E9/L (ref 4–11)
WBC #/AREA URNS AUTO: ABNORMAL /HPF

## 2018-07-30 PROCEDURE — 80076 HEPATIC FUNCTION PANEL: CPT | Performed by: INTERNAL MEDICINE

## 2018-07-30 PROCEDURE — 80048 BASIC METABOLIC PNL TOTAL CA: CPT | Performed by: INTERNAL MEDICINE

## 2018-07-30 PROCEDURE — 85025 COMPLETE CBC W/AUTO DIFF WBC: CPT | Performed by: INTERNAL MEDICINE

## 2018-07-30 PROCEDURE — 84481 FREE ASSAY (FT-3): CPT | Performed by: INTERNAL MEDICINE

## 2018-07-30 PROCEDURE — 80061 LIPID PANEL: CPT | Mod: 90

## 2018-07-30 PROCEDURE — 84439 ASSAY OF FREE THYROXINE: CPT | Performed by: INTERNAL MEDICINE

## 2018-07-30 PROCEDURE — 81001 URINALYSIS AUTO W/SCOPE: CPT | Performed by: INTERNAL MEDICINE

## 2018-07-30 PROCEDURE — 36415 COLL VENOUS BLD VENIPUNCTURE: CPT | Performed by: INTERNAL MEDICINE

## 2018-07-30 PROCEDURE — 83036 HEMOGLOBIN GLYCOSYLATED A1C: CPT | Performed by: INTERNAL MEDICINE

## 2018-07-30 PROCEDURE — 99000 SPECIMEN HANDLING OFFICE-LAB: CPT | Performed by: INTERNAL MEDICINE

## 2018-07-30 PROCEDURE — 83704 LIPOPROTEIN BLD QUAN PART: CPT | Mod: 90 | Performed by: INTERNAL MEDICINE

## 2018-07-30 PROCEDURE — 84443 ASSAY THYROID STIM HORMONE: CPT | Performed by: INTERNAL MEDICINE

## 2018-07-31 LAB
ALBUMIN SERPL-MCNC: 4.3 G/DL (ref 3.4–5)
ALP SERPL-CCNC: 29 U/L (ref 40–150)
ALT SERPL W P-5'-P-CCNC: 18 U/L (ref 0–50)
ANION GAP SERPL CALCULATED.3IONS-SCNC: 10 MMOL/L (ref 3–14)
AST SERPL W P-5'-P-CCNC: 12 U/L (ref 0–45)
BILIRUB DIRECT SERPL-MCNC: 0.2 MG/DL (ref 0–0.2)
BILIRUB SERPL-MCNC: 0.6 MG/DL (ref 0.2–1.3)
BUN SERPL-MCNC: 13 MG/DL (ref 7–30)
CALCIUM SERPL-MCNC: 8.8 MG/DL (ref 8.5–10.1)
CHLORIDE SERPL-SCNC: 106 MMOL/L (ref 94–109)
CHOLEST SERPL-MCNC: 203 MG/DL
CO2 SERPL-SCNC: 25 MMOL/L (ref 20–32)
CREAT SERPL-MCNC: 0.84 MG/DL (ref 0.52–1.04)
GFR SERPL CREATININE-BSD FRML MDRD: 71 ML/MIN/1.7M2
GLUCOSE SERPL-MCNC: 72 MG/DL (ref 70–99)
HDLC SERPL-MCNC: 64 MG/DL
LDLC SERPL CALC-MCNC: 128 MG/DL
NONHDLC SERPL-MCNC: 139 MG/DL
POTASSIUM SERPL-SCNC: 3.6 MMOL/L (ref 3.4–5.3)
PROT SERPL-MCNC: 7.6 G/DL (ref 6.8–8.8)
SODIUM SERPL-SCNC: 141 MMOL/L (ref 133–144)
T4 FREE SERPL-MCNC: 0.73 NG/DL (ref 0.76–1.46)
TRIGL SERPL-MCNC: 55 MG/DL
TSH SERPL DL<=0.005 MIU/L-ACNC: 6.78 MU/L (ref 0.4–4)

## 2018-08-01 LAB
CHOLEST SERPL-MCNC: 213 MG/DL
HDL SERPL QN: 9.2 NM
HDL SERPL-SCNC: 38.5 UMOL/L
HDLC SERPL-MCNC: 60 MG/DL (ref 40–59)
HLD.LARGE SERPL-SCNC: 8.9 UMOL/L
LDL SERPL QN: 21.8 NM
LDL SERPL-SCNC: 1245 NMOL/L
LDL SMALL SERPL-SCNC: <165 NMOL/L
LDLC SERPL CALC-MCNC: 141 MG/DL
PATHOLOGY STUDY: ABNORMAL
TRIGL SERPL-MCNC: 61 MG/DL (ref 30–149)
VLDL LARGE SERPL-SCNC: 1.8 NMOL/L
VLDL SERPL QN: 44.6 NM

## 2018-08-13 ENCOUNTER — OFFICE VISIT (OUTPATIENT)
Dept: OTHER | Facility: CLINIC | Age: 53
End: 2018-08-13
Attending: INTERNAL MEDICINE
Payer: COMMERCIAL

## 2018-08-13 VITALS
OXYGEN SATURATION: 99 % | WEIGHT: 126 LBS | HEART RATE: 59 BPM | SYSTOLIC BLOOD PRESSURE: 109 MMHG | BODY MASS INDEX: 21.63 KG/M2 | DIASTOLIC BLOOD PRESSURE: 67 MMHG

## 2018-08-13 DIAGNOSIS — E78.5 HYPERLIPIDEMIA LDL GOAL <130: ICD-10-CM

## 2018-08-13 DIAGNOSIS — Z00.00 ROUTINE GENERAL MEDICAL EXAMINATION AT A HEALTH CARE FACILITY: ICD-10-CM

## 2018-08-13 DIAGNOSIS — R94.6 BORDERLINE ABNORMAL TFTS: ICD-10-CM

## 2018-08-13 DIAGNOSIS — I10 HYPERTENSION GOAL BP (BLOOD PRESSURE) < 130/80: ICD-10-CM

## 2018-08-13 DIAGNOSIS — R10.11 RUQ ABDOMINAL PAIN: Primary | ICD-10-CM

## 2018-08-13 PROCEDURE — G0463 HOSPITAL OUTPT CLINIC VISIT: HCPCS

## 2018-08-13 PROCEDURE — 99214 OFFICE O/P EST MOD 30 MIN: CPT | Mod: ZP | Performed by: INTERNAL MEDICINE

## 2018-08-13 NOTE — MR AVS SNAPSHOT
After Visit Summary   8/13/2018    Paty Johnson    MRN: [id number]           Patient Information     Date Of Birth          1965        Visit Information        Provider Department      8/13/2018 1:10 PM Casa Berg MD [facility]      Today's Diagnoses     postprandial RUQ abdominal pain    -  1    Hyperlipidemia LDL goal <130        Hypertension goal BP (blood pressure) < 130/80        Borderline abnormal TFTs        Routine general medical examination at a health care facility           Follow-ups after your visit        Future tests that were ordered for you today     Open Future Orders        Priority Expected Expires Ordered    T3 Free Routine 4/30/2019 4/30/2019 8/13/2018    T4 free Routine 4/30/2019 4/30/2019 8/13/2018    TSH Routine 4/30/2019 4/30/2019 8/13/2018    LipoFit by NMR Routine 4/30/2019 4/30/2019 8/13/2018    Hemoglobin A1c Routine 4/30/2019 4/30/2019 8/13/2018    Basic metabolic panel Routine 4/30/2019 4/30/2019 8/13/2018    CBC with platelets differential Routine 4/30/2019 4/30/2019 8/13/2018            Who to contact     If you have questions or need follow up information about today's clinic visit or your schedule please contact [facility] directly at [phone].  Normal or non-critical lab and imaging results will be communicated to you by MyChart, letter or phone within 4 business days after the clinic has received the results. If you do not hear from us within 7 days, please contact the clinic through MyChart or phone. If you have a critical or abnormal lab result, we will notify you by phone as soon as possible.  Submit refill requests through [employer] or call your pharmacy and they will forward the refill request to us. Please allow 3 business days for your refill to be completed.          Additional Information About Your Visit        MyChart Information      [employer] gives you secure access to your electronic health record. If you see a primary care provider, you can also send messages to your care team and make appointments. If you have questions, please call your primary care clinic.  If you do not have a primary care provider, please call [phone] and they will assist you.        Care EveryWhere ID     This is your Care EveryWhere ID. This could be used by other organizations to access your [place] medical records  [id number]        Your Vitals Were     Pulse Pulse Oximetry Breastfeeding? BMI (Body Mass Index)          59 99% No 21.63 kg/m2         Blood Pressure from Last 3 Encounters:   08/13/18 109/67   06/08/18 109/72   05/22/18 118/80    Weight from Last 3 Encounters:   08/13/18 126 lb (57.2 kg)   06/08/18 129 lb 6.4 oz (58.7 kg)   05/22/18 130 lb 3.2 oz (59.1 kg)               Primary Care Provider Office Phone # Fax #    Casa Eyal Berg [phone] [address]  [facility] [address]        Equal Access to Services     [facility]: Hadii aad ku hadasho Soomaali, waaxda luqadaha, qaybta kaalmada adeegyada, abraham la . So [facility] [place] [phone].    ATENCIÓN: Si habla español, tiene a sauceda disposición servicios gratuitos de asistencia lingüística. Ll[facility] [phone].    We comply with applicable federal civil rights laws and [place] laws. We do not discriminate on the basis of race, color, national origin, age, disability, sex, sexual orientation, or gender identity.            Thank you!     Thank you for choosing [facility] [place]  for your care. Our goal is always to provide you with excellent care. Hearing back from our patients is one way we can continue to improve our services. Please take a few minutes to complete the written survey that you may receive in the mail after your visit with us. Thank you!             Your Updated Medication List - Protect others around  you: Learn how to safely use, store and throw away your medicines at www.disposemymeds.org.          This list is accurate as of 8/13/18  2:27 PM.  Always use your most recent med list.                   Brand Name Dispense Instructions for use Diagnosis    hydrochlorothiazide 25 MG tablet    HYDRODIURIL    90 tablet    Take 1 tablet (25 mg) by mouth every morning    Hypertension goal BP (blood pressure) < 130/80       lisinopril 40 MG tablet    PRINIVIL/ZESTRIL    90 tablet    TAKE 1 TABLET DAILY    Hypertension goal BP (blood pressure) < 130/80

## 2018-08-13 NOTE — NURSING NOTE
"Paty Johnson is a 53 year old female who presents for:  Chief Complaint   Patient presents with     RECHECK     F/U to NMR lab        Vitals:    Vitals:    08/13/18 1314   BP: 109/67   BP Location: Right arm   Patient Position: Chair   Cuff Size: Adult Regular   Pulse: 59   SpO2: 99%   Weight: 126 lb (57.2 kg)       BMI:  Estimated body mass index is 21.63 kg/(m^2) as calculated from the following:    Height as of 5/22/18: 5' 4\" (1.626 m).    Weight as of this encounter: 126 lb (57.2 kg).    Pain Score:  Data Unavailable        Danielle Ren"

## 2018-08-13 NOTE — PROGRESS NOTES
Paty Johnson is a 53 year old female who is presenting at the current time to discuss her diagnosi(es) of :       RUQ abdominal pain  Hyperlipidemia LDL goal <130  Hypertension goal BP (blood pressure) < 130/80  Borderline abnormal TFTs  Routine general medical examination at a health care facility .      Review Of Systems  Skin: negative  Eyes: negative  Ears/Nose/Throat: negative  Respiratory: No shortness of breath, dyspnea on exertion, cough, or hemoptysis  Cardiovascular: negative  Gastrointestinal: post prandial RUQ pain  Genitourinary: negative  Musculoskeletal: negative  Neurologic: negative  Psychiatric: negative  Hematologic/Lymphatic/Immunologic: negative  Endocrine: negative    PAST MEDICAL HISTORY:                  Past Medical History:   Diagnosis Date     Carcinoma in situ of cervix uteri 1993    treated with Effudex for 10 weeks     Irregular menstrual cycle     no infertility eval or problems     Unspecified essential hypertension 2007       PAST SURGICAL HISTORY:                  Past Surgical History:   Procedure Laterality Date     C NONSPECIFIC PROCEDURE  1993    laser ablation for cervix       CURRENT MEDICATIONS:                  Current Outpatient Prescriptions   Medication Sig Dispense Refill     hydrochlorothiazide (HYDRODIURIL) 25 MG tablet Take 1 tablet (25 mg) by mouth every morning 90 tablet 2     lisinopril (PRINIVIL/ZESTRIL) 40 MG tablet TAKE 1 TABLET DAILY 90 tablet 2       ALLERGIES:                  Allergies   Allergen Reactions     Nkda [No Known Drug Allergies]        SOCIAL HISTORY:                  Social History     Social History     Marital status:      Spouse name: Mac     Number of children: 3     Years of education: 16     Occupational History      None       Homemaker     Social History Main Topics     Smoking status: Never Smoker     Smokeless tobacco: Never Used     Alcohol use Yes      Comment: 1-2 drinks per week     Drug use: No      Sexual activity: Yes     Partners: Male     Birth control/ protection: Surgical      Comment:  had a vasectomy      Other Topics Concern     Exercise Yes     Seat Belt Yes     Parent/Sibling W/ Cabg, Mi Or Angioplasty Before 65f 55m? No     Social History Narrative       FAMILY HISTORY:                   Family History   Problem Relation Age of Onset     Arthritis Sister      also has lupus     Respiratory Maternal Grandfather      Breast Cancer Paternal Grandmother      Cancer - colorectal Paternal Grandfather      Endocrine Disease Mother      prediabetes, neuropathy, diet controlled     Hypertension Father      takes meds     C.A.D. No family hx of      Cerebrovascular Disease No family hx of      Diabetes No family hx of           Physical exam Reveals:    O/P: WNL  HEENT: WNL  NECK: No JVD, thyromegaly, or lymphadenopathy  HEART: RRR, no murmurs, gallops, or rubs  LUNGS: CTA bilaterally without rales, wheezes, or rhonchi  GI: NABS, nondistended, nontender, soft  EXT:without cyanosis, clubbing, or edema  NEURO: nonfocal  : no flank tenderness    Component      Latest Ref Rng & Units 4/20/2018 7/30/2018            7:53 AM   WBC      4.0 - 11.0 10e9/L 4.8 4.4   RBC Count      3.8 - 5.2 10e12/L 4.54 4.60   Hemoglobin      11.7 - 15.7 g/dL 14.2 14.5   Hematocrit      35.0 - 47.0 % 39.9 41.1   MCV      78 - 100 fl 88 89   MCH      26.5 - 33.0 pg 31.3 31.5   MCHC      31.5 - 36.5 g/dL 35.6 35.3   RDW      10.0 - 15.0 % 12.6 12.7   Platelet Count      150 - 450 10e9/L 312 290   Diff Method       Automated Method Automated Method   % Neutrophils      % 55.0 53.1   % Lymphocytes      % 30.3 33.0   % Monocytes      % 12.4 11.2   % Eosinophils      % 2.1 2.5   % Basophils      % 0.2 0.2   Absolute Neutrophil      1.6 - 8.3 10e9/L 2.7 2.3   Absolute Lymphocytes      0.8 - 5.3 10e9/L 1.5 1.4   Absolute Monocytes      0.0 - 1.3 10e9/L 0.6 0.5   Absolute Eosinophils      0.0 - 0.7 10e9/L 0.1 0.1   Absolute Basophils       0.0 - 0.2 10e9/L 0.0 0.0   Color Urine        Yellow   Appearance Urine        Clear   Glucose Urine      NEG:Negative mg/dL  Negative   Bilirubin Urine      NEG:Negative  Negative   Ketones Urine      NEG:Negative mg/dL  Negative   Specific Gravity Urine      1.003 - 1.035  >1.030   pH Urine      5.0 - 7.0 pH  6.0   Protein Albumin Urine      NEG:Negative mg/dL  Negative   Urobilinogen Urine      0.2 - 1.0 EU/dL  0.2   Nitrite Urine      NEG:Negative  Negative   Blood Urine      NEG:Negative  Trace (A)   Leukocyte Esterase Urine      NEG:Negative  Negative   Source        Midstream Urine   WBC Urine      OTO5:0 - 5 /HPF  0 - 5   RBC Urine      OTO2:O - 2 /HPF  2-5 (A)   Squamous Epithelial /LPF Urine      FEW:Few /LPF  Few   Bacteria Urine      NEG:Negative /HPF  Few (A)   Mucous Urine      NEG:Negative /LPF  Present (A)   Total Cholesterol      <=199 mg/dL     Triglycerides      <150 mg/dL 46 55   HDL Cholesterol      40 - 59 mg/dL     LDL Cholesterol      <=129 mg/dL     HDL Size      >=8.9 nm     VLDL Size      <=46.7 nm     LDL Particle Size      >=20.7 nm     Lge HDL Particle number      >=4.2 umol/L     HDL Particle Number NMR      >=33.0 umol/L     Lge VLDL Part number      <=2.7 nmol/L     Small LDL Particle number      <=634 nmol/L     LDL Particle Number      <=1135 nmol/L     EER LipoFit by NMR           Sodium      133 - 144 mmol/L  141   Potassium      3.4 - 5.3 mmol/L  3.6   Chloride      94 - 109 mmol/L  106   Carbon Dioxide      20 - 32 mmol/L  25   Anion Gap      3 - 14 mmol/L  10   Glucose      70 - 99 mg/dL  72   Urea Nitrogen      7 - 30 mg/dL  13   Creatinine      0.52 - 1.04 mg/dL  0.84   GFR Estimate      >60 mL/min/1.7m2  71   GFR Estimate If Black      >60 mL/min/1.7m2  86   Calcium      8.5 - 10.1 mg/dL  8.8   Bilirubin Direct      0.0 - 0.2 mg/dL 0.2 0.2   Bilirubin Total      0.2 - 1.3 mg/dL 0.8 0.6   Albumin      3.4 - 5.0 g/dL 4.3 4.3   Protein Total      6.8 - 8.8 g/dL 7.9 7.6    Alkaline Phosphatase      40 - 150 U/L 28 (L) 29 (L)   ALT      0 - 50 U/L 13 18   AST      0 - 45 U/L 10 12   Cholesterol      <200 mg/dL 202 (H) 203 (H)   HDL Cholesterol      >49 mg/dL 52 64   LDL Cholesterol Calculated      <100 mg/dL 141 (H) 128 (H)   Non HDL Cholesterol      <130 mg/dL 150 (H) 139 (H)   Free T3      2.3 - 4.2 pg/mL 2.8 2.6   T4 Free      0.76 - 1.46 ng/dL 0.81 0.73 (L)   TSH      0.40 - 4.00 mU/L 6.54 (H) 6.78 (H)   Hemoglobin A1C      0 - 5.6 % 4.8 4.9   Thyroid Peroxidase Antibody      <35 IU/mL 12    Thyroid Stim Immunog      <=1.3 TSI index <1.0    Thyroglobulin Antibody      <40 IU/mL <20      Component      Latest Ref Rng & Units 7/30/2018           7:53 AM   WBC      4.0 - 11.0 10e9/L    RBC Count      3.8 - 5.2 10e12/L    Hemoglobin      11.7 - 15.7 g/dL    Hematocrit      35.0 - 47.0 %    MCV      78 - 100 fl    MCH      26.5 - 33.0 pg    MCHC      31.5 - 36.5 g/dL    RDW      10.0 - 15.0 %    Platelet Count      150 - 450 10e9/L    Diff Method          % Neutrophils      %    % Lymphocytes      %    % Monocytes      %    % Eosinophils      %    % Basophils      %    Absolute Neutrophil      1.6 - 8.3 10e9/L    Absolute Lymphocytes      0.8 - 5.3 10e9/L    Absolute Monocytes      0.0 - 1.3 10e9/L    Absolute Eosinophils      0.0 - 0.7 10e9/L    Absolute Basophils      0.0 - 0.2 10e9/L    Color Urine          Appearance Urine          Glucose Urine      NEG:Negative mg/dL    Bilirubin Urine      NEG:Negative    Ketones Urine      NEG:Negative mg/dL    Specific Gravity Urine      1.003 - 1.035    pH Urine      5.0 - 7.0 pH    Protein Albumin Urine      NEG:Negative mg/dL    Urobilinogen Urine      0.2 - 1.0 EU/dL    Nitrite Urine      NEG:Negative    Blood Urine      NEG:Negative    Leukocyte Esterase Urine      NEG:Negative    Source          WBC Urine      OTO5:0 - 5 /HPF    RBC Urine      OTO2:O - 2 /HPF    Squamous Epithelial /LPF Urine      FEW:Few /LPF    Bacteria Urine       NEG:Negative /HPF    Mucous Urine      NEG:Negative /LPF    Total Cholesterol      <=199 mg/dL 213 (H)   Triglycerides      <150 mg/dL 61   HDL Cholesterol      40 - 59 mg/dL 60 (H)   LDL Cholesterol      <=129 mg/dL 141 (H)   HDL Size      >=8.9 nm 9.2   VLDL Size      <=46.7 nm 44.6   LDL Particle Size      >=20.7 nm 21.8   Lge HDL Particle number      >=4.2 umol/L 8.9   HDL Particle Number NMR      >=33.0 umol/L 38.5   Lge VLDL Part number      <=2.7 nmol/L 1.8   Small LDL Particle number      <=634 nmol/L <165   LDL Particle Number      <=1135 nmol/L 1245 (H)   EER LipoFit by NMR       SEE NOTE   Sodium      133 - 144 mmol/L    Potassium      3.4 - 5.3 mmol/L    Chloride      94 - 109 mmol/L    Carbon Dioxide      20 - 32 mmol/L    Anion Gap      3 - 14 mmol/L    Glucose      70 - 99 mg/dL    Urea Nitrogen      7 - 30 mg/dL    Creatinine      0.52 - 1.04 mg/dL    GFR Estimate      >60 mL/min/1.7m2    GFR Estimate If Black      >60 mL/min/1.7m2    Calcium      8.5 - 10.1 mg/dL    Bilirubin Direct      0.0 - 0.2 mg/dL    Bilirubin Total      0.2 - 1.3 mg/dL    Albumin      3.4 - 5.0 g/dL    Protein Total      6.8 - 8.8 g/dL    Alkaline Phosphatase      40 - 150 U/L    ALT      0 - 50 U/L    AST      0 - 45 U/L    Cholesterol      <200 mg/dL    HDL Cholesterol      >49 mg/dL    LDL Cholesterol Calculated      <100 mg/dL    Non HDL Cholesterol      <130 mg/dL    Free T3      2.3 - 4.2 pg/mL    T4 Free      0.76 - 1.46 ng/dL    TSH      0.40 - 4.00 mU/L    Hemoglobin A1C      0 - 5.6 %    Thyroid Peroxidase Antibody      <35 IU/mL    Thyroid Stim Immunog      <=1.3 TSI index    Thyroglobulin Antibody      <40 IU/mL        A/P:    (R10.11) postprandial RUQ abdominal pain  (primary encounter diagnosis)  Comment: this is prsistent. HIDA scan is normal. RUQ U/S and CT scan were normal.  Plan: go through with EGD and colonosocpy as recommended by hepatobiliary. COnsider Sphincter of Oddi dysfunction if the above are  normal    (E78.5) Hyperlipidemia LDL goal <130  Comment: , LDL-P 1247  Plan: LipoFit by NMR, Hemoglobin A1c, Basic metabolic        panel        offereed TLC or meds; she chose TLC; will repeat labs in 8 months    (I10) Hypertension goal BP (blood pressure) < 130/80  Comment: at [facility]  Plan: no med changes    (R94.6) Borderline abnormal TFTs  Comment: stable, montior  Plan: T3 Free, T4 free, TSH, LipoFit by NMR, CBC with        platelets differential           (Z00.00) Routine general medical examination at a health care facility  Comment: check the below in April  Plan: T3 Free, T4 free, TSH, LipoFit by NMR,         Hemoglobin A1c, Basic metabolic panel, CBC with        platelets differential

## 2018-08-31 ENCOUNTER — VIRTUAL VISIT (OUTPATIENT)
Dept: FAMILY MEDICINE | Facility: OTHER | Age: 53
End: 2018-08-31

## 2018-08-31 NOTE — PROGRESS NOTES
"Date:   Clinician: Terrence Oliveira  Clinician NPI: [id number]  Patient: Paty Johnson  Patient : 1965  Patient Address: [address] [place] [address]  Patient Phone: [phone]  Visit Protocol: Yeast infection  Patient Summary:  Paty is a 53 year old ( : 1965 ) female who initiated a Visit for a presumed vaginal yeast infection. When asked the question \"Please sign me up to receive news, health information and promotions from [employer].\", Paty responded \"No\".    Paty began noticing perivulvar pruritus, vaginal pruritus, and vaginal discharge 0-5 days ago. She has a more than normal amount of thick, clear or white, malodorous, chunky (like cottage cheese) discharge.   She denies having abdominal pain, open sores, and perivulvar rash. She also denies feeling feverish.   Paty has a history of vaginal yeast infections. She has had zero (0) occurrences in the past year and the current symptoms are similar to previous yeast infections. She has used fluconazole (Diflucan) to treat previous yeast infections. She sure is not if she has needed 1 or 2 doses of fluconazole (Diflucan) for symptoms to resolve in the past.   She has attempted to treat her symptoms with anti-fungal suppositories for yeast infections and anti-fungal cream for yeast infections and has used the anti-fungal medication as directed. Anti-fungal medication used to treat symptoms as reported by the patient (free text): miconazole 1   She prefers a fluconazole (Diflucan) Pill.   She denies taking antibiotics in the past 2 weeks. She denies having a sexually transmitted disease.   She denies pregnancy and denies breastfeeding. She has menstruated in the past month.   She does NOT smoke or use smokeless tobacco.    MEDICATIONS: lisinopril-hydrochlorothiazide oral, lisinopril oral, ALLERGIES: NKDA  Clinician Response:  Dear Paty,  Based on the information you have provided, you likely have a vaginal yeast " infection which is a common infection of the vagina caused by a fungus.  I am prescribing:   Fluconazole (Diflucan) 150 mg oral tablet. Take 1 tablet by mouth in a single dose. There are no refills with this prescription.  While you have yeast infection symptoms, do the following:      Avoid irritants such as scented bath products, tampons, pads, or vaginal sprays and powders.    Avoid douching.    Wear cotton underwear and if you are comfortable doing so, do not wear underwear to bed.    Avoid hot tubs and whirlpool spas.     Symptoms should improve with 1-2 days of treatment and resolve entirely in 5-7 days. However, there are other types of vaginal infections that have some of the same symptoms as a yeast infection. For this reason, please be seen in person if symptoms have not improved after 3 days or resolved after 10 days.   Diagnosis: Candida Vulvovaginitis  Diagnosis ICD: B37.3  Prescription: fluconazole (Diflucan) 150 mg oral tablet 1 tablet, 1 days supply. Take 1 tablet by mouth in a single dose. Refills: 0, Refill as needed: no, Allow substitutions: yes  Pharmacy: [id number] - [phone] - [id number]  [place] [address]

## 2018-09-28 ENCOUNTER — OFFICE VISIT (OUTPATIENT)
Dept: URGENT CARE | Facility: URGENT CARE | Age: 53
End: 2018-09-28
Payer: COMMERCIAL

## 2018-09-28 VITALS
BODY MASS INDEX: 21.63 KG/M2 | DIASTOLIC BLOOD PRESSURE: 72 MMHG | WEIGHT: 126 LBS | HEART RATE: 90 BPM | TEMPERATURE: 98 F | SYSTOLIC BLOOD PRESSURE: 118 MMHG | OXYGEN SATURATION: 99 %

## 2018-09-28 DIAGNOSIS — J01.90 ACUTE SINUSITIS WITH SYMPTOMS > 10 DAYS: Primary | ICD-10-CM

## 2018-09-28 PROCEDURE — 99213 OFFICE O/P EST LOW 20 MIN: CPT | Performed by: PHYSICIAN ASSISTANT

## 2018-09-28 RX ORDER — CODEINE PHOSPHATE AND GUAIFENESIN 10; 100 MG/5ML; MG/5ML
2 SOLUTION ORAL
Qty: 120 ML | Refills: 0 | Status: SHIPPED | OUTPATIENT
Start: 2018-09-28 | End: 2019-11-06

## 2018-09-28 RX ORDER — BENZONATATE 200 MG/1
200 CAPSULE ORAL 3 TIMES DAILY PRN
Qty: 21 CAPSULE | Refills: 0 | Status: SHIPPED | OUTPATIENT
Start: 2018-09-28 | End: 2019-11-06

## 2018-09-28 ASSESSMENT — ENCOUNTER SYMPTOMS
CHILLS: 0
COUGH: 1
DIARRHEA: 0
VOMITING: 0
SORE THROAT: 0
HEADACHES: 1
SHORTNESS OF BREATH: 0
RHINORRHEA: 1
FEVER: 0
SINUS PRESSURE: 1
SINUS PAIN: 1
NAUSEA: 0

## 2018-09-28 NOTE — PROGRESS NOTES
SUBJECTIVE:   Paty Johnson is a 53 year old female presenting with a chief complaint of   Chief Complaint   Patient presents with     Urgent Care     URI     Started Monday with sore throat, pressure in face and ears, teeth pain and cough        She is an established patient of [facility].    URI Adult    Onset of symptoms was 1 week(s) ago.  Course of illness is worsening.    Severity moderate  Current and Associated symptoms: stuffy nose, cough - non-productive, facial pain/pressure, headache, post nasal drip, fatigue, teeth are hurting  Treatment measures tried include Tylenol/Ibuprofen, Decongestants and Antihistamine.  Predisposing factors include None.  Denies f/c/n/v/d.      Review of Systems   Constitutional: Negative for chills and fever.   HENT: Positive for congestion, ear pain, postnasal drip, rhinorrhea, sinus pain and sinus pressure. Negative for sore throat.    Respiratory: Positive for cough. Negative for shortness of breath.    Gastrointestinal: Negative for diarrhea, nausea and vomiting.   Neurological: Positive for headaches.       Past Medical History:   Diagnosis Date     Carcinoma in situ of cervix uteri 1993    treated with Effudex for 10 weeks     Irregular menstrual cycle     no infertility eval or problems     Unspecified essential hypertension 2007     Family History   Problem Relation Age of Onset     Arthritis Sister      also has lupus     Respiratory Maternal Grandfather      Breast Cancer Paternal Grandmother      Cancer - colorectal Paternal Grandfather      Endocrine Disease Mother      prediabetes, neuropathy, diet controlled     Hypertension Father      takes meds     C.A.D. No family hx of      Cerebrovascular Disease No family hx of      Diabetes No family hx of      Current Outpatient Prescriptions   Medication Sig Dispense Refill     amoxicillin-clavulanate (AUGMENTIN) 875-125 MG per tablet Take 1 tablet by mouth 2 times daily 20 tablet 0     benzonatate (TESSALON) 200  MG capsule Take 1 capsule (200 mg) by mouth 3 times daily as needed for cough 21 capsule 0     guaiFENesin-codeine (ROBITUSSIN AC) 100-10 MG/5ML SOLN solution Take 10 mLs by mouth nightly as needed for cough 120 mL 0     hydrochlorothiazide (HYDRODIURIL) 25 MG tablet Take 1 tablet (25 mg) by mouth every morning 90 tablet 2     lisinopril (PRINIVIL/ZESTRIL) 40 MG tablet TAKE 1 TABLET DAILY 90 tablet 2     Social History   Substance Use Topics     Smoking status: Never Smoker     Smokeless tobacco: Never Used     Alcohol use Yes      Comment: 1-2 drinks per week       OBJECTIVE  /72  Pulse 90  Temp 98  F (36.7  C) (Oral)  Wt 126 lb (57.2 kg)  SpO2 99%  BMI 21.63 kg/m2    Physical Exam   Constitutional: She appears well-developed and well-nourished. No distress.   HENT:   Head: Normocephalic and atraumatic.   Right Ear: Tympanic membrane and external ear normal.   Left Ear: Tympanic membrane and external ear normal.   Mouth/Throat: Oropharynx is clear and moist. No oropharyngeal exudate.   Nasal passages with boggy turbinates, Maxillary sinuses are tender to percussion   Eyes: Conjunctivae are normal.   Neck: Normal range of motion. Neck supple.   Cardiovascular: Regular rhythm and normal heart sounds.    Pulmonary/Chest: Effort normal and breath sounds normal. No respiratory distress.   Neurological: She is alert.   Skin: Skin is warm and dry.   Psychiatric: She has a normal mood and affect.       Labs:  No results found for this or any previous visit (from the past 24 hour(s)).        ASSESSMENT:      ICD-10-CM    1. Acute sinusitis with symptoms > 10 days J01.90 amoxicillin-clavulanate (AUGMENTIN) 875-125 MG per tablet     benzonatate (TESSALON) 200 MG capsule     guaiFENesin-codeine (ROBITUSSIN AC) 100-10 MG/5ML SOLN solution        Medical Decision Making:    Differential Diagnosis:  URI Adult/Peds:  Sinusitis, Viral syndrome and Viral upper respiratory illness    Serious Comorbid Conditions:  Adult:   None    PLAN:    Acute sinusitis: Augmentin Rx. Tessalon perles prn cough. Robitussin with codeine prn cough at night.Tylenol or motrin prn headache . May continue OTC decongestant.  Follow up if any worsening symptoms. Patient agrees.     Followup:    If not improving or if condition worsens, follow up with your Primary Care Provider

## 2018-09-28 NOTE — MR AVS SNAPSHOT
After Visit Summary   9/28/2018    Paty Johnson    MRN: [id number]           Patient Information     Date Of Birth          1965        Visit Information        Provider Department      9/28/2018 1:30 PM Kath Silva PA-C [facility]        Today's Diagnoses     Acute sinusitis with symptoms > 10 days    -  1       Follow-ups after your visit        Who to contact     If you have questions or need follow up information about today's clinic visit or your schedule please contact [facility] directly at [phone].  Normal or non-critical lab and imaging results will be communicated to you by BitStashhart, letter or phone within 4 business days after the clinic has received the results. If you do not hear from us within 7 days, please contact the clinic through BitStashhart or phone. If you have a critical or abnormal lab result, we will notify you by phone as soon as possible.  Submit refill requests through Mediasurface or call your pharmacy and they will forward the refill request to us. Please allow 3 business days for your refill to be completed.          Additional Information About Your Visit        MyChart Information     [employer] gives you secure access to your electronic health record. If you see a primary care provider, you can also send messages to your care team and make appointments. If you have questions, please call your primary care clinic.  If you do not have a primary care provider, please call [phone] and they will assist you.        Care EveryWhere ID     This is your Care EveryWhere ID. This could be used by other organizations to access your [place] medical records  [id number]        Your Vitals Were     Pulse Temperature Pulse Oximetry BMI (Body Mass Index)          90 98  F (36.7  C) (Oral) 99% 21.63 kg/m2         Blood Pressure from Last 3 Encounters:   09/28/18 118/72   08/13/18 109/67   06/08/18 109/72    Weight from Last 3  Encounters:   09/28/18 126 lb (57.2 kg)   08/13/18 126 lb (57.2 kg)   06/08/18 129 lb 6.4 oz (58.7 kg)              Today, you had the following     No orders found for display         Today's Medication Changes          These changes are accurate as of 9/28/18  2:29 PM.  If you have any questions, ask your nurse or doctor.               Start taking these medicines.        Dose/Directions    amoxicillin-clavulanate 875-125 MG per tablet   Commonly known as:  AUGMENTIN   Used for:  Acute sinusitis with symptoms > 10 days   Started by:  Kath Silva PA-C        Dose:  1 tablet   Take 1 tablet by mouth 2 times daily   Quantity:  20 tablet   Refills:  0       benzonatate 200 MG capsule   Commonly known as:  TESSALON   Used for:  Acute sinusitis with symptoms > 10 days   Started by:  Kath Silva PA-C        Dose:  200 mg   Take 1 capsule (200 mg) by mouth 3 times daily as needed for cough   Quantity:  21 capsule   Refills:  0       guaiFENesin-codeine 100-10 MG/5ML Soln solution   Commonly known as:  ROBITUSSIN AC   Used for:  Acute sinusitis with symptoms > 10 days   Started by:  Kath Silva PA-C        Dose:  2 tsp.   Take 10 mLs by mouth nightly as needed for cough   Quantity:  120 mL   Refills:  0            Where to get your medicines      These medications were sent to [facility]/pharmacy #[id number] - [place], [place] - [address]  [address]   [address] [address]     Phone:  [phone]     amoxicillin-clavulanate 875-125 MG per tablet    benzonatate 200 MG capsule         Some of these will need a paper prescription and others can be bought over the counter.  Ask your nurse if you have questions.     Bring a paper prescription for each of these medications     guaiFENesin-codeine 100-10 MG/5ML Soln solution                Primary Care Provider Office Phone # Fax #    [facility] [phone] [address]  [place] [address]        Equal  Access to Services     [facility]: Hadii aad ku hadbro Alarcon, waaxda luqadaha, qaybta kaalmasondra hortoneliudsondra, abraham gonzalezdelorisstarr khan. So [facility] [phone].    ATENCIÓN: Si habla brett, tiene a sauceda disposición servicios gratuitos de asistencia lingüística. Llame al [phone].    We comply with applicable federal civil rights laws and [place] laws. We do not discriminate on the basis of race, color, national origin, age, disability, sex, sexual orientation, or gender identity.            Thank you!     Thank you for choosing [facility]  for your care. Our goal is always to provide you with excellent care. Hearing back from our patients is one way we can continue to improve our services. Please take a few minutes to complete the written survey that you may receive in the mail after your visit with us. Thank you!             Your Updated Medication List - Protect others around you: Learn how to safely use, store and throw away your medicines at www.disposemymeds.org.          This list is accurate as of 9/28/18  2:29 PM.  Always use your most recent med list.                   Brand Name Dispense Instructions for use Diagnosis    amoxicillin-clavulanate 875-125 MG per tablet    AUGMENTIN    20 tablet    Take 1 tablet by mouth 2 times daily    Acute sinusitis with symptoms > 10 days       benzonatate 200 MG capsule    TESSALON    21 capsule    Take 1 capsule (200 mg) by mouth 3 times daily as needed for cough    Acute sinusitis with symptoms > 10 days       guaiFENesin-codeine 100-10 MG/5ML Soln solution    ROBITUSSIN AC    120 mL    Take 10 mLs by mouth nightly as needed for cough    Acute sinusitis with symptoms > 10 days       hydrochlorothiazide 25 MG tablet    HYDRODIURIL    90 tablet    Take 1 tablet (25 mg) by mouth every morning    Hypertension goal BP (blood pressure) < 130/80       lisinopril 40 MG tablet    PRINIVIL/ZESTRIL    90 tablet    TAKE 1 TABLET DAILY     Hypertension goal BP (blood pressure) < 130/80

## 2018-10-25 ENCOUNTER — TRANSFERRED RECORDS (OUTPATIENT)
Dept: HEALTH INFORMATION MANAGEMENT | Facility: CLINIC | Age: 53
End: 2018-10-25

## 2018-11-25 ENCOUNTER — HEALTH MAINTENANCE LETTER (OUTPATIENT)
Age: 53
End: 2018-11-25

## 2018-12-04 ENCOUNTER — TRANSFERRED RECORDS (OUTPATIENT)
Dept: HEALTH INFORMATION MANAGEMENT | Facility: CLINIC | Age: 53
End: 2018-12-04

## 2018-12-29 DIAGNOSIS — I10 HYPERTENSION GOAL BP (BLOOD PRESSURE) < 130/80: ICD-10-CM

## 2018-12-31 RX ORDER — HYDROCHLOROTHIAZIDE 25 MG/1
TABLET ORAL
Qty: 90 TABLET | Refills: 2 | Status: SHIPPED | OUTPATIENT
Start: 2018-12-31 | End: 2019-08-13

## 2018-12-31 RX ORDER — LISINOPRIL 40 MG/1
TABLET ORAL
Qty: 90 TABLET | Refills: 2 | Status: SHIPPED | OUTPATIENT
Start: 2018-12-31 | End: 2019-08-13

## 2018-12-31 NOTE — TELEPHONE ENCOUNTER
"Last Written Prescription Date:  Lisinopril 3/22/18  Last Fill Quantity: 90,  # refills: 2   Last office visit: No previous visit found with prescribing provider:  8/13/18   Future Office Visit:    Last Written Prescription Date:  hydrochlorothiazide 4/4/18  Last Fill Quantity: 90,  # refills: 2   Last office visit: No previous visit found with prescribing provider:  SATAY   Future Office Visit:    Requested Prescriptions   Pending Prescriptions Disp Refills     hydrochlorothiazide (HYDRODIURIL) 25 MG tablet [Pharmacy Med Name: HYDROCHLOROT TAB 25MG] 90 tablet 2     Sig: TAKE 1 TABLET EVERY MORNING    Diuretics (Including Combos) Protocol Passed - 12/29/2018  4:34 AM       Passed - Blood pressure under 140/90 in past 12 months    BP Readings from Last 3 Encounters:   09/28/18 118/72   08/13/18 109/67   06/08/18 109/72                Passed - Recent (12 mo) or future (30 days) visit within the authorizing provider's specialty    Patient had office visit in the last 12 months or has a visit in the next 30 days with authorizing provider or within the authorizing provider's specialty.  See \"Patient Info\" tab in inbasket, or \"Choose Columns\" in Meds & Orders section of the refill encounter.             Passed - Patient is age 18 or older       Passed - No active pregancy on record       Passed - Normal serum creatinine on file in past 12 months    Recent Labs   Lab Test 07/30/18  0753   CR 0.84             Passed - Normal serum potassium on file in past 12 months    Recent Labs   Lab Test 07/30/18  0753   POTASSIUM 3.6                   Passed - Normal serum sodium on file in past 12 months    Recent Labs   Lab Test 07/30/18  0753                Passed - No positive pregnancy test in past 12 months        lisinopril (PRINIVIL/ZESTRIL) 40 MG tablet [Pharmacy Med Name: LISINOPRIL TAB 40MG] 90 tablet 2     Sig: TAKE 1 TABLET DAILY    ACE Inhibitors (Including Combos) Protocol Passed - 12/29/2018  4:34 AM       Passed - " "Blood pressure under 140/90 in past 12 months    BP Readings from Last 3 Encounters:   09/28/18 118/72   08/13/18 109/67   06/08/18 109/72                Passed - Recent (12 mo) or future (30 days) visit within the authorizing provider's specialty    Patient had office visit in the last 12 months or has a visit in the next 30 days with authorizing provider or within the authorizing provider's specialty.  See \"Patient Info\" tab in inbasket, or \"Choose Columns\" in Meds & Orders section of the refill encounter.             Passed - Patient is age 18 or older       Passed - No active pregnancy on record       Passed - Normal serum creatinine on file in past 12 months    Recent Labs   Lab Test 07/30/18  0753   CR 0.84            Passed - Normal serum potassium on file in past 12 months    Recent Labs   Lab Test 07/30/18  0753   POTASSIUM 3.6            Passed - No positive pregnancy test in past 12 months        Prescription approved per [facility] Refill Protocol.   Mary Herr, RN, BSN    "

## 2019-01-14 ENCOUNTER — OFFICE VISIT (OUTPATIENT)
Dept: PODIATRY | Facility: CLINIC | Age: 54
End: 2019-01-14
Payer: COMMERCIAL

## 2019-01-14 VITALS
DIASTOLIC BLOOD PRESSURE: 70 MMHG | BODY MASS INDEX: 21.51 KG/M2 | WEIGHT: 126 LBS | SYSTOLIC BLOOD PRESSURE: 110 MMHG | HEIGHT: 64 IN

## 2019-01-14 DIAGNOSIS — L60.0 INGROWN NAIL OF GREAT TOE OF RIGHT FOOT: ICD-10-CM

## 2019-01-14 DIAGNOSIS — M79.671 RIGHT FOOT PAIN: Primary | ICD-10-CM

## 2019-01-14 PROCEDURE — 11750 EXCISION NAIL&NAIL MATRIX: CPT | Mod: T5 | Performed by: PODIATRIST

## 2019-01-14 RX ORDER — SILVER SULFADIAZINE 10 MG/G
CREAM TOPICAL 2 TIMES DAILY
Qty: 25 G | Refills: 1 | Status: SHIPPED | OUTPATIENT
Start: 2019-01-14 | End: 2019-11-06

## 2019-01-14 ASSESSMENT — MIFFLIN-ST. JEOR: SCORE: 1171.53

## 2019-01-14 NOTE — PATIENT INSTRUCTIONS
Thank you for choosing [facility]!    DR[facility] SCHEDULE  MONDAY AM - [place] TUESDAY - [place]   [address] [address] [address] [address] [address] [place] [address]   [phone] / [phone] / [phone]       WEDNESDAY - [place] FRIDAY AM - [facility]   [address]   [place] [address] [place] [address]   [phone] / [phone]       FRIDAY PM - [place] SCHEDULE SURGERY: [phone]   [address] BILLING QUESTIONS: [phone]   [place] [address] AFTER HOURS: [phone] / [phone] APPOINTMENTS: [phone]     Consumer Price Line (CPL) [phone]       INGROWN TOENAILS  When a toenail is ingrown, it is curved and grows into the skin, usually at the nail borders (the sides of the nail). This  digging in  of the nail irritates the skin, often creating pain, redness, swelling, and warmth in the toe.  If an ingrown nail causes a break in the skin, bacteria may enter and cause an infection in the area, which is often marked by drainage and a foul odor. However, even if the toe isn t painful, red, swollen, or warm, a nail that curves downward into the skin can progress to an infection.  CAUSES:  Heredity: In many people, the tendency for ingrown toenails is inherited.   Trauma: Sometimes an ingrown toenail is the result of trauma, such as stubbing your toe, having an object fall on your toe, or engaging in activities that involve repeated pressure on the toes, such as kicking or running.   Improper Trimming:  The most common cause of ingrown toenails is cutting your nails too short. This encourages the skin next to the nail to fold over the nail.   Improperly Sized Footwear: Ingrown toenails can result from wearing socks and shoes that are tight or short.   Nail Conditions: Ingrown toenails can be caused by nail problems, such as fungal  infections or losing a nail due to trauma.   TREATMENT: Sometimes initial treatment for ingrown toenails can be safely performed at home. However, home treatment is strongly discouraged if an infection is suspected, or for those who have medical conditions that put feet at high risk, such as diabetes, nerve damage in the foot, or poor circulation.  Home care: If you don t have an infection or any of the above medical conditions, you can soak your foot in room-temperature water (adding Epsom s salt may be recommended by your doctor), and gently massage the side of the nail fold to help reduce the inflammation.  Avoid attempting  bathroom surgery.  Repeated cutting of the nail can cause the condition to worsen over time. If your symptoms fail to improve, it s time to see a foot and ankle surgeon.  Physician care: After examining the toe, the foot and ankle surgeon will select the treatment best suited for you. If an infection is present, an oral antibiotic may be prescribed.  Sometimes a minor surgical procedure, often performed in the office, will ease the pain and remove the offending nail. After applying a local anesthetic, the doctor removes part of the nail s side border. Some nails may become ingrown again, requiring removal of the nail root.  Following the nail procedure, a light bandage will be applied. Most people experience very little pain after surgery and may resume normal activity the next day. If your surgeon has prescribed an oral antibiotic, be sure to take all the medication, even if your symptoms have improved.  PREVENTION:  Proper Trimming: Cut toenails in a fairly straight line, and don t cut them too short. You should be able to get your fingernail under the sides and end of the nail.   Well-fitting Footwear: Don t wear shoes that are short or tight in the toe area. Avoid shoes that are loose, because they too cause pressure on the toes, especially when running or walking briskly.     INGROWN  TOENAIL REMOVAL AFTERCARE     Go directly home and elevate the affected foot on one or two pillows for the remainder of the day/evening if possible. Your toe may stay numb anywhere from 2-8 hours.     Take Tylenol, ibuprofen or another anti-inflammatory as needed for pain.     Take antibiotic if that has been prescribed. Finish the entire prescribed antibiotic even if your symptoms have improved.     The evening of the procedure, soak/wash the affected area in warm water (you may add Epsom salt) for 5 to 10 minutes. Do this twice a day for 2-4 weeks (6-8 weeks if you had phenol) (you may count showering/bathing as one soak).  After soaks, pat the area dry and then allow to airdry for a few minutes. Apply antibiotic ointment to the area and cover with 2 X 2 gauze and paper tape or band-aid.    You may pursue everyday activities as tolerated with either an open toe shoe or cut-out shoe as needed or you may wear regular shoes if no pain is noted.    Watch for any signs and symptoms of infection such as: redness, red streaks going up the foot/leg, swelling, pus or foul odor. Those that have had the phenol procedure, the toe will drain longer and will look like it is infected because it is a chemical burn.     Please call with questions.      Body Mass Index (BMI)  Many things can cause foot and ankle problems. Foot structure, activity level, foot mechanics and injuries are common causes of pain.  One very important issue that often goes unmentioned, is body weight. Extra weight can cause increased stress on muscles, ligaments, bones and tendons.  Sometimes just a few extra pounds is all it takes to put one over her/his threshold. Without reducing that stress, it can be difficult to alleviate pain. Some people are uncomfortable addressing this issue, but we feel it is important for you to think about it. As Foot &  Ankle specialists, our job is addressing the lower extremity problem and possible causes. Regarding extra  body weight, we encourage patients to discuss diet and weight management plans with their primary care doctors. It is this team approach that gives you the best opportunity for pain relief and getting you back on your feet.

## 2019-01-14 NOTE — PROGRESS NOTES
"Podiatry / Foot and Ankle Surgery Progress Note    January 14, 2019    Subject: Patient was seen for follow up on pain to right great toe. Notes it started again a few weeks ago after skiing. Hard to put shoes on due to pain. It is 7/10. Denies injury, fever, chills. Wondering what else can be done for it.     Objective:  Vitals: /70   Ht 1.626 m (5' 4\")   Wt 57.2 kg (126 lb)   BMI 21.63 kg/m    BMI= Body mass index is 21.63 kg/m .    Dermatologic: medial border of right great toenail is incurvated. Localized redness and pain on palpation.       Vascular: DP & PT pulses are intact & regular bilaterally.  No significant edema or varicosities noted.  CFT and skin temperature is normal to both lower extremities.      Neurologic: Lower extremity sensation is intact to light touch.  No evidence of weakness or contracture in the lower extremities.  No evidence of neuropathy.      Musculoskeletal: Patient is ambulatory without assistive device or brace.  No gross ankle deformity noted.  No foot or ankle joint effusion is noted.     Radiographs:  Independently read. Do not see fracture of distal phalanx.       ASSESSMENT: right great toenail and nailbed injury.      PLAN:  Reviewed patient's chart in [facility]. Reviewed and discussed xrays. Recommend removal of the partially torn nail. She declined post op shoes. She will soak the foot twice a day for 6 weeks and apply antibiotic cream and bandaids.  Was given an order for silvadene cream.       Procedure: After verbal consent, the right big toe was anesthetized with 5cc's of 1% lidocaine plain. A tourniquet was applied to the toe. The medial border was then raised from the nail bed and then cut the length of the nail.  The offending nail border was then removed.  Three 30 second applications of phenol were applied to the nail bed and nail matrix.  The area was then flushed with copious amounts of alcohol.  Bacitracin was applied to the nail bed.  The tourniquet was " removed.  Bandage was applied to the toe.  The patient tolerated the procedure and anesthesia well.      Sanjana Madison DPM, Podiatry/Foot and Ankle Surgery

## 2019-01-14 NOTE — LETTER
"    1/14/2019         RE: Paty Johnson  [address]  [place] [address]        Dear Colleague,    Thank you for referring your patient, Paty Johnson, to the [facility]. Please see a copy of my visit note below.    Podiatry / Foot and Ankle Surgery Progress Note    January 14, 2019    Subject: Patient was seen for follow up on pain to right great toe. Notes it started again a few weeks ago after skiing. Hard to put shoes on due to pain. It is 7/10. Denies injury, fever, chills. Wondering what else can be done for it.     Objective:  Vitals: /70   Ht 1.626 m (5' 4\")   Wt 57.2 kg (126 lb)   BMI 21.63 kg/m     BMI= Body mass index is 21.63 kg/m .    Dermatologic: medial border of right great toenail is incurvated. Localized redness and pain on palpation.       Vascular: DP & PT pulses are intact & regular bilaterally.  No significant edema or varicosities noted.  CFT and skin temperature is normal to both lower extremities.      Neurologic: Lower extremity sensation is intact to light touch.  No evidence of weakness or contracture in the lower extremities.  No evidence of neuropathy.      Musculoskeletal: Patient is ambulatory without assistive device or brace.  No gross ankle deformity noted.  No foot or ankle joint effusion is noted.     Radiographs:  Independently read. Do not see fracture of distal phalanx.       ASSESSMENT: right great toenail and nailbed injury.      PLAN:  Reviewed patient's chart in [facility]. Reviewed and discussed xrays. Recommend removal of the partially torn nail. She declined post op shoes. She will soak the foot twice a day for 6 weeks and apply antibiotic cream and bandaids.  Was given an order for silvadene cream.       Procedure: After verbal consent, the right big toe was anesthetized with 5cc's of 1% lidocaine plain. A tourniquet was applied to the toe. The medial border was then raised from the nail bed and then cut the length of the " nail.  The offending nail border was then removed.  Three 30 second applications of phenol were applied to the nail bed and nail matrix.  The area was then flushed with copious amounts of alcohol.  Bacitracin was applied to the nail bed.  The tourniquet was removed.  Bandage was applied to the toe.  The patient tolerated the procedure and anesthesia well.      Sanjana Madison DPM, Podiatry/Foot and Ankle Surgery        Again, thank you for allowing me to participate in the care of your patient.        Sincerely,        Sanjana Madison DPM, Podiatry/Foot and Ankle Surgery

## 2019-02-26 ENCOUNTER — ANCILLARY PROCEDURE (OUTPATIENT)
Dept: GENERAL RADIOLOGY | Facility: CLINIC | Age: 54
End: 2019-02-26
Attending: FAMILY MEDICINE
Payer: COMMERCIAL

## 2019-02-26 ENCOUNTER — OFFICE VISIT (OUTPATIENT)
Dept: FAMILY MEDICINE | Facility: CLINIC | Age: 54
End: 2019-02-26
Payer: COMMERCIAL

## 2019-02-26 VITALS
TEMPERATURE: 99.3 F | HEART RATE: 88 BPM | WEIGHT: 130.1 LBS | BODY MASS INDEX: 22.33 KG/M2 | OXYGEN SATURATION: 98 % | SYSTOLIC BLOOD PRESSURE: 108 MMHG | RESPIRATION RATE: 22 BRPM | DIASTOLIC BLOOD PRESSURE: 72 MMHG

## 2019-02-26 DIAGNOSIS — R05.9 COUGH: ICD-10-CM

## 2019-02-26 DIAGNOSIS — J10.1 INFLUENZA A: Primary | ICD-10-CM

## 2019-02-26 LAB
FLUAV+FLUBV AG SPEC QL: NEGATIVE
FLUAV+FLUBV AG SPEC QL: POSITIVE
SPECIMEN SOURCE: ABNORMAL

## 2019-02-26 PROCEDURE — 87804 INFLUENZA ASSAY W/OPTIC: CPT | Performed by: FAMILY MEDICINE

## 2019-02-26 PROCEDURE — 71046 X-RAY EXAM CHEST 2 VIEWS: CPT | Mod: FY

## 2019-02-26 PROCEDURE — 99214 OFFICE O/P EST MOD 30 MIN: CPT | Performed by: FAMILY MEDICINE

## 2019-02-26 RX ORDER — CODEINE PHOSPHATE AND GUAIFENESIN 10; 100 MG/5ML; MG/5ML
2 SOLUTION ORAL EVERY 4 HOURS PRN
Qty: 120 ML | Refills: 1 | Status: SHIPPED | OUTPATIENT
Start: 2019-02-26 | End: 2019-11-06

## 2019-02-26 ASSESSMENT — ENCOUNTER SYMPTOMS
SHORTNESS OF BREATH: 1
SORE THROAT: 1
WHEEZING: 0
SPUTUM PRODUCTION: 1
CARDIOVASCULAR NEGATIVE: 1
HEADACHES: 1
CONSTIPATION: 0
ABDOMINAL PAIN: 0
CHILLS: 1
NAUSEA: 0
VOMITING: 0
FEVER: 0
DIARRHEA: 0
COUGH: 1

## 2019-02-26 NOTE — PROGRESS NOTES
SUBJECTIVE:   Paty Johnson is a 53 year old female who presents to clinic today for the following health issues:    Acute Illness   Acute illness concerns: URI  Onset: x3 days    Fever: YES    Chills/Sweats: YES    Headache (location?): YES- frontal, right temple    Sinus Pressure:YES    Conjunctivitis:  YES: both    Ear Pain: YES: right    Rhinorrhea: YES    Congestion: no    Sore Throat: YES     Cough: YES-productive of yellow sputum    Wheeze: no     Decreased Appetite: YES    Nausea: YES    Vomiting: no    Diarrhea:  YES    Dysuria/Freq.: no    Fatigue/Achiness: YES    Sick/Strep Exposure: no     Therapies Tried and outcome: OTC cough medicine, ibuprofen, acetaminophen, gargling with warm salt water, sleep and rest    Temp earlier today was 101.3.  Having frequent sweats, chills.  Does feel like she gets short of breath easily.  Has not had a flu shot this year.  Has mostly been using OTC cold medications.

## 2019-02-26 NOTE — PROGRESS NOTES
HPI    SUBJECTIVE:   Paty Johnson is a 53 year old female who presents to clinic today for the following health issues:    Acute Illness   Acute illness concerns: URI  Onset: x3 days    Fever: YES    Chills/Sweats: YES    Headache (location?): YES- frontal, right temple    Sinus Pressure:YES    Conjunctivitis:  YES: both    Ear Pain: YES: right    Rhinorrhea: YES    Congestion: no    Sore Throat: YES     Cough: YES-productive of yellow sputum    Wheeze: no     Decreased Appetite: YES    Nausea: YES    Vomiting: no    Diarrhea:  YES    Dysuria/Freq.: no    Fatigue/Achiness: YES    Sick/Strep Exposure: no     Therapies Tried and outcome: OTC cough medicine, ibuprofen, acetaminophen, gargling with warm salt water, sleep and rest    Temp earlier today was 101.3.  Having frequent sweats, chills.  Does feel like she gets short of breath easily.  Has not had a flu shot this year.  Has mostly been using OTC cold medications.    Review of Systems   Constitutional: Positive for chills and malaise/fatigue. Negative for fever.   HENT: Positive for congestion and sore throat.    Respiratory: Positive for cough, sputum production and shortness of breath. Negative for wheezing.    Cardiovascular: Negative.    Gastrointestinal: Negative for abdominal pain, constipation, diarrhea, nausea and vomiting.   Skin: Negative for rash.   Neurological: Positive for headaches.         Physical Exam   Constitutional: No distress.   HENT:   Right Ear: Tympanic membrane, external ear and ear canal normal.   Left Ear: Tympanic membrane, external ear and ear canal normal.   Mouth/Throat: Oropharynx is clear and moist. No oropharyngeal exudate.   Eyes: Conjunctivae are normal.   Cardiovascular: Normal rate, regular rhythm and normal heart sounds.   Pulmonary/Chest: Effort normal. She has wheezes. She has no rales.   Lymphadenopathy:     She has no cervical adenopathy.   Skin: Skin is warm and dry. No rash noted.   Nursing note and vitals  reviewed.    (J10.1) Influenza A  (primary encounter diagnosis)  Comment: has been too long to start tamiflu, will focus on sx cares.  Advised that cough may linger, but not to worry too much if she is feeling better  Plan: guaiFENesin-codeine (ROBITUSSIN AC) 100-10         MG/5ML solution            (R05) Cough  Comment:   Plan: Influenza A/B antigen, XR Chest 2 Views              RTC in 1m prn    Fredo Ward MD

## 2019-04-01 ENCOUNTER — TELEPHONE (OUTPATIENT)
Dept: FAMILY MEDICINE | Facility: CLINIC | Age: 54
End: 2019-04-01

## 2019-04-01 DIAGNOSIS — R91.8 PULMONARY NODULES: Primary | ICD-10-CM

## 2019-04-01 NOTE — TELEPHONE ENCOUNTER
Patient calling stating she received her results of her chest x-ray done 2/26/2019 through [facility] and it says to follow up with a CT scan.  Wondering if this has been addressed?  I do not see that this has.  Please advise.    IMPRESSION: New nodular opacities at the bilateral lung bases. On the  left side this appears somewhat irregular. On the right side this is  in the subpleural position. Recommend CT chest for further assessment.  Normal cardiac silhouette.    Bella Darden RN

## 2019-04-03 ENCOUNTER — HOSPITAL ENCOUNTER (OUTPATIENT)
Dept: CT IMAGING | Facility: CLINIC | Age: 54
Discharge: HOME | End: 2019-04-03
Attending: FAMILY MEDICINE | Admitting: FAMILY MEDICINE
Payer: COMMERCIAL

## 2019-04-03 DIAGNOSIS — R91.8 PULMONARY NODULES: ICD-10-CM

## 2019-04-03 PROCEDURE — 71250 CT THORAX DX C-: CPT

## 2019-04-17 DIAGNOSIS — E78.5 HYPERLIPIDEMIA LDL GOAL <130: ICD-10-CM

## 2019-04-17 DIAGNOSIS — Z00.00 ROUTINE GENERAL MEDICAL EXAMINATION AT A HEALTH CARE FACILITY: ICD-10-CM

## 2019-04-17 DIAGNOSIS — R94.6 BORDERLINE ABNORMAL TFTS: ICD-10-CM

## 2019-04-17 LAB
BASOPHILS # BLD AUTO: 0 10E9/L (ref 0–0.2)
BASOPHILS NFR BLD AUTO: 0.2 %
DIFFERENTIAL METHOD BLD: NORMAL
EOSINOPHIL # BLD AUTO: 0.1 10E9/L (ref 0–0.7)
EOSINOPHIL NFR BLD AUTO: 1.9 %
ERYTHROCYTE [DISTWIDTH] IN BLOOD BY AUTOMATED COUNT: 13.3 % (ref 10–15)
HBA1C MFR BLD: 4.8 % (ref 0–5.6)
HCT VFR BLD AUTO: 43.3 % (ref 35–47)
HGB BLD-MCNC: 15 G/DL (ref 11.7–15.7)
LYMPHOCYTES # BLD AUTO: 1.4 10E9/L (ref 0.8–5.3)
LYMPHOCYTES NFR BLD AUTO: 33.9 %
MCH RBC QN AUTO: 31.3 PG (ref 26.5–33)
MCHC RBC AUTO-ENTMCNC: 34.6 G/DL (ref 31.5–36.5)
MCV RBC AUTO: 90 FL (ref 78–100)
MONOCYTES # BLD AUTO: 0.5 10E9/L (ref 0–1.3)
MONOCYTES NFR BLD AUTO: 12 %
NEUTROPHILS # BLD AUTO: 2.2 10E9/L (ref 1.6–8.3)
NEUTROPHILS NFR BLD AUTO: 52 %
PLATELET # BLD AUTO: 284 10E9/L (ref 150–450)
RBC # BLD AUTO: 4.79 10E12/L (ref 3.8–5.2)
T3FREE SERPL-MCNC: 3 PG/ML (ref 2.3–4.2)
WBC # BLD AUTO: 4.2 10E9/L (ref 4–11)

## 2019-04-17 PROCEDURE — 84439 ASSAY OF FREE THYROXINE: CPT | Performed by: INTERNAL MEDICINE

## 2019-04-17 PROCEDURE — 83036 HEMOGLOBIN GLYCOSYLATED A1C: CPT | Performed by: INTERNAL MEDICINE

## 2019-04-17 PROCEDURE — 80061 LIPID PANEL: CPT | Mod: 59 | Performed by: INTERNAL MEDICINE

## 2019-04-17 PROCEDURE — 83704 LIPOPROTEIN BLD QUAN PART: CPT | Mod: 90 | Performed by: INTERNAL MEDICINE

## 2019-04-17 PROCEDURE — 36415 COLL VENOUS BLD VENIPUNCTURE: CPT | Performed by: INTERNAL MEDICINE

## 2019-04-17 PROCEDURE — 84443 ASSAY THYROID STIM HORMONE: CPT | Performed by: INTERNAL MEDICINE

## 2019-04-17 PROCEDURE — 85025 COMPLETE CBC W/AUTO DIFF WBC: CPT | Performed by: INTERNAL MEDICINE

## 2019-04-17 PROCEDURE — 80048 BASIC METABOLIC PNL TOTAL CA: CPT | Performed by: INTERNAL MEDICINE

## 2019-04-17 PROCEDURE — 99000 SPECIMEN HANDLING OFFICE-LAB: CPT | Performed by: INTERNAL MEDICINE

## 2019-04-17 PROCEDURE — 84481 FREE ASSAY (FT-3): CPT | Performed by: INTERNAL MEDICINE

## 2019-04-18 LAB
ANION GAP SERPL CALCULATED.3IONS-SCNC: 8 MMOL/L (ref 3–14)
BUN SERPL-MCNC: 19 MG/DL (ref 7–30)
CALCIUM SERPL-MCNC: 9.3 MG/DL (ref 8.5–10.1)
CHLORIDE SERPL-SCNC: 105 MMOL/L (ref 94–109)
CO2 SERPL-SCNC: 25 MMOL/L (ref 20–32)
CREAT SERPL-MCNC: 0.79 MG/DL (ref 0.52–1.04)
GFR SERPL CREATININE-BSD FRML MDRD: 86 ML/MIN/{1.73_M2}
GLUCOSE SERPL-MCNC: 88 MG/DL (ref 70–99)
POTASSIUM SERPL-SCNC: 3.8 MMOL/L (ref 3.4–5.3)
SODIUM SERPL-SCNC: 138 MMOL/L (ref 133–144)
T4 FREE SERPL-MCNC: 0.84 NG/DL (ref 0.76–1.46)
TSH SERPL DL<=0.005 MIU/L-ACNC: 6.74 MU/L (ref 0.4–4)

## 2019-04-20 LAB
CHOLEST SERPL-MCNC: 221 MG/DL
HDL SERPL QN: 9 NM
HDL SERPL-SCNC: 36.8 UMOL/L
HDLC SERPL-MCNC: 58 MG/DL (ref 40–59)
HLD.LARGE SERPL-SCNC: 6.8 UMOL/L
LDL SERPL QN: 21.3 NM
LDL SERPL-SCNC: 1477 NMOL/L
LDL SMALL SERPL-SCNC: 479 NMOL/L
LDLC SERPL CALC-MCNC: 151 MG/DL
PATHOLOGY STUDY: ABNORMAL
TRIGL SERPL-MCNC: 59 MG/DL (ref 30–149)
VLDL LARGE SERPL-SCNC: 1.5 NMOL/L
VLDL SERPL QN: 43.9 NM

## 2019-05-13 ENCOUNTER — TELEPHONE (OUTPATIENT)
Dept: OTHER | Facility: CLINIC | Age: 54
End: 2019-05-13

## 2019-05-13 NOTE — TELEPHONE ENCOUNTER
Paty does not need any orders for yearly appointment.  She has labs that were completed on 4/17/19 that need to be discussed at the upcoming visit.    Jo MONTESINOSN, RN

## 2019-05-13 NOTE — TELEPHONE ENCOUNTER
Patient's  (Mac) is calling to schedule her yearly appointment with Dr Berg - they like to schedule them back to back.  I will need orders to schedule  Yin's yearly appointment.

## 2019-06-12 ENCOUNTER — ALLIED HEALTH/NURSE VISIT (OUTPATIENT)
Dept: NURSING | Facility: CLINIC | Age: 54
End: 2019-06-12
Payer: COMMERCIAL

## 2019-06-12 DIAGNOSIS — Z23 NEED FOR SHINGLES VACCINE: Primary | ICD-10-CM

## 2019-06-12 PROCEDURE — 90750 HZV VACC RECOMBINANT IM: CPT

## 2019-06-12 PROCEDURE — 99207 ZZC NO CHARGE NURSE ONLY: CPT

## 2019-06-12 PROCEDURE — 90471 IMMUNIZATION ADMIN: CPT

## 2019-06-12 NOTE — NURSING NOTE
Screening Questionnaire for Adult Immunization    Are you sick today?   No   Do you have allergies to medications, food, a vaccine component or latex?   No   Have you ever had a serious reaction after receiving a vaccination?   No   Do you have a long-term health problem with heart disease, lung disease, asthma, kidney disease, metabolic disease (e.g. diabetes), anemia, or other blood disorder?   No   Do you have cancer, leukemia, HIV/AIDS, or any other immune system problem?   No   In the past 3 months, have you taken medications that affect  your immune system, such as prednisone, other steroids, or anticancer drugs; drugs for the treatment of rheumatoid arthritis, Crohn s disease, or psoriasis; or have you had radiation treatments?   No   Have you had a seizure, or a brain or other nervous system problem?   No   During the past year, have you received a transfusion of blood or blood     products, or been given immune (gamma) globulin or antiviral drug?   No   For women: Are you pregnant or is there a chance you could become        pregnant during the next month?   No   Have you received any vaccinations in the past 4 weeks?   No     Immunization questionnaire answers were all negative.        Per orders of VALERIE Leung, injection of Shingrix given by Philippe Burnett. Patient instructed to remain in clinic for 15 minutes afterwards, and to report any adverse reaction to me immediately.       Screening performed by Philippe Burnett on 6/12/2019 at 11:24 AM.

## 2019-08-06 ENCOUNTER — TELEPHONE (OUTPATIENT)
Dept: FAMILY MEDICINE | Facility: CLINIC | Age: 54
End: 2019-08-06

## 2019-08-06 NOTE — TELEPHONE ENCOUNTER
Panel Management Review      Patient has the following on her problem list:     Hypertension   Last three blood pressure readings:  BP Readings from Last 3 Encounters:   02/26/19 108/72   01/14/19 110/70   09/28/18 118/72     Blood pressure: Passed    HTN Guidelines:  Less than 140/90      Composite cancer screening  Chart review shows that this patient is due/due soon for the following Pap Smear and Mammogram  Summary:    Patient is due/failing the following:   MAMMOGRAM, PAP and PHYSICAL    Action needed:   Patient needs office visit for physical with pap and schedule mammogram. Also due for 2nd shingles vaccine and twinrix if interested    Type of outreach:    Phone, left message for patient to call back.     Questions for provider review:    None                                                                                                                                    Isi Gaffney       Chart routed to Care Team .

## 2019-08-13 ENCOUNTER — OFFICE VISIT (OUTPATIENT)
Dept: OTHER | Facility: CLINIC | Age: 54
End: 2019-08-13
Attending: INTERNAL MEDICINE
Payer: COMMERCIAL

## 2019-08-13 VITALS
HEIGHT: 64 IN | OXYGEN SATURATION: 95 % | RESPIRATION RATE: 14 BRPM | HEART RATE: 63 BPM | BODY MASS INDEX: 21.68 KG/M2 | WEIGHT: 127 LBS | SYSTOLIC BLOOD PRESSURE: 109 MMHG | DIASTOLIC BLOOD PRESSURE: 71 MMHG

## 2019-08-13 DIAGNOSIS — I10 HYPERTENSION GOAL BP (BLOOD PRESSURE) < 130/80: ICD-10-CM

## 2019-08-13 DIAGNOSIS — R94.6 BORDERLINE ABNORMAL TFTS: ICD-10-CM

## 2019-08-13 DIAGNOSIS — Z00.00 ROUTINE GENERAL MEDICAL EXAMINATION AT A HEALTH CARE FACILITY: Primary | ICD-10-CM

## 2019-08-13 DIAGNOSIS — E78.5 HYPERLIPIDEMIA LDL GOAL <130: ICD-10-CM

## 2019-08-13 PROCEDURE — 99396 PREV VISIT EST AGE 40-64: CPT | Mod: ZP | Performed by: INTERNAL MEDICINE

## 2019-08-13 PROCEDURE — G0463 HOSPITAL OUTPT CLINIC VISIT: HCPCS

## 2019-08-13 PROCEDURE — 99213 OFFICE O/P EST LOW 20 MIN: CPT | Mod: 25 | Performed by: INTERNAL MEDICINE

## 2019-08-13 RX ORDER — HYDROCHLOROTHIAZIDE 25 MG/1
TABLET ORAL
Qty: 90 TABLET | Refills: 3 | Status: SHIPPED | OUTPATIENT
Start: 2019-08-13 | End: 2019-11-06

## 2019-08-13 RX ORDER — LISINOPRIL 40 MG/1
40 TABLET ORAL DAILY
Qty: 90 TABLET | Refills: 3 | Status: SHIPPED | OUTPATIENT
Start: 2019-08-13 | End: 2020-02-12

## 2019-08-13 RX ORDER — ROSUVASTATIN CALCIUM 20 MG/1
20 TABLET, COATED ORAL DAILY
Qty: 90 TABLET | Refills: 3 | Status: SHIPPED | OUTPATIENT
Start: 2019-08-13 | End: 2020-02-12 | Stop reason: DRUGHIGH

## 2019-08-13 ASSESSMENT — MIFFLIN-ST. JEOR: SCORE: 1171.07

## 2019-08-13 NOTE — PROGRESS NOTES
SUBJECTIVE:    CC: Paty Johnson is a 54 year old female who presents for:       Routine general medical examination at a health care facility  Hypertension goal BP (blood pressure) < 130/80  Hyperlipidemia LDL goal <130  Borderline abnormal TFTs        PAST MEDICAL HISTORY:                  Past Medical History:   Diagnosis Date     Carcinoma in situ of cervix uteri 1993    treated with Effudex for 10 weeks     Irregular menstrual cycle     no infertility eval or problems     Unspecified essential hypertension 2007       PAST SURGICAL HISTORY:                  Past Surgical History:   Procedure Laterality Date     C NONSPECIFIC PROCEDURE  1993    laser ablation for cervix       CURRENT MEDICATIONS:                  Current Outpatient Medications   Medication Sig Dispense Refill     amoxicillin-clavulanate (AUGMENTIN) 875-125 MG per tablet Take 1 tablet by mouth 2 times daily 20 tablet 0     benzonatate (TESSALON) 200 MG capsule Take 1 capsule (200 mg) by mouth 3 times daily as needed for cough 21 capsule 0     guaiFENesin-codeine (ROBITUSSIN AC) 100-10 MG/5ML SOLN solution Take 10 mLs by mouth nightly as needed for cough 120 mL 0     guaiFENesin-codeine (ROBITUSSIN AC) 100-10 MG/5ML solution Take 10 mLs by mouth every 4 hours as needed for cough 120 mL 1     hydrochlorothiazide (HYDRODIURIL) 25 MG tablet TAKE 1 TABLET EVERY MORNING 90 tablet 3     lisinopril (PRINIVIL/ZESTRIL) 40 MG tablet Take 1 tablet (40 mg) by mouth daily 90 tablet 3     rosuvastatin (CRESTOR) 20 MG tablet Take 1 tablet (20 mg) by mouth daily 90 tablet 3     silver sulfADIAZINE (SILVADENE) 1 % external cream Apply topically 2 times daily Apply to right great toe 2x a day. 25 g 1       ALLERGIES:                  Allergies   Allergen Reactions     Nkda [No Known Drug Allergies]        SOCIAL HISTORY:                  Social History     Socioeconomic History     Marital status:      Spouse name: Mac     Number of children: 3      Years of education: 16     Highest education level: Not on file   Occupational History     Occupation:      Employer: NONE      Employer: HOMEMAKER   Social Needs     Financial resource strain: Not on file     Food insecurity:     Worry: Not on file     Inability: Not on file     Transportation needs:     Medical: Not on file     Non-medical: Not on file   Tobacco Use     Smoking status: Never Smoker     Smokeless tobacco: Never Used   Substance and Sexual Activity     Alcohol use: Yes     Comment: 1-2 drinks per week     Drug use: No     Sexual activity: Yes     Partners: Male     Birth control/protection: Surgical     Comment:  had a vasectomy    Lifestyle     Physical activity:     Days per week: Not on file     Minutes per session: Not on file     Stress: Not on file   Relationships     Social connections:     Talks on phone: Not on file     Gets together: Not on file     Attends Taoism service: Not on file     Active member of club or organization: Not on file     Attends meetings of clubs or organizations: Not on file     Relationship status: Not on file     Intimate partner violence:     Fear of current or ex partner: Not on file     Emotionally abused: Not on file     Physically abused: Not on file     Forced sexual activity: Not on file   Other Topics Concern      Service Not Asked     Blood Transfusions Not Asked     Caffeine Concern Not Asked     Occupational Exposure Not Asked     Hobby Hazards Not Asked     Sleep Concern Not Asked     Stress Concern Not Asked     Weight Concern Not Asked     Special Diet Not Asked     Back Care Not Asked     Exercise Yes     Bike Helmet Not Asked     Seat Belt Yes     Self-Exams Not Asked     Parent/sibling w/ CABG, MI or angioplasty before 65F 55M? No   Social History Narrative     Not on file       FAMILY HISTORY:                   Family History   Problem Relation Age of Onset     Arthritis Sister         also has lupus     Respiratory  "Maternal Grandfather      Breast Cancer Paternal Grandmother      Cancer - colorectal Paternal Grandfather      Endocrine Disease Mother         prediabetes, neuropathy, diet controlled     Hypertension Father         takes meds     C.A.D. No family hx of      Cerebrovascular Disease No family hx of      Diabetes No family hx of              CONSTITUTIONAL: no malaise, fatigue, or other general symptoms  EYES: no subjective changes in visual acuity, no photophobia  ENT/MOUTH: no complaints of rhinorrhea, nasal congestion, sore throat, hearing changes  RESP: no SOB  CV: no c/o exertional chest pressure or QUIROZ  GI: No abdominal pain, constipation, change in bowel movements, nausea, pyrosis, BRBPR  : no polyuria or polydipsia, no dysuria, no gross hematuria  MUSCULOSKELETAL: no arthalgias or myalgias  INTEGUMENTARY/SKIN: no pruritis, rashes, or moles with recent change in size, shape, or pigmentation  BREAST: no lumps, masses, or discharges  NEURO: no gross sensory or motor symptoms, no dizziness, no confusion  ENDOCRINE: no polyuria or polydipsia, no heat or cold intolerance  HEME/ALLERGY/IMMUNE: no fevers, chills, night sweats, or unwanted weight loss  PSYCHIATRIC: no depression, anxiety, or internal stimuli    EXAM:    /71 (BP Location: Right arm, Patient Position: Chair, Cuff Size: Adult Regular)   Pulse 63   Resp 14   Ht 5' 4\" (1.626 m)   Wt 127 lb (57.6 kg)   LMP 08/05/2019   SpO2 95%   Breastfeeding? No   BMI 21.80 kg/m    BMI: Body mass index is 21.8 kg/m .    GENERAL APPEARANCE:healthy, alert and no distress    ORGAN EXAMS:               EYES: clear conjunctiva, no cataracts, no obvious fundoscopic abnormalities               HENT: oropharynx, nares, and TMs are WNL               NECK: no JVD, thyromegaly or lymphadenopathy, no cervical bruits               RESP: clear to auscultation without rales, wheezes, or rhonchi               CV: RRR, no murmurs, gallops, or rubs               LYMPH: no " cervical , axillary, or inguinal lymphadenopathy appreciated               GI: NABS, ND/NT, no masses or organomegally appreciated               MS: no obvoius clinicallly relevant arthropathy, no evidence vasculitis               SKIN: no nevi clinically suspicious for malignancy are noted               NEURO: CN II-XII intact, no localizing sensory or motor abnoramlities noted, DTRs symmetrical bilaterally               PSYCH: Mental status exam reveals the pt to have normal mood and affect. There is no disorder of thought form or content. There is no response to internal stimuli. There is no suicidal or homicidal ideation.     ASSESSMENT/PLAN    (Z00.00) Routine general medical examination at a health care facility  (primary encounter diagnosis)  Comment: annual gyn exam needed, colonoscopy WNL  Plan: RTC one year for annual physical    (I10) Hypertension goal BP (blood pressure) < 130/80  Comment: at goal  Plan: hydrochlorothiazide (HYDRODIURIL) 25 MG tablet,        lisinopril (PRINIVIL/ZESTRIL) 40 MG tablet           (E78.5) Hyperlipidemia LDL goal <130  Comment: not at goal, start the below, RTC three months for labs  Plan: rosuvastatin (CRESTOR) 20 MG tablet, LipoFit by        NMR, CRP cardiac risk, Lipoprotein A           (R94.6) Borderline abnormal TFTs  Comment: TSH high, pt asx, other TFTs WNL, thyroid U/s and Abs WNL  Plan: annual surveillance      Greater than one half the 15 minutes not spent on preventive care during this visit was spent providing aducation and counselling regarding item(s) # 2 onward as delineated above.                        I have discussed with patient the risks, benefits, medications, treatment options and modalities.   I have instructed the patient to call or schedule a follow-up appointment if any problems or failure to improve.

## 2019-09-18 DIAGNOSIS — I10 HYPERTENSION GOAL BP (BLOOD PRESSURE) < 130/80: ICD-10-CM

## 2019-09-18 RX ORDER — LISINOPRIL 40 MG/1
TABLET ORAL
Qty: 90 TABLET | Refills: 2 | Status: SHIPPED | OUTPATIENT
Start: 2019-09-18 | End: 2019-11-06

## 2019-09-18 RX ORDER — HYDROCHLOROTHIAZIDE 25 MG/1
TABLET ORAL
Qty: 90 TABLET | Refills: 2 | Status: SHIPPED | OUTPATIENT
Start: 2019-09-18 | End: 2020-02-12

## 2019-09-18 NOTE — TELEPHONE ENCOUNTER
hydrochlorothiazide (HYDRODIURIL) 25 MG tablet  Last Written Prescription Date:  8/13/19  Last Fill Quantity: 90,  # refills: 3    lisinopril (PRINIVIL/ZESTRIL) 40 MG tablet  Last Written Prescription Date:  8/13/19  Last Fill Quantity: 90,  # refills: 3       Last office Visit: 8/13/19    Request for transfer to mail order.  Aria Sharpe RN BSN  [facility]

## 2019-11-04 ENCOUNTER — HEALTH MAINTENANCE LETTER (OUTPATIENT)
Age: 54
End: 2019-11-04

## 2019-11-05 ASSESSMENT — ENCOUNTER SYMPTOMS
ABDOMINAL PAIN: 0
CONSTIPATION: 0
HEADACHES: 0
DIZZINESS: 0
SORE THROAT: 0
WEAKNESS: 0
BREAST MASS: 0
MYALGIAS: 0
HEMATOCHEZIA: 0
COUGH: 0
DIARRHEA: 0
HEARTBURN: 0
ARTHRALGIAS: 0
JOINT SWELLING: 0
NAUSEA: 0
CHILLS: 0
EYE PAIN: 0
PARESTHESIAS: 0
FREQUENCY: 0
FEVER: 0
SHORTNESS OF BREATH: 0
DYSURIA: 0
HEMATURIA: 0
NERVOUS/ANXIOUS: 0
PALPITATIONS: 0

## 2019-11-06 ENCOUNTER — OFFICE VISIT (OUTPATIENT)
Dept: FAMILY MEDICINE | Facility: CLINIC | Age: 54
End: 2019-11-06
Payer: COMMERCIAL

## 2019-11-06 VITALS
OXYGEN SATURATION: 98 % | HEIGHT: 64 IN | TEMPERATURE: 97.6 F | WEIGHT: 135 LBS | BODY MASS INDEX: 23.05 KG/M2 | RESPIRATION RATE: 16 BRPM | DIASTOLIC BLOOD PRESSURE: 60 MMHG | SYSTOLIC BLOOD PRESSURE: 108 MMHG | HEART RATE: 65 BPM

## 2019-11-06 DIAGNOSIS — Z23 NEED FOR SHINGLES VACCINE: ICD-10-CM

## 2019-11-06 DIAGNOSIS — Z23 NEEDS FLU SHOT: ICD-10-CM

## 2019-11-06 DIAGNOSIS — M25.50 MULTIPLE JOINT PAIN: ICD-10-CM

## 2019-11-06 DIAGNOSIS — N83.209 CYST OF OVARY, UNSPECIFIED LATERALITY: ICD-10-CM

## 2019-11-06 DIAGNOSIS — Z01.419 ENCOUNTER FOR GYNECOLOGICAL EXAMINATION WITHOUT ABNORMAL FINDING: Primary | ICD-10-CM

## 2019-11-06 PROCEDURE — G0145 SCR C/V CYTO,THINLAYER,RESCR: HCPCS | Performed by: FAMILY MEDICINE

## 2019-11-06 PROCEDURE — 90472 IMMUNIZATION ADMIN EACH ADD: CPT | Performed by: FAMILY MEDICINE

## 2019-11-06 PROCEDURE — 87624 HPV HI-RISK TYP POOLED RSLT: CPT | Performed by: FAMILY MEDICINE

## 2019-11-06 PROCEDURE — 99396 PREV VISIT EST AGE 40-64: CPT | Mod: 25 | Performed by: FAMILY MEDICINE

## 2019-11-06 PROCEDURE — 90682 RIV4 VACC RECOMBINANT DNA IM: CPT | Performed by: FAMILY MEDICINE

## 2019-11-06 PROCEDURE — 90471 IMMUNIZATION ADMIN: CPT | Performed by: FAMILY MEDICINE

## 2019-11-06 PROCEDURE — 90750 HZV VACC RECOMBINANT IM: CPT | Performed by: FAMILY MEDICINE

## 2019-11-06 ASSESSMENT — ENCOUNTER SYMPTOMS
HEARTBURN: 0
CHILLS: 0
PARESTHESIAS: 0
DIARRHEA: 0
BREAST MASS: 0
JOINT SWELLING: 0
ARTHRALGIAS: 0
NERVOUS/ANXIOUS: 0
HEMATOCHEZIA: 0
SORE THROAT: 0
FREQUENCY: 0
COUGH: 0
HEMATURIA: 0
EYE PAIN: 0
DYSURIA: 0
SHORTNESS OF BREATH: 0
PALPITATIONS: 0
MYALGIAS: 0
WEAKNESS: 0
CONSTIPATION: 0
ABDOMINAL PAIN: 0
NAUSEA: 0
FEVER: 0
DIZZINESS: 0
HEADACHES: 0

## 2019-11-06 ASSESSMENT — MIFFLIN-ST. JEOR: SCORE: 1207.36

## 2019-11-06 NOTE — NURSING NOTE
Flu shot and SHINGRIX injections given.  See Immunization section for details.  Lisa Magill, CMA

## 2019-11-06 NOTE — PROGRESS NOTES
Prior to immunization administration, verified patients identity using patient s name and date of birth. Please see Immunization Activity for additional information.     Screening Questionnaire for Adult Immunization    Are you sick today?   No   Do you have allergies to medications, food, a vaccine component or latex?   No   Have you ever had a serious reaction after receiving a vaccination?   No   Do you have a long-term health problem with heart disease, lung disease, asthma, kidney disease, metabolic disease (e.g. diabetes), anemia, or other blood disorder?   No   Do you have cancer, leukemia, HIV/AIDS, or any other immune system problem?   No   In the past 3 months, have you taken medications that affect  your immune system, such as prednisone, other steroids, or anticancer drugs; drugs for the treatment of rheumatoid arthritis, Crohn s disease, or psoriasis; or have you had radiation treatments?   No   Have you had a seizure, or a brain or other nervous system problem?   No   During the past year, have you received a transfusion of blood or blood     products, or been given immune (gamma) globulin or antiviral drug?   No   For women: Are you pregnant or is there a chance you could become        pregnant during the next month?   No   Have you received any vaccinations in the past 4 weeks?   No     Immunization questionnaire answers were all negative.        Per orders of Dr. Carrasquillo, injection of SHINGRIX and Flu shot given by Lisa A. Magill, CMA. Patient instructed to remain in clinic for 15 minutes afterwards, and to report any adverse reaction to me immediately.       Screening performed by Lisa A. Magill, CMA on 11/6/2019 at 9:24 AM.

## 2019-11-06 NOTE — PROGRESS NOTES
SUBJECTIVE:   CC: Paty Johnson is an 54 year old woman who presents for preventive health visit.     Healthy Habits:     Getting at least 3 servings of Calcium per day:  Yes    Bi-annual eye exam:  Yes    Dental care twice a year:  Yes    Sleep apnea or symptoms of sleep apnea:  None    Diet:  Gluten-free/reduced    Frequency of exercise:  6-7 days/week    Duration of exercise:  45-60 minutes    Taking medications regularly:  Yes    Medication side effects:  None    PHQ-2 Total Score: 0    Additional concerns today:  No          No cocnerns     Today's PHQ-2 Score:   PHQ-2 ( 1999 Pfizer) 11/5/2019   Q1: Little interest or pleasure in doing things 0   Q2: Feeling down, depressed or hopeless 0   PHQ-2 Score 0   Q1: Little interest or pleasure in doing things Not at all   Q2: Feeling down, depressed or hopeless Not at all   PHQ-2 Score 0       Abuse: Current or Past(Physical, Sexual or Emotional)- No  Do you feel safe in your environment? Yes        Social History     Tobacco Use     Smoking status: Never Smoker     Smokeless tobacco: Never Used   Substance Use Topics     Alcohol use: Yes     Comment: 1-2 drinks per week     Big toe joint is painful    Alcohol Use 11/5/2019   Prescreen: >3 drinks/day or >7 drinks/week? No   Prescreen: >3 drinks/day or >7 drinks/week? -       Reviewed orders with patient.  Reviewed health maintenance and updated orders accordingly -   Lab work is in process    Mammogram Screening: Patient over age 50, mutual decision to screen reflected in health maintenance.    Pertinent mammograms are reviewed under the imaging tab.  History of abnormal Pap smear: NO - age 30-65 PAP every 5 years with negative HPV co-testing recommended  PAP / HPV Latest Ref Rng & Units 9/13/2016 2/25/2014 9/18/2012   PAP - NIL NIL ASC-US(A)   HPV 16 DNA NEG Negative - -   HPV 18 DNA NEG Negative - -   OTHER HR HPV NEG Negative - -     Reviewed and updated as needed this visit by clinical staff  Tobacco   "Allergies  Meds         Reviewed and updated as needed this visit by Provider            Review of Systems   Constitutional: Negative for chills and fever.   HENT: Negative for congestion, ear pain, hearing loss and sore throat.    Eyes: Negative for pain and visual disturbance.   Respiratory: Negative for cough and shortness of breath.    Cardiovascular: Negative for chest pain, palpitations and peripheral edema.   Gastrointestinal: Negative for abdominal pain, constipation, diarrhea, heartburn, hematochezia and nausea.   Breasts:  Negative for tenderness, breast mass and discharge.   Genitourinary: Negative for dysuria, frequency, genital sores, hematuria, pelvic pain, urgency, vaginal bleeding and vaginal discharge.   Musculoskeletal: Negative for arthralgias, joint swelling and myalgias.   Skin: Negative for rash.   Neurological: Negative for dizziness, weakness, headaches and paresthesias.   Psychiatric/Behavioral: Negative for mood changes. The patient is not nervous/anxious.           OBJECTIVE:   /60 (BP Location: Right arm, Patient Position: Chair, Cuff Size: Adult Regular)   Pulse 65   Temp 97.6  F (36.4  C) (Oral)   Resp 16   Ht 1.626 m (5' 4\")   Wt 61.2 kg (135 lb)   LMP 10/25/2019 (Exact Date)   SpO2 98%   Breastfeeding? No   BMI 23.17 kg/m    Physical Exam  GENERAL: healthy, alert and no distress  EYES: Eyes grossly normal to inspection, PERRL and conjunctivae and sclerae normal  HENT: ear canals and TM's normal, nose and mouth without ulcers or lesions  NECK: no adenopathy, no asymmetry, masses, or scars and thyroid normal to palpation  RESP: lungs clear to auscultation - no rales, rhonchi or wheezes  BREAST: normal without masses, tenderness or nipple discharge and no palpable axillary masses or adenopathy  CV: regular rate and rhythm, normal S1 S2, no S3 or S4, no murmur, click or rub, no peripheral edema and peripheral pulses strong  ABDOMEN: soft, nontender, no hepatosplenomegaly, " "no masses and bowel sounds normal   (female): normal female external genitalia, normal urethral meatus, vaginal mucosa pink, moist, well rugated, and normal cervix/adnexa/uterus without masses or discharge  MS: no gross musculoskeletal defects noted, no edema  SKIN: no suspicious lesions or rashes  NEURO: Normal strength and tone, mentation intact and speech normal  PSYCH: mentation appears normal, affect normal/bright    Diagnostic Test Results:  Labs reviewed in Epic, followed by vascular, pt not wanting further labs today, other them rheum factor, sister with lupus and wants this checked due to toe pain(discussed this would not be a normal presentation)    ASSESSMENT/PLAN:   1. Routine general medical examination at a health care facility  Normal exam, pap and breast done  - Pap imaged thin layer screen with HPV - recommended age 30 - 65  - HPV High Risk Types DNA Cervical  - MA Screening Digital Bilateral; Future    2. Cyst of ovary, unspecified laterality  Hx of this, normal exam, discussed with pt, will order pelvic ultrasound  - US Pelvic Complete w Transvaginal; Future    3. Needs flu shot  Pt wanting to get this done  - C RIV4 (FLUBLOK) VACCINE RECOMBINANT DNA PRSRV ANTIBIO FREE, IM [[id number]]    4. Need for shingles vaccine  2nd shot today, last one gave her bodyaches for 24hrs  - ZOSTER VACCINE RECOMBINANT ADJUVANTED IM NJX    5. Multiple joint pain  Will screen, FHX of lupus, pt wanting to be tested  - Rheumatoid factor; Future    COUNSELING:  Reviewed preventive health counseling, as reflected in patient instructions       Regular exercise       Healthy diet/nutrition       ([place])menopause management    Estimated body mass index is 23.17 kg/m  as calculated from the following:    Height as of this encounter: 1.626 m (5' 4\").    Weight as of this encounter: 61.2 kg (135 lb).         reports that she has never smoked. She has never used smokeless tobacco.      Counseling Resources:  ATP IV " Guidelines  Pooled Cohorts Equation Calculator  Breast Cancer Risk Calculator  FRAX Risk Assessment  ICSI Preventive Guidelines  Dietary Guidelines for Americans, 2010  [employer]'s MyPlate  ASA Prophylaxis  Lung CA Screening    Deonna Carrasquillo MD  [facility]

## 2019-11-07 ENCOUNTER — ANCILLARY PROCEDURE (OUTPATIENT)
Dept: ULTRASOUND IMAGING | Facility: CLINIC | Age: 54
End: 2019-11-07
Attending: FAMILY MEDICINE
Payer: COMMERCIAL

## 2019-11-07 DIAGNOSIS — N83.209 CYST OF OVARY, UNSPECIFIED LATERALITY: ICD-10-CM

## 2019-11-07 PROCEDURE — 76856 US EXAM PELVIC COMPLETE: CPT | Performed by: OBSTETRICS & GYNECOLOGY

## 2019-11-07 PROCEDURE — 76830 TRANSVAGINAL US NON-OB: CPT | Performed by: OBSTETRICS & GYNECOLOGY

## 2019-11-08 LAB
COPATH REPORT: NORMAL
PAP: NORMAL

## 2019-11-12 LAB
FINAL DIAGNOSIS: NORMAL
HPV HR 12 DNA CVX QL NAA+PROBE: NEGATIVE
HPV16 DNA SPEC QL NAA+PROBE: NEGATIVE
HPV18 DNA SPEC QL NAA+PROBE: NEGATIVE
SPECIMEN DESCRIPTION: NORMAL
SPECIMEN SOURCE CVX/VAG CYTO: NORMAL

## 2019-11-15 ENCOUNTER — ANCILLARY PROCEDURE (OUTPATIENT)
Dept: MAMMOGRAPHY | Facility: CLINIC | Age: 54
End: 2019-11-15
Payer: COMMERCIAL

## 2019-11-15 DIAGNOSIS — Z12.31 VISIT FOR SCREENING MAMMOGRAM: ICD-10-CM

## 2019-11-15 DIAGNOSIS — E78.5 HYPERLIPIDEMIA LDL GOAL <130: ICD-10-CM

## 2019-11-15 DIAGNOSIS — R10.13 ABDOMINAL PAIN, EPIGASTRIC: ICD-10-CM

## 2019-11-15 LAB — CRP SERPL HS-MCNC: 0.9 MG/L

## 2019-11-15 PROCEDURE — 83704 LIPOPROTEIN BLD QUAN PART: CPT | Mod: 90 | Performed by: INTERNAL MEDICINE

## 2019-11-15 PROCEDURE — 77063 BREAST TOMOSYNTHESIS BI: CPT | Mod: TC

## 2019-11-15 PROCEDURE — 83516 IMMUNOASSAY NONANTIBODY: CPT | Mod: 59 | Performed by: INTERNAL MEDICINE

## 2019-11-15 PROCEDURE — 86141 C-REACTIVE PROTEIN HS: CPT | Performed by: INTERNAL MEDICINE

## 2019-11-15 PROCEDURE — 77067 SCR MAMMO BI INCL CAD: CPT | Mod: TC

## 2019-11-15 PROCEDURE — 83516 IMMUNOASSAY NONANTIBODY: CPT | Performed by: INTERNAL MEDICINE

## 2019-11-15 PROCEDURE — 99000 SPECIMEN HANDLING OFFICE-LAB: CPT | Performed by: INTERNAL MEDICINE

## 2019-11-15 PROCEDURE — 36415 COLL VENOUS BLD VENIPUNCTURE: CPT | Performed by: INTERNAL MEDICINE

## 2019-11-15 PROCEDURE — 83695 ASSAY OF LIPOPROTEIN(A): CPT | Mod: 90 | Performed by: INTERNAL MEDICINE

## 2019-11-17 LAB
CHOLEST SERPL-MCNC: 152 MG/DL
HDL SERPL QN: 8.9 NM
HDL SERPL-SCNC: 35.1 UMOL/L
HDLC SERPL-MCNC: 50 MG/DL (ref 40–59)
HLD.LARGE SERPL-SCNC: 5.3 UMOL/L
LDL SERPL QN: 21.2 NM
LDL SERPL-SCNC: 842 NMOL/L
LDL SMALL SERPL-SCNC: 331 NMOL/L
LDLC SERPL CALC-MCNC: 89 MG/DL
PATHOLOGY STUDY: ABNORMAL
TRIGL SERPL-MCNC: 64 MG/DL (ref 30–149)
TTG IGA SER-ACNC: 1 U/ML
TTG IGG SER-ACNC: 1 U/ML
VLDL LARGE SERPL-SCNC: <1.5 NMOL/L
VLDL SERPL QN: 47.3 NM

## 2019-11-20 LAB — LPA SERPL-MCNC: <6 MG/DL

## 2019-11-26 ENCOUNTER — ANCILLARY PROCEDURE (OUTPATIENT)
Dept: GENERAL RADIOLOGY | Facility: CLINIC | Age: 54
End: 2019-11-26
Attending: PODIATRIST
Payer: COMMERCIAL

## 2019-11-26 ENCOUNTER — OFFICE VISIT (OUTPATIENT)
Dept: PODIATRY | Facility: CLINIC | Age: 54
End: 2019-11-26
Payer: COMMERCIAL

## 2019-11-26 VITALS
HEIGHT: 64 IN | SYSTOLIC BLOOD PRESSURE: 110 MMHG | BODY MASS INDEX: 23.05 KG/M2 | WEIGHT: 135 LBS | DIASTOLIC BLOOD PRESSURE: 70 MMHG

## 2019-11-26 DIAGNOSIS — L60.0 ONYCHOCRYPTOSIS: ICD-10-CM

## 2019-11-26 DIAGNOSIS — S99.921S INJURY OF TOE ON RIGHT FOOT, SEQUELA: ICD-10-CM

## 2019-11-26 DIAGNOSIS — S99.921S INJURY OF TOE ON RIGHT FOOT, SEQUELA: Primary | ICD-10-CM

## 2019-11-26 PROCEDURE — 11750 EXCISION NAIL&NAIL MATRIX: CPT | Mod: T5 | Performed by: PODIATRIST

## 2019-11-26 PROCEDURE — 73660 X-RAY EXAM OF TOE(S): CPT | Mod: RT

## 2019-11-26 PROCEDURE — 99214 OFFICE O/P EST MOD 30 MIN: CPT | Mod: 25 | Performed by: PODIATRIST

## 2019-11-26 RX ORDER — SILVER SULFADIAZINE 10 MG/G
CREAM TOPICAL
Qty: 20 G | Refills: 0 | Status: SHIPPED | OUTPATIENT
Start: 2019-11-26 | End: 2020-06-30

## 2019-11-26 ASSESSMENT — MIFFLIN-ST. JEOR: SCORE: 1207.36

## 2019-11-26 NOTE — PATIENT INSTRUCTIONS
Thank you for choosing [facility]!    DR. WHITE'S CLINIC LOCATIONS:   MONDAY - [place] TUESDAY - [place]   [address] [facility]  [address]   [place] [address] [place] [address]   [phone]       THURSDAY AM - [place] THURSDAY PM - [place]   [address]   [place] [address] [place] [address]   [phone]       FRIDAY AM - [place] [facility] UP SURGERY: [phone] [address] APPOINTMENTS: [phone]   [place] [address] BILLING QUESTIONS: [phone] FAX NUMBER: [phone]     [facility] SCHEDULING  They should be calling you within 24 hours to schedule your scan.  If not, please call the location discussed at your appointment.    1) [facility]:       [phone]      [address] [place] [address]    2) [facility]:      [phone] [address] [place] [address]    3) [facility]:       [phone]      [address] [place] [id number]    * We will call you with the results. Please allow 24-48 hours for the results to be read and final.              [place] TOENAIL REMOVAL HOME INSTRUCTIONS  1. After the procedure, go home and elevate the foot/feet for the remainder of the day/evening as able. This is to minimize swelling, control pain, and limit post-procedural complications. The pre-procedural injection may cause your toe to be numb anywhere from 1-2 hours.    2. You can take Tylenol, Ibuprofen, Advil, etc as needed for pain if tolerated. Follow label instructions.     3. If you have been given a prescription for antibiotics, take them as instructed and complete the entire prescription.    4. Keep dressing intact until the following morning. Then remove the bandage (you may need to soak it in warm soapy water as the bandage will likely adhere to your skin).    5.  Start soaking in warm soapy water for 5-10 minutes twice a day. Wash the toe thoroughly, dry the toe thoroughly. Apply antibiotic wound ointment to base of wound and cover with gauze and Coban dressing (not too tightly) until it stops draining. This may take a few days to weeks, but at that point, you may continue with antibiotic ointment and a band-aid, or you may stop applying a dressing all together. Dressing changes should be done twice daily if you had the permanent/chemical procedure done.    6. You may do activities as tolerated the following day. Find a shoe that is comfortable and minimizes the amount of rubbing on your toe, as this may increase pain, swelling, etc.    7. Monitor for signs of infection. With this procedure, it is common to have mild surrounding redness and drainage. If the redness involves the entire toe or if you notice red streaks on top of your foot, or if you experience any nausea, vomiting, chills, fevers > 101 degrees, call clinic for a quick appointment.

## 2019-11-26 NOTE — PROGRESS NOTES
"Foot & Ankle Surgery   2019    S:  Pt is seen today for evaluation of R hallux pain 2/2 to 2 issues.  Previous toe injury 2015, xrays were neg for fracture.  Developed a nail irregularity.  She has undergone multiple procedures for nail issues, and her current issue is a recurrent nail spicule at the proximal-medial nail fold. She has removed this herself in the past, but it continues to cause discomfort.  She also continues to have discomfort within the toe itself, speficially at the interphalangeal joint.  She is very active, and this can limit her activities and can keep her up at night..    Vitals:    19 0749   BP: 110/70   Weight: 61.2 kg (135 lb)   Height: 1.626 m (5' 4\")   '      ROS - Pos for CC.  Patient denies current nausea, vomiting, chills, fevers, belly pain, calf pain, chest pain or SOB.  Complete remainder of ROS it otherwise neg.      PE:  Gen:   No apparent distress  Eye:    Visual scanning without deficit  Ear:    Response to auditory stimuli wnl  Lung:    Non-labored breathing on RA noted  Abd:    NTND per patient report  Lymph:    Neg for pitting/non-pitting edema BLE  Vasc:    Pulses palpable, CFT minimally delayed  Neuro:    Light touch sensation intact to all sensory nerve distributions without paresthesias  Derm:    Nail spicule at proximal-medial nail fold without paronychia or SOI  MSK:    R hallux - tenderness at proximal phalanx head, but main area of pain is interphalangeal joint.  No crepitus with PROM, but this is tender.  No specific tendon deficits noted today  Calf:    Neg for redness, swelling or tenderness      Imagin views R great toe WB - no acute MSK pathology noted.  IPJ unremarkable on films.    Assessment:  54 year old female with recurrent nail spicule medial R hallux sp previous nail removal procedures; chronic right great toe pain from 2015 injury      Plan:  Discussed etiologies, anatomy and options  1.  Recurrent nail spicule medial R hallux sp " previous nail removal procedures  -Regarding the nail, treatment options were discussed.  They elected to proceed with a procedure, Partial permanent avulsion.  See procedure note for details.  Risks that were discussed include but are not limited to infection, wound healing complications, nerve irritation, recurrence of the ingrown nail and the need for further procedures.  Antibiotic:  None needed  -rx for silvadene for BID dressing changes    After discussing the procedure, as well as risks, complications and post-procedure instructions, informed consent was obtained.    Anesthesia:  4 cc's of  1% lidocaine plain    Procedure:  After adequate prep, and with anesthesia achieved, a tourni-cot was applied to the involved toe(and removed after bandage application) and  attention was directed to the medial border of the R hallux where the nail spicule was freed from surrounding soft tissue.  The offending border was  using an English Anvil and then removed in total.  The base of the wound was explored and showed no necrotic tissue, purulence or debris.  Phenol was then applied to the base of the wound for 30s x 3, and sufficient isopropyl alcohol was used to irrigate the wound.  The base of the wound/ nail matrix was curetted after each acid application.  A clean dressing was applied loosely to prevent vascular insult.  The patient tolerated the procedure well without complications.    Post-procedural instructions were dispensed and discussed with the patient.  All questions were answered.      2.  Chronic right great toe interphalangeal joint pain from 2015 injury  -xrays today, personally reviewed  -MRI based on chronicity of symptoms.  Will call patient  -RICE/NSAID vs tylenol prn  -consider diagnostic/therapeutic IPJ injection under fluoro-guidance if neg for fracture non-union or soft tissue pathology.          Follow up:  2 weeks for toe or sooner with acute issues      Body mass index is 23.17  kg/m .           Mychal Hi, DPM FACFAS FACFAOM  Podiatric Foot & Ankle Surgeon  [facility]  [phone]

## 2019-12-10 ENCOUNTER — OFFICE VISIT (OUTPATIENT)
Dept: PODIATRY | Facility: CLINIC | Age: 54
End: 2019-12-10
Payer: COMMERCIAL

## 2019-12-10 VITALS
HEIGHT: 64 IN | BODY MASS INDEX: 23.05 KG/M2 | DIASTOLIC BLOOD PRESSURE: 70 MMHG | WEIGHT: 135 LBS | SYSTOLIC BLOOD PRESSURE: 112 MMHG

## 2019-12-10 DIAGNOSIS — S99.921S INJURY OF TOE ON RIGHT FOOT, SEQUELA: Primary | ICD-10-CM

## 2019-12-10 DIAGNOSIS — L60.0 ONYCHOCRYPTOSIS: ICD-10-CM

## 2019-12-10 PROCEDURE — 99213 OFFICE O/P EST LOW 20 MIN: CPT | Performed by: PODIATRIST

## 2019-12-10 ASSESSMENT — MIFFLIN-ST. JEOR: SCORE: 1207.36

## 2019-12-10 NOTE — PROGRESS NOTES
"Foot & Ankle Surgery   December 10, 2019    S:  Pt is seen today for evaluation of right great toe 2 weeks after P&A for recurrent nail spicule.  States it can be tender but looks good.  Had a question about the xray results and has her MRi scheduled for tomorrow.    Vitals:    12/10/19 0915   BP: 112/70   Weight: 61.2 kg (135 lb)   Height: 1.626 m (5' 4\")   '      ROS - Pos for CC.  Patient denies current nausea, vomiting, chills, fevers, belly pain, calf pain, chest pain or SOB.  Complete remainder of ROS it otherwise neg.      PE:  Gen:   No apparent distress  Eye:    Visual scanning without deficit  Ear:    Response to auditory stimuli wnl  Lung:    Non-labored breathing on RA noted  Abd:    NTND per patient report  Lymph:    Neg for pitting/non-pitting edema BLE  Vasc:    Pulses palpable, CFT minimally delayed  Neuro:    Light touch sensation intact to all sensory nerve distributions without paresthesias  Derm:    P&A site medial R hallux for nail spicule shows minimal inflammation, no drainage, no SOI  MSK:    R hallux - tenderness at proximal phalanx head, but main area of pain is interphalangeal joint.  No crepitus with PROM, but this is tender.  No specific tendon deficits noted today  Calf:    Neg for redness, swelling or tenderness    Imaging - IMPRESSION: No bony abnormality. Interval placement of a bandage  around the great toe.    Assessment:  54 year old female with healing R hallux 2 weeks after P&A      Plan:  Discussed etiologies, anatomy and options  1.  Healing R hallux 2 weeks after P&A  -continue post-procedure instructions until inflammation, drainage and pain have resolved; reviewed  -discussed that a small # of wounds fail to heal and rquire intervention.  Follow up in 2 weeks if the wound has failed to progress for a 30m appt for I&D. Otherwise, follow up prn.    -regarding xray results, personally reviewed imaging results  -she has the MRI tomorrow. I will call with results.    Follow up:  " prn or sooner with acute issues      Body mass index is 23.17 kg/m .           Mychal Hi DPM FACFAS FACFAOM  Podiatric Foot & Ankle Surgeon  [facility]  [phone]

## 2019-12-11 ENCOUNTER — HOSPITAL ENCOUNTER (OUTPATIENT)
Dept: MRI IMAGING | Facility: CLINIC | Age: 54
Discharge: HOME | End: 2019-12-11
Attending: PODIATRIST | Admitting: PODIATRIST
Payer: COMMERCIAL

## 2019-12-11 DIAGNOSIS — S99.921S INJURY OF TOE ON RIGHT FOOT, SEQUELA: ICD-10-CM

## 2019-12-11 PROCEDURE — 73721 MRI JNT OF LWR EXTRE W/O DYE: CPT | Mod: RT

## 2019-12-12 ENCOUNTER — TELEPHONE (OUTPATIENT)
Dept: PODIATRY | Facility: CLINIC | Age: 54
End: 2019-12-12

## 2019-12-12 DIAGNOSIS — S99.921S INJURY OF TOE ON RIGHT FOOT, SEQUELA: Primary | ICD-10-CM

## 2019-12-12 NOTE — TELEPHONE ENCOUNTER
I called and LVM for Yin regarding the MRI results:    IMPRESSION:  1. 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  2. Tearing of the first MTP lateral collateral ligament.    While tearing of the 1st MPJ collateral ligament was noted, no IPJ pathology.  Recommended proceeding with the IPJ fluoro-guided steroid injection @ [place].  Advised she MyChart/call if she would like to proceed.    Mychal Hi DPM FACFAS FACFAOM  Podiatric Foot & Ankle Surgeon  [facility]  [phone]

## 2019-12-13 NOTE — TELEPHONE ENCOUNTER
Routed to Dr. Hi to advise on injection order. LVM with radiology scheduling number to call on Monday or Tuesday once order has been placed on Monday as Dr RESTREPO is out of the office today.    Jennifer Mackey CMA  Podiatry/Foot & Ankle Surgery  [facility]

## 2019-12-13 NOTE — TELEPHONE ENCOUNTER
Pt returned call and will like to continue with procedure. Please call at  [phone] to schedule  Tita.

## 2019-12-16 NOTE — TELEPHONE ENCOUNTER
Order signed for x-ray-guided steroid injection right hallux interphalangeal joint @ [facility].    Mychal Hi DPM FACFAS FACFAOM  Podiatric Foot & Ankle Surgeon  [facility]  [phone]

## 2019-12-17 ENCOUNTER — NURSE TRIAGE (OUTPATIENT)
Dept: NURSING | Facility: CLINIC | Age: 54
End: 2019-12-17

## 2019-12-17 NOTE — TELEPHONE ENCOUNTER
Yin is calling regarding results of MRI and Xray that she recently had.  Yin is requesting a copy of her results.  FNa transferred Yin through to Medical Records.

## 2019-12-20 ENCOUNTER — HOSPITAL ENCOUNTER (OUTPATIENT)
Dept: GENERAL RADIOLOGY | Facility: CLINIC | Age: 54
End: 2019-12-20
Attending: PODIATRIST
Payer: COMMERCIAL

## 2019-12-20 ENCOUNTER — HOSPITAL ENCOUNTER (OUTPATIENT)
Facility: CLINIC | Age: 54
Discharge: HOME | End: 2019-12-20
Attending: PODIATRIST | Admitting: PODIATRIST
Payer: COMMERCIAL

## 2019-12-20 VITALS — HEART RATE: 65 BPM | SYSTOLIC BLOOD PRESSURE: 113 MMHG | OXYGEN SATURATION: 97 % | DIASTOLIC BLOOD PRESSURE: 81 MMHG

## 2019-12-20 DIAGNOSIS — S99.921S INJURY OF TOE ON RIGHT FOOT, SEQUELA: ICD-10-CM

## 2019-12-20 PROCEDURE — 20600 DRAIN/INJ JOINT/BURSA W/O US: CPT | Mod: RT

## 2019-12-20 PROCEDURE — 25500064 ZZH RX 255 OP 636: Performed by: PHYSICIAN ASSISTANT

## 2019-12-20 PROCEDURE — 25000125 ZZHC RX 250: Performed by: PHYSICIAN ASSISTANT

## 2019-12-20 PROCEDURE — 25000128 H RX IP 250 OP 636: Performed by: PHYSICIAN ASSISTANT

## 2019-12-20 RX ORDER — LIDOCAINE HYDROCHLORIDE 10 MG/ML
3 INJECTION, SOLUTION EPIDURAL; INFILTRATION; INTRACAUDAL; PERINEURAL ONCE
Status: COMPLETED | OUTPATIENT
Start: 2019-12-20 | End: 2019-12-20

## 2019-12-20 RX ORDER — IOPAMIDOL 408 MG/ML
10 INJECTION, SOLUTION INTRATHECAL ONCE
Status: COMPLETED | OUTPATIENT
Start: 2019-12-20 | End: 2019-12-20

## 2019-12-20 RX ORDER — TRIAMCINOLONE ACETONIDE 40 MG/ML
40 INJECTION, SUSPENSION INTRA-ARTICULAR; INTRAMUSCULAR ONCE
Status: COMPLETED | OUTPATIENT
Start: 2019-12-20 | End: 2019-12-20

## 2019-12-20 RX ORDER — ETHYL CHLORIDE 100 %
1 AEROSOL, SPRAY (ML) TOPICAL ONCE
Status: COMPLETED | OUTPATIENT
Start: 2019-12-20 | End: 2019-12-20

## 2019-12-20 RX ADMIN — LIDOCAINE HYDROCHLORIDE 1 ML: 10 INJECTION, SOLUTION EPIDURAL; INFILTRATION; INTRACAUDAL; PERINEURAL at 13:49

## 2019-12-20 RX ADMIN — Medication 1 APPLICATION.: at 13:49

## 2019-12-20 RX ADMIN — TRIAMCINOLONE ACETONIDE 40 MG: 40 INJECTION, SUSPENSION INTRA-ARTICULAR; INTRAMUSCULAR at 13:50

## 2019-12-20 RX ADMIN — IOPAMIDOL 0.25 ML: 408 INJECTION, SOLUTION INTRATHECAL at 13:50

## 2019-12-20 NOTE — DISCHARGE INSTRUCTIONS
Orthopedic Discharge Instructions:   After Your Injection  ________________________________________    Patient Name:  Paty Johnson  Today's Date:  December 20, 2019  The provider who performed your Toe Injection was Az PADILLA at [facility]) in the  Radiology Department.  Care of needle site    If you have new numbness down your leg, this may last up to 6 hours, but it should go away. You may need help with walking until your leg feels normal.     Over the next 24 to 48 hours, pain at the needle site may increase before it gets better.     For the next 48 hours, use ice packs for 15 minutes, three to four times a day for pain.    If you have a bandage, you may remove it the next morning.     No tub baths, hot tubs or swimming for 48 hours. You may shower the next day.   Activity    Do not drive until morning.     Limit your activity based on your pain level. Follow your doctor s orders for activity.     You may eat a normal diet.     If you had sedation,   - You may feel sleepy, forgetful or unsteady.   - Do not drink alcohol for 24 hours.  Medicines    If you take aspirin or platelet inhibitors, you can restart them tomorrow.     Restart all other medicines today at your regular dose, including Coumadin (warfarin).    If you are restarting Coumadin, talk to your doctor about having your INR checked.   If you had a steroid shot     The medicine should help reduce swelling and pain. This may take from 7 to 10 days.     Side effects from the shot will be mild and go away in 2 to 3 days. Common side effects may include:  -  Insomnia (trouble sleeping).  -  Heartburn.  -  Flushed face.  -  Water retention (bloating or fluid build-up).  -  Increased appetite (feeling more hungry than usual).  -  Increased blood sugar.  If you have diabetes, watch your blood sugar closely. If needed, call your doctor to help you control your blood sugar.  Some patients will get lasting relief from a single shot.  Others may require up to three shots to get results. If you have more than one steroid shot, they should be given two weeks apart.  Some patients do not have relief of symptoms.    Follow-up:  No follow-up needed     Call your doctor or go to the Emergency Room if you have severe pain, fever or problems with bowel or bladder control.

## 2019-12-20 NOTE — IP AVS SNAPSHOT
[facility] Radiology  [address] [facility] [address]  Phone:  [phone]                                    After Visit Summary   12/20/2019    Paty Johnson    MRN: [id number]           After Visit Summary Signature Page    I have received my discharge instructions, and my questions have been answered. I have discussed any challenges I see with this plan with the nurse or doctor.    ..........................................................................................................................................  Patient/Patient Representative Signature      ..........................................................................................................................................  Patient Representative Print Name and Relationship to Patient    ..................................................               ................................................  Date                                   Time    ..........................................................................................................................................  Reviewed by Signature/Title    ...................................................              ..............................................  Date                                               Time          22EPIC Rev 08/18

## 2019-12-20 NOTE — IP AVS SNAPSHOT
MRN:[id number]                      After Visit Summary   12/20/2019    Paty Johnson    MRN: [id number]           Visit Information        Provider Department      12/20/2019  1:00 PM BELINDA MICHAELS RAD; [facility] Radiology           Review of your medicines      UNREVIEWED medicines. Ask your doctor about these medicines       Dose / Directions   hydrochlorothiazide 25 MG tablet  Commonly known as:  HYDRODIURIL  Used for:  Hypertension goal BP (blood pressure) < 130/80      TAKE 1 TABLET EVERY MORNING  Quantity:  90 tablet  Refills:  2     lisinopril 40 MG tablet  Commonly known as:  PRINIVIL/ZESTRIL  Used for:  Hypertension goal BP (blood pressure) < 130/80      Dose:  40 mg  Take 1 tablet (40 mg) by mouth daily  Quantity:  90 tablet  Refills:  3     rosuvastatin 20 MG tablet  Commonly known as:  CRESTOR  Used for:  Hyperlipidemia LDL goal <130      Dose:  20 mg  Take 1 tablet (20 mg) by mouth daily  Quantity:  90 tablet  Refills:  3     silver sulfADIAZINE 1 % external cream  Commonly known as:  SILVADENE  Used for:  Onychocryptosis      Apply to procedure site right great toe twice daily with dressing changes until the wound heals.  Quantity:  20 g  Refills:  0              Protect others around you: Learn how to safely use, store and throw away your medicines at www.disposemymeds.org.       Follow-ups after your visit       Care Instructions       Further instructions from your care team         Orthopedic Discharge Instructions:   After Your Injection  ________________________________________    Patient Name:  Paty Johnson  Today's Date:  December 20, 2019  The provider who performed your Toe Injection was Az PADILLA at [facility]) in the  Radiology Department.  Care of needle site    If you have new numbness down your leg, this may last up to 6 hours, but it should go away. You may need help with walking until your leg feels normal.     Over the next 24  to 48 hours, pain at the needle site may increase before it gets better.     For the next 48 hours, use ice packs for 15 minutes, three to four times a day for pain.    If you have a bandage, you may remove it the next morning.     No tub baths, hot tubs or swimming for 48 hours. You may shower the next day.   Activity    Do not drive until morning.     Limit your activity based on your pain level. Follow your doctor s orders for activity.     You may eat a normal diet.     If you had sedation,   - You may feel sleepy, forgetful or unsteady.   - Do not drink alcohol for 24 hours.  Medicines    If you take aspirin or platelet inhibitors, you can restart them tomorrow.     Restart all other medicines today at your regular dose, including Coumadin (warfarin).    If you are restarting Coumadin, talk to your doctor about having your INR checked.   If you had a steroid shot     The medicine should help reduce swelling and pain. This may take from 7 to 10 days.     Side effects from the shot will be mild and go away in 2 to 3 days. Common side effects may include:  -  Insomnia (trouble sleeping).  -  Heartburn.  -  Flushed face.  -  Water retention (bloating or fluid build-up).  -  Increased appetite (feeling more hungry than usual).  -  Increased blood sugar.  If you have diabetes, watch your blood sugar closely. If needed, call your doctor to help you control your blood sugar.  Some patients will get lasting relief from a single shot. Others may require up to three shots to get results. If you have more than one steroid shot, they should be given two weeks apart.  Some patients do not have relief of symptoms.    Follow-up:  No follow-up needed     Call your doctor or go to the Emergency Room if you have severe pain, fever or problems with bowel or bladder control.     Additional Information About Your Visit       [employer] Information    [employer] gives you secure access to your electronic health record. If you see a primary  care provider, you can also send messages to your care team and make appointments. If you have questions, please call your primary care clinic.  If you do not have a primary care provider, please call [phone] and they will assist you.       Care EveryWhere ID    This is your Care EveryWhere ID. This could be used by other organizations to access your [facility] medical records  [id number]       Your Vitals Were  Most recent update: 12/20/2019  1:03 PM    Blood Pressure   113/81    Pulse   65    Pulse Oximetry   97%            Primary Care Provider Office Phone # Fax #    Volodymyrserena Eyal Berg [phone]      Equal Access to Services    [facility] : Hadii olya brooke hadasho Sodoroteo, waaxda luqadaha, qaybta kaalmada francisca, abraham la . So [facility] [phone].    ATENCIÓN: Si habla español, tiene a sauceda disposición servicios gratuitos de asistencia lingüística. Llame al [phone].    We comply with applicable federal and state civil rights laws, including the [place] Human Rights Act. We do not discriminate on the basis of race, color, creed, Mormonism, national origin, marital status, age, disability, sex, sexual orientation, or gender identity.       Thank you!    Thank you for choosing [facility] for your care. Our goal is always to provide you with excellent care. Hearing back from our patients is one way we can continue to improve our services. Please take a few minutes to complete the written survey that you may receive in the mail after you visit with us. Thank you!            Medication List      ASK your doctor about these medications          Morning Afternoon Evening Bedtime As Needed    hydrochlorothiazide 25 MG tablet  Also known as:  HYDRODIURIL  INSTRUCTIONS:  TAKE 1 TABLET EVERY MORNING                     lisinopril 40 MG tablet  Also known as:  PRINIVIL/ZESTRIL  INSTRUCTIONS:  Take 1 tablet (40 mg) by mouth daily  Doctor's comments:  Keep on  file until pt calls for refills.                     rosuvastatin 20 MG tablet  Also known as:  CRESTOR  INSTRUCTIONS:  Take 1 tablet (20 mg) by mouth daily                     silver sulfADIAZINE 1 % external cream  Also known as:  SILVADENE  INSTRUCTIONS:  Apply to procedure site right great toe twice daily with dressing changes until the wound heals.

## 2019-12-20 NOTE — PROGRESS NOTES
RADIOLOGY PROCEDURE NOTE  Patient name: Paty Johnson  MRN: [id number]  : 1965    Pre-procedure diagnosis: Right foot pain  Post-procedure diagnosis: Same    Procedure Date/Time: 2019  2:47 PM  Procedure: Right IP joint injection  Estimated blood loss: None  Specimen(s) collected with description: none  The patient tolerated the procedure well with no immediate complications.  Significant findings:none    See imaging dictation for procedural details.    Provider name: VERITO Yee  Assistant(s):None

## 2020-02-12 ENCOUNTER — OFFICE VISIT (OUTPATIENT)
Dept: OTHER | Facility: CLINIC | Age: 55
End: 2020-02-12
Attending: INTERNAL MEDICINE
Payer: COMMERCIAL

## 2020-02-12 VITALS
HEART RATE: 68 BPM | WEIGHT: 129 LBS | BODY MASS INDEX: 22.02 KG/M2 | SYSTOLIC BLOOD PRESSURE: 112 MMHG | HEIGHT: 64 IN | OXYGEN SATURATION: 96 % | RESPIRATION RATE: 14 BRPM | DIASTOLIC BLOOD PRESSURE: 69 MMHG

## 2020-02-12 DIAGNOSIS — I78.1 SPIDER VEIN, SYMPTOMATIC: ICD-10-CM

## 2020-02-12 DIAGNOSIS — Z00.00 ROUTINE GENERAL MEDICAL EXAMINATION AT A HEALTH CARE FACILITY: ICD-10-CM

## 2020-02-12 DIAGNOSIS — E78.5 HYPERLIPIDEMIA LDL GOAL <130: Primary | ICD-10-CM

## 2020-02-12 DIAGNOSIS — I10 HYPERTENSION GOAL BP (BLOOD PRESSURE) < 130/80: ICD-10-CM

## 2020-02-12 PROCEDURE — 99213 OFFICE O/P EST LOW 20 MIN: CPT | Mod: ZP | Performed by: INTERNAL MEDICINE

## 2020-02-12 PROCEDURE — G0463 HOSPITAL OUTPT CLINIC VISIT: HCPCS

## 2020-02-12 RX ORDER — LISINOPRIL 40 MG/1
40 TABLET ORAL DAILY
Qty: 90 TABLET | Refills: 3 | Status: SHIPPED | OUTPATIENT
Start: 2020-02-12 | End: 2020-06-30

## 2020-02-12 RX ORDER — HYDROCHLOROTHIAZIDE 25 MG/1
TABLET ORAL
Qty: 90 TABLET | Refills: 3 | Status: SHIPPED | OUTPATIENT
Start: 2020-02-12 | End: 2020-06-30

## 2020-02-12 RX ORDER — ROSUVASTATIN CALCIUM 10 MG/1
10 TABLET, COATED ORAL DAILY
Qty: 90 TABLET | Refills: 3 | Status: SHIPPED | OUTPATIENT
Start: 2020-02-12 | End: 2020-09-09

## 2020-02-12 ASSESSMENT — MIFFLIN-ST. JEOR: SCORE: 1180.14

## 2020-02-12 NOTE — PROGRESS NOTES
"Ptay Johnson is a 54 year old female who presents for:  Chief Complaint   Patient presents with     RECHECK     follow up to Labs done 11/15/19 - *LMB 01/14/20        Vitals:    Vitals:    02/12/20 0934   BP: 112/69   BP Location: Right arm   Patient Position: Chair   Cuff Size: Adult Regular   Pulse: 68   Resp: 14   SpO2: 96%   Weight: 129 lb (58.5 kg)   Height: 5' 4\" (1.626 m)       BMI:  Estimated body mass index is 22.14 kg/m  as calculated from the following:    Height as of this encounter: 5' 4\" (1.626 m).    Weight as of this encounter: 129 lb (58.5 kg).    Pain Score:  Data Unavailable        Janelle Camarena [facility]    "

## 2020-02-12 NOTE — PROGRESS NOTES
Paty Johnson is a 54 year old female who is presenting at the current time to discuss her diagnosi(es) of        Spider vein, symptomatic  Hypertension goal BP (blood pressure) < 130/80  Hyperlipidemia LDL goal <130  Routine general medical examination at a health care facility     HPI: She presents for f/u hyperlipidemia on Crestor 10 mg daily. She had difficulty toelrating 20 mg daily due to fatigue and myalgias so she self cut the dose in half. She also presents c/o painful spider veins in her left posterior thigh and calf.    PAST MEDICAL HISTORY:                  Past Medical History:   Diagnosis Date     Carcinoma in situ of cervix uteri 1993    treated with Effudex for 10 weeks     Irregular menstrual cycle     no infertility eval or problems     Unspecified essential hypertension 2007       PAST SURGICAL HISTORY:                  Past Surgical History:   Procedure Laterality Date     C NONSPECIFIC PROCEDURE  1993    laser ablation for cervix       CURRENT MEDICATIONS:                  Current Outpatient Medications   Medication Sig Dispense Refill     hydrochlorothiazide (HYDRODIURIL) 25 MG tablet TAKE 1 TABLET EVERY MORNING 90 tablet 3     lisinopril (PRINIVIL/ZESTRIL) 40 MG tablet Take 1 tablet (40 mg) by mouth daily 90 tablet 3     rosuvastatin (CRESTOR) 10 MG tablet Take 1 tablet (10 mg) by mouth daily 90 tablet 3     silver sulfADIAZINE (SILVADENE) 1 % external cream Apply to procedure site right great toe twice daily with dressing changes until the wound heals. 20 g 0       ALLERGIES:                  Allergies   Allergen Reactions     Nkda [No Known Drug Allergies]        SOCIAL HISTORY:                  Social History     Socioeconomic History     Marital status:      Spouse name: Mac     Number of children: 3     Years of education: 16     Highest education level: Not on file   Occupational History     Occupation:      Employer: NONE      Employer: [employer]   Social  Needs     Financial resource strain: Not on file     Food insecurity:     Worry: Not on file     Inability: Not on file     Transportation needs:     Medical: Not on file     Non-medical: Not on file   Tobacco Use     Smoking status: Never Smoker     Smokeless tobacco: Never Used   Substance and Sexual Activity     Alcohol use: Yes     Comment: 1-2 drinks per week     Drug use: No     Sexual activity: Yes     Partners: Male     Birth control/protection: Surgical     Comment:  had a vasectomy    Lifestyle     Physical activity:     Days per week: Not on file     Minutes per session: Not on file     Stress: Not on file   Relationships     Social connections:     Talks on phone: Not on file     Gets together: Not on file     Attends Anabaptism service: Not on file     Active member of club or organization: Not on file     Attends meetings of clubs or organizations: Not on file     Relationship status: Not on file     Intimate partner violence:     Fear of current or ex partner: Not on file     Emotionally abused: Not on file     Physically abused: Not on file     Forced sexual activity: Not on file   Other Topics Concern      Service Not Asked     Blood Transfusions Not Asked     Caffeine Concern Not Asked     Occupational Exposure Not Asked     Hobby Hazards Not Asked     Sleep Concern Not Asked     Stress Concern Not Asked     Weight Concern Not Asked     Special Diet Not Asked     Back Care Not Asked     Exercise Yes     Bike Helmet Not Asked     Seat Belt Yes     Self-Exams Not Asked     Parent/sibling w/ CABG, MI or angioplasty before 65F 55M? No   Social History Narrative     Not on file       FAMILY HISTORY:                   Family History   Problem Relation Age of Onset     Arthritis Sister         also has lupus     Respiratory Maternal Grandfather      Breast Cancer Paternal Grandmother      Cancer - colorectal Paternal Grandfather      Endocrine Disease Mother         prediabetes, neuropathy,  diet controlled     Hypertension Father         takes meds     C.A.D. No family hx of      Cerebrovascular Disease No family hx of      Diabetes No family hx of              Physical exam Reveals:    O/P: WNL  HEENT: WNL  NECK: No JVD, thyromegaly, or lymphadenopathy  HEART: RRR, no murmurs, gallops, or rubs  LUNGS: CTA bilaterally without rales, wheezes, or rhonchi  GI: NABS, nondistended, nontender, soft  EXT:without cyanosis, clubbing, or edema; CEAP C2 venous insufficiency, posterior left leg in thigh and calf  NEURO: nonfocal  : no flank tenderness      A/P:    (E78.5) Hyperlipidemia LDL goal <130  (primary encounter diagnosis)  Comment: at [facility] [place], reduce dose to 10 mg  Plan: rosuvastatin (CRESTOR) 10 MG tablet           (I78.1) Spider vein, symptomatic  Comment: refer to Dr. Adkins to discuss surgical treatment options  Plan: VASCULAR SURGERY REFERRAL           (I10) Hypertension goal BP (blood pressure) < 130/80  Comment: at goal  Plan: hydrochlorothiazide (HYDRODIURIL) 25 MG tablet,        lisinopril (PRINIVIL/ZESTRIL) 40 MG tablet           (Z00.00) Routine general medical examination at a health care facility  Comment: check labs in August.  Plan: CBC with platelets differential, T3 Free, T4         free, TSH, Basic metabolic panel, Hepatic         panel, Lipid Profile, Hemoglobin A1c

## 2020-02-13 ENCOUNTER — OFFICE VISIT (OUTPATIENT)
Dept: VASCULAR SURGERY | Facility: CLINIC | Age: 55
End: 2020-02-13
Payer: COMMERCIAL

## 2020-02-13 DIAGNOSIS — I78.1 SPIDER VEIN, SYMPTOMATIC: ICD-10-CM

## 2020-02-13 DIAGNOSIS — I78.1 TELANGIECTASIA: Primary | ICD-10-CM

## 2020-02-13 DIAGNOSIS — I78.1 SPIDER VEIN, SYMPTOMATIC: Primary | ICD-10-CM

## 2020-02-13 DIAGNOSIS — I78.1 SPIDER TELANGIECTASIS OF SKIN: ICD-10-CM

## 2020-02-13 PROCEDURE — S9999 SALES TAX: HCPCS | Performed by: SURGERY

## 2020-02-13 PROCEDURE — A6533 GC STOCKING THIGHLNGTH 18-30: HCPCS | Performed by: SURGERY

## 2020-02-13 PROCEDURE — 36468 NJX SCLRSNT SPIDER VEINS: CPT | Performed by: SURGERY

## 2020-02-13 PROCEDURE — 99203 OFFICE O/P NEW LOW 30 MIN: CPT | Performed by: SURGERY

## 2020-02-13 NOTE — PROGRESS NOTES
[facility] Procedure Note    Paty Johnson  February 13, 2020    Indications:  Bilateral lower extremity spider telangiectasias and reticular veins of cosmetic concern    Procedure:  Cosmetic sclerotherapy bilateral lower extremity spider telangiectasias using 0.5% polidocanol foam    Procedure description:  Details of the procedure including risks of allergic reaction, deep vein thrombosis, ulceration, hyperpigmentation and matting were discussed.  The patient voiced understanding and wished to proceed.  Informed consent was obtained.    Cosmetic sclerotherapy:  I donned the Syris headlight and injected numerous telangiectasias and reticular veins bilaterally using 0.5% polidocanol foam.  The worst area was a cluster of telangiectasias on the distal posterior lateral left thigh with a reticular vein extending down across the popliteal fossa and onto the upper posterior left calf.  We injected both anteriorly and posteriorly from the ankles to the upper thighs.  We used a total of 2 syringes of foam.  The foam was mixed in a 1 part polidocanol to 2 parts air mixture.    The patient tolerated the procedure well without evidence for allergic reaction or other complications.  After completing the sclerotherapy, her legs were cleaned, thigh-high compression applied.  We had her walk for 10 minutes prior to getting in her car to drive home and encouraged her to be active range of the day.    Postoperative instructions were given.  She will return in 6 weeks in follow-up.    Sclerotherapy  Date/Time: 2/13/2020 1:25 PM  Performed by: Anders Adkins MD  Authorized by: Anders Adkins MD     Time out: Immediately prior to the procedure a time out was called    Type:  Cosmetic  Session:  Full  Procedure side:  Bilateral  Solution/Amount:  .5 POLIDOCANOL  Syringes::  2 syringes (6 mL)  Patient tolerance:  Patient tolerated the procedure well with no immediate complications  Wrap/Hose:   Nii Adkins MD

## 2020-02-13 NOTE — LETTER
2/13/2020         RE: Paty Johnson  [address]  [facility] [address]        Dear Colleague,    Thank you for referring your patient, Paty Johnson, to the [facility] [place]. Please see a copy of my visit note below.    [facility]    HPI:    Paty Johnson is a 55 yo female referred by Dr. Berg for evaluation bilateral cosmetic spider veins.  She has had left lower extremity veins injected several years ago but is seen them recur.  She admits to some mild pain in the area of the spider veins but no significant leg pain or bulging varicose veins are appreciated.    She is a very active person, running on a daily basis, stating that the veins on the back of the left thighbulge after running.    She has no history of deep vein thrombosis, superficial thrombophlebitis or significant trauma to her legs.    PAST MEDICAL HISTORY:   Past Medical History:   Diagnosis Date     Carcinoma in situ of cervix uteri 1993    treated with Effudex for 10 weeks     Irregular menstrual cycle     no infertility eval or problems     Unspecified essential hypertension 2007       PAST SURGICAL HISTORY:   Past Surgical History:   Procedure Laterality Date     C NONSPECIFIC PROCEDURE  1993    laser ablation for cervix       FAMILY HISTORY:   Family History   Problem Relation Age of Onset     Arthritis Sister         also has lupus     Respiratory Maternal Grandfather      Breast Cancer Paternal Grandmother      Cancer - colorectal Paternal Grandfather      Endocrine Disease Mother         prediabetes, neuropathy, diet controlled     Hypertension Father         takes meds     C.A.D. No family hx of      Cerebrovascular Disease No family hx of      Diabetes No family hx of        SOCIAL HISTORY:   Social History     Tobacco Use     Smoking status: Never Smoker     Smokeless tobacco: Never Used   Substance Use Topics     Alcohol use: Yes     Comment: 1-2 drinks per  week       REVIEW OF SYSTEMS: Review Of Systems   Skin: nail changes  Eyes: negative  Ears/Nose/Throat: negative  Respiratory: No shortness of breath, dyspnea on exertion, cough, or hemoptysis  Cardiovascular: negative  Gastrointestinal: negative  Genitourinary: negative  Musculoskeletal: Arthritis, foot pain, leg pain  Neurologic: negative  Psychiatric: negative  Hematologic/Lymphatic/Immunologic: negative  Endocrine: Mild hypothyroidism, being monitored currently      Vital signs:  LMP 01/25/2020   Breastfeeding No     Current Outpatient Medications   Medication Sig Dispense Refill     hydrochlorothiazide (HYDRODIURIL) 25 MG tablet TAKE 1 TABLET EVERY MORNING 90 tablet 3     lisinopril (PRINIVIL/ZESTRIL) 40 MG tablet Take 1 tablet (40 mg) by mouth daily 90 tablet 3     rosuvastatin (CRESTOR) 10 MG tablet Take 1 tablet (10 mg) by mouth daily 90 tablet 3     silver sulfADIAZINE (SILVADENE) 1 % external cream Apply to procedure site right great toe twice daily with dressing changes until the wound heals. 20 g 0     Allergies:  NKDA    PHYSICAL EXAM:  General: Pleasant, NAD.   HEENT: Normocephalic, atraumatic, external ears and nose normal.   Respiratory: Normal respiratory effort.   Cardiovascular: Pulse is regular.   Musculoskeletal: Gait and station normal.  The joints of her fingers and toes without deformity.  There is no cyanosis of her nailbeds.   EXTREMITIES: Right lower extremity: There is a cluster of telangiectasias on the mid to distal posterior lateral right thigh with a reticular vein extending down to the popliteal fossa and onto the upper posterior right calf.    PULSES: R/L (3=normal pulse, 0=no palpable pulse) dorsalis pedis: 3/3; posterior tibial: 3/3.      Neurologic: Grossly normal  Psychiatric: Mood, affect, judgment and insight are normal     ASSESSMENT:  Mild lower extremity pain with reticular veins and telangiectasias.  I do not appreciate any significant varicose veins suggestive of any  underlying significant venous insufficiency.  The pathophysiology of venous insufficiency was discussed.  I do not feel that the telangiectasias and reticular veins are of any significant medical risk to her.  Conservative management of these reticular veins and telangiectasias was discussed including small risk that the reticular vein could enlarge and become a varicose vein in the future.  Hemorrhage from the telangiectasias very unlikely.    We discussed the option lower extremity venous duplex ultrasound to assess for underlying venous insufficiency or simply proceeding with treatment of the telangiectasias and reticular veins with sclerotherapy.  She understands that treating these veins may not alleviate her pain.  She voiced understanding.    Details of sclerotherapy including risks of allergic reaction, deep antibiosis, hyperpigmentation, ulceration and matting were discussed.  She understands that sclerotherapy will not be covered by insurance and will be her responsibility.  She voiced understanding and may return in the near future for sclerotherapy.    PLAN:  Bilateral lower extremity cosmetic sclerotherapy.     Anders Adkins MD          Again, thank you for allowing me to participate in the care of your patient.        Sincerely,        Anders Adkins MD

## 2020-02-13 NOTE — NURSING NOTE
Pt. Has bought black thigh high closed toe compression hose. 20-30mmhg. Size 2     Deanna Thomas MA

## 2020-02-13 NOTE — PROGRESS NOTES
[facility]    HPI:    Paty Johnson is a 53 yo female referred by Dr. Berg for evaluation bilateral cosmetic spider veins.  She has had left lower extremity veins injected several years ago but is seen them recur.  She admits to some mild pain in the area of the spider veins but no significant leg pain or bulging varicose veins are appreciated.    She is a very active person, running on a daily basis, stating that the veins on the back of the left thighbulge after running.    She has no history of deep vein thrombosis, superficial thrombophlebitis or significant trauma to her legs.    PAST MEDICAL HISTORY:   Past Medical History:   Diagnosis Date     Carcinoma in situ of cervix uteri 1993    treated with Effudex for 10 weeks     Irregular menstrual cycle     no infertility eval or problems     Unspecified essential hypertension 2007       PAST SURGICAL HISTORY:   Past Surgical History:   Procedure Laterality Date     C NONSPECIFIC PROCEDURE  1993    laser ablation for cervix       FAMILY HISTORY:   Family History   Problem Relation Age of Onset     Arthritis Sister         also has lupus     Respiratory Maternal Grandfather      Breast Cancer Paternal Grandmother      Cancer - colorectal Paternal Grandfather      Endocrine Disease Mother         prediabetes, neuropathy, diet controlled     Hypertension Father         takes meds     C.A.D. No family hx of      Cerebrovascular Disease No family hx of      Diabetes No family hx of        SOCIAL HISTORY:   Social History     Tobacco Use     Smoking status: Never Smoker     Smokeless tobacco: Never Used   Substance Use Topics     Alcohol use: Yes     Comment: 1-2 drinks per week       REVIEW OF SYSTEMS: Review Of Systems   Skin: nail changes  Eyes: negative  Ears/Nose/Throat: negative  Respiratory: No shortness of breath, dyspnea on exertion, cough, or hemoptysis  Cardiovascular: negative  Gastrointestinal: negative  Genitourinary:  negative  Musculoskeletal: Arthritis, foot pain, leg pain  Neurologic: negative  Psychiatric: negative  Hematologic/Lymphatic/Immunologic: negative  Endocrine: Mild hypothyroidism, being monitored currently      Vital signs:  LMP 01/25/2020   Breastfeeding No     Current Outpatient Medications   Medication Sig Dispense Refill     hydrochlorothiazide (HYDRODIURIL) 25 MG tablet TAKE 1 TABLET EVERY MORNING 90 tablet 3     lisinopril (PRINIVIL/ZESTRIL) 40 MG tablet Take 1 tablet (40 mg) by mouth daily 90 tablet 3     rosuvastatin (CRESTOR) 10 MG tablet Take 1 tablet (10 mg) by mouth daily 90 tablet 3     silver sulfADIAZINE (SILVADENE) 1 % external cream Apply to procedure site right great toe twice daily with dressing changes until the wound heals. 20 g 0     Allergies:  NKDA    PHYSICAL EXAM:  General: Pleasant, NAD.   HEENT: Normocephalic, atraumatic, external ears and nose normal.   Respiratory: Normal respiratory effort.   Cardiovascular: Pulse is regular.   Musculoskeletal: Gait and station normal.  The joints of her fingers and toes without deformity.  There is no cyanosis of her nailbeds.   EXTREMITIES: Right lower extremity: There is a cluster of telangiectasias on the mid to distal posterior lateral right thigh with a reticular vein extending down to the popliteal fossa and onto the upper posterior right calf.    PULSES: R/L (3=normal pulse, 0=no palpable pulse) dorsalis pedis: 3/3; posterior tibial: 3/3.      Neurologic: Grossly normal  Psychiatric: Mood, affect, judgment and insight are normal     ASSESSMENT:  Mild lower extremity pain with reticular veins and telangiectasias.  I do not appreciate any significant varicose veins suggestive of any underlying significant venous insufficiency.  The pathophysiology of venous insufficiency was discussed.  I do not feel that the telangiectasias and reticular veins are of any significant medical risk to her.  Conservative management of these reticular veins and  telangiectasias was discussed including small risk that the reticular vein could enlarge and become a varicose vein in the future.  Hemorrhage from the telangiectasias very unlikely.    We discussed the option lower extremity venous duplex ultrasound to assess for underlying venous insufficiency or simply proceeding with treatment of the telangiectasias and reticular veins with sclerotherapy.  She understands that treating these veins may not alleviate her pain.  She voiced understanding.    Details of sclerotherapy including risks of allergic reaction, deep antibiosis, hyperpigmentation, ulceration and matting were discussed.  She understands that sclerotherapy will not be covered by insurance and will be her responsibility.  She voiced understanding and may return in the near future for sclerotherapy.    PLAN:  Bilateral lower extremity cosmetic sclerotherapy.     Anders Adkins MD

## 2020-02-13 NOTE — LETTER
2/13/2020         RE: Paty Johnson  [address]  [facility] [address]        Dear Colleague,    Thank you for [facility] your patient, [facility]y Johnson, [facility] [place]. Please see a copy of my visit note below.        [facility]    Paty Johnson  February 13, 2020    Indications:  Bilateral lower extremity spider telangiectasias and reticular veins of cosmetic concern    Procedure:  Cosmetic sclerotherapy bilateral lower extremity spider telangiectasias using 0.5% polidocanol foam    Procedure description:  Details of the procedure including risks of allergic reaction, deep vein thrombosis, ulceration, hyperpigmentation and matting were discussed.  The patient voiced understanding and wished to proceed.  Informed consent was obtained.    Cosmetic sclerotherapy:  I donned the Syris headlight and injected numerous telangiectasias and reticular veins bilaterally using 0.5% polidocanol foam.  The worst area was a cluster of telangiectasias on the distal posterior lateral left thigh with a reticular vein extending down across the popliteal fossa and onto the upper posterior left calf.  We injected both anteriorly and posteriorly from the ankles to the upper thighs.  We used a total of 2 syringes of foam.  The foam was mixed in a 1 part polidocanol to 2 parts air mixture.    The patient tolerated the procedure well without evidence for allergic reaction or other complications.  After completing the sclerotherapy, her legs were cleaned, thigh-high compression applied.  We had her walk for 10 minutes prior to getting in her car to drive home and encouraged her to be active range of the day.    Postoperative instructions were given.  She will return in 6 weeks in follow-up.    Sclerotherapy  Date/Time: 2/13/2020 1:25 PM  Performed by: Anders Adkins MD  Authorized by: Anders Adkins MD     Time out: Immediately  prior to the procedure a time out was called    Type:  Cosmetic  Session:  Full  Procedure side:  Bilateral  Solution/Amount:  .5 POLIDOCANOL  Syringes::  2 syringes (6 mL)  Patient tolerance:  Patient tolerated the procedure well with no immediate complications  Wrap/Hose:  Nii Adkins MD    Again, thank you for allowing me to participate in the care of your patient.        Sincerely,        Anders Adkins MD

## 2020-06-08 ENCOUNTER — OFFICE VISIT (OUTPATIENT)
Dept: FAMILY MEDICINE | Facility: CLINIC | Age: 55
End: 2020-06-08
Payer: COMMERCIAL

## 2020-06-08 ENCOUNTER — ANCILLARY PROCEDURE (OUTPATIENT)
Dept: GENERAL RADIOLOGY | Facility: CLINIC | Age: 55
End: 2020-06-08
Attending: FAMILY MEDICINE
Payer: COMMERCIAL

## 2020-06-08 VITALS
RESPIRATION RATE: 14 BRPM | DIASTOLIC BLOOD PRESSURE: 66 MMHG | WEIGHT: 123 LBS | BODY MASS INDEX: 21.11 KG/M2 | HEART RATE: 58 BPM | SYSTOLIC BLOOD PRESSURE: 122 MMHG | OXYGEN SATURATION: 97 % | TEMPERATURE: 98 F

## 2020-06-08 DIAGNOSIS — S80.851A FOREIGN BODY OF RIGHT LOWER LEG, INITIAL ENCOUNTER: ICD-10-CM

## 2020-06-08 DIAGNOSIS — S80.851A FOREIGN BODY OF RIGHT LOWER LEG, INITIAL ENCOUNTER: Primary | ICD-10-CM

## 2020-06-08 DIAGNOSIS — L03.119 CELLULITIS OF LOWER LEG: ICD-10-CM

## 2020-06-08 PROCEDURE — 73590 X-RAY EXAM OF LOWER LEG: CPT | Mod: RT

## 2020-06-08 PROCEDURE — 10120 INC&RMVL FB SUBQ TISS SMPL: CPT | Performed by: FAMILY MEDICINE

## 2020-06-08 RX ORDER — CEPHALEXIN 500 MG/1
500 CAPSULE ORAL 2 TIMES DAILY
Qty: 20 CAPSULE | Refills: 0 | Status: SHIPPED | OUTPATIENT
Start: 2020-06-08 | End: 2020-06-18

## 2020-06-08 NOTE — PROGRESS NOTES
Subjective     Paty Johnson is a 54 year old female who presents to clinic today for the following health issues:    HPI   Derm concern      Duration: 2 days    Description  Location: R lower leg  Itching: mild    Intensity:  moderate    Accompanying signs and symptoms: swelling    History (similar episodes/previous evaluation): None    Precipitating or alleviating factors:  New exposures:  None  Recent travel: no      Therapies tried and outcome: peroxide and bacitracin, tylenol    Mowing lawn several days ago, stick kicked back into R shin.  Feels there are still several splinters in her skin, although did remove several pieces herself.  Feels tight, warm, swollen, but feels it's stable or perhaps a small amount larger.  No fever, pus, bleeding.         Review of Systems         Objective    /66 (BP Location: Right arm, Patient Position: Chair, Cuff Size: Adult Regular)   Pulse 58   Temp 98  F (36.7  C) (Oral)   Resp 14   Wt 55.8 kg (123 lb)   SpO2 97%   BMI 21.11 kg/m    Body mass index is 21.11 kg/m .  Physical Exam  Vitals signs reviewed.   Constitutional:       Appearance: Normal appearance.   Musculoskeletal:        Legs:       Comments: Induration and erythema associated with superficial abrasion of middle anterior lower leg.   Neurological:      General: No focal deficit present.      Mental Status: She is alert and oriented to person, place, and time.        Assessment and Plan    (S80.945Q) Foreign body of right lower leg, initial encounter  (primary encounter diagnosis)  Comment: XR reviewed, no radiopaque FB, subQ fluid or gas noted.      Verbal consent obtained.  Area prepped with alcohol.  Anesthesia with 1% lidocaine with epi.  Wound carefully explored and 3 sub mm foreign bodies removed.  Dry dressing applied.  Patient tolerated procedure well.      Plan: XR Tibia & Fibula Right 2 Views, REMOVE FOREIGN        BODY SIMPLE            (L03.119) Cellulitis of lower leg  Comment:  Advised to use ice and tyelnol PRN for pain, should be improved w/i 24h, f/u ASAP if not.  Plan: cephALEXin (KEFLEX) 500 MG capsule              RTC in 1w prn    Fredo Ward MD

## 2020-06-22 ENCOUNTER — TELEPHONE (OUTPATIENT)
Dept: VASCULAR SURGERY | Facility: CLINIC | Age: 55
End: 2020-06-22

## 2020-06-22 DIAGNOSIS — I10 HYPERTENSION GOAL BP (BLOOD PRESSURE) < 130/80: ICD-10-CM

## 2020-06-22 RX ORDER — HYDROCHLOROTHIAZIDE 25 MG/1
TABLET ORAL
Qty: 90 TABLET | Refills: 2 | OUTPATIENT
Start: 2020-06-22

## 2020-06-22 RX ORDER — LISINOPRIL 40 MG/1
TABLET ORAL
Qty: 90 TABLET | Refills: 2 | OUTPATIENT
Start: 2020-06-22

## 2020-06-22 NOTE — TELEPHONE ENCOUNTER
Called pt and LDVM regarding rescheduling her sclerotherapy appt with Dr. Adkins from March.    Gave our main number to call back.    Gabriela Ramon, BSN, RN  [facility]  [facility]

## 2020-06-23 DIAGNOSIS — I10 HYPERTENSION GOAL BP (BLOOD PRESSURE) < 130/80: ICD-10-CM

## 2020-06-23 RX ORDER — LISINOPRIL 40 MG/1
TABLET ORAL
Qty: 90 TABLET | Refills: 2 | OUTPATIENT
Start: 2020-06-23

## 2020-06-23 NOTE — TELEPHONE ENCOUNTER
lisinopril (PRINIVIL/ZESTRIL) 40 MG tablet  Last Written Prescription Date:  2/12/20  Last Fill Quantity: 90,  # refills: 3       Last office visit: 2/12/2020     One year supply 2/12/20.  This request denied.  Aria Sharpe RN BSN  [facility]  [phone]

## 2020-06-30 ENCOUNTER — MYC MEDICAL ADVICE (OUTPATIENT)
Dept: OTHER | Facility: CLINIC | Age: 55
End: 2020-06-30

## 2020-06-30 DIAGNOSIS — I10 HYPERTENSION GOAL BP (BLOOD PRESSURE) < 130/80: ICD-10-CM

## 2020-06-30 RX ORDER — HYDROCHLOROTHIAZIDE 25 MG/1
TABLET ORAL
Qty: 90 TABLET | Refills: 3 | Status: SHIPPED | OUTPATIENT
Start: 2020-06-30 | End: 2020-09-09

## 2020-06-30 RX ORDER — LISINOPRIL 40 MG/1
40 TABLET ORAL DAILY
Qty: 90 TABLET | Refills: 3 | Status: SHIPPED | OUTPATIENT
Start: 2020-06-30 | End: 2020-09-09

## 2020-06-30 NOTE — TELEPHONE ENCOUNTER
Request for  hydrochlorothiazide (HYDRODIURIL) 25 MG tablet  Rx 2/12/20 Q90 R3    lisinopril (PRINIVIL/ZESTRIL) 40 MG tablet  Last Written Prescription Date:  2/12/20  Last Fill Quantity: 90,  # refills: 3     Last office visit: 2/12/20    Prescriptions transferred to mail order.    Aria Sharpe RN BSN  [facility]  [phone]

## 2020-08-20 DIAGNOSIS — Z00.00 ROUTINE GENERAL MEDICAL EXAMINATION AT A HEALTH CARE FACILITY: ICD-10-CM

## 2020-08-20 LAB
ALBUMIN SERPL-MCNC: 4.3 G/DL (ref 3.4–5)
ALP SERPL-CCNC: 33 U/L (ref 40–150)
ALT SERPL W P-5'-P-CCNC: 20 U/L (ref 0–50)
ANION GAP SERPL CALCULATED.3IONS-SCNC: 5 MMOL/L (ref 3–14)
AST SERPL W P-5'-P-CCNC: 14 U/L (ref 0–45)
BASOPHILS # BLD AUTO: 0 10E9/L (ref 0–0.2)
BASOPHILS NFR BLD AUTO: 0 %
BILIRUB DIRECT SERPL-MCNC: 0.3 MG/DL (ref 0–0.2)
BILIRUB SERPL-MCNC: 1.2 MG/DL (ref 0.2–1.3)
BUN SERPL-MCNC: 14 MG/DL (ref 7–30)
CALCIUM SERPL-MCNC: 9.7 MG/DL (ref 8.5–10.1)
CHLORIDE SERPL-SCNC: 104 MMOL/L (ref 94–109)
CHOLEST SERPL-MCNC: 139 MG/DL
CO2 SERPL-SCNC: 28 MMOL/L (ref 20–32)
CREAT SERPL-MCNC: 0.84 MG/DL (ref 0.52–1.04)
DIFFERENTIAL METHOD BLD: NORMAL
EOSINOPHIL # BLD AUTO: 0.1 10E9/L (ref 0–0.7)
EOSINOPHIL NFR BLD AUTO: 1.5 %
ERYTHROCYTE [DISTWIDTH] IN BLOOD BY AUTOMATED COUNT: 12.8 % (ref 10–15)
GFR SERPL CREATININE-BSD FRML MDRD: 79 ML/MIN/{1.73_M2}
GLUCOSE SERPL-MCNC: 87 MG/DL (ref 70–99)
HBA1C MFR BLD: 5.1 % (ref 0–5.6)
HCT VFR BLD AUTO: 44.3 % (ref 35–47)
HDLC SERPL-MCNC: 64 MG/DL
HGB BLD-MCNC: 14.9 G/DL (ref 11.7–15.7)
LDLC SERPL CALC-MCNC: 64 MG/DL
LYMPHOCYTES # BLD AUTO: 1.2 10E9/L (ref 0.8–5.3)
LYMPHOCYTES NFR BLD AUTO: 23 %
MCH RBC QN AUTO: 30.5 PG (ref 26.5–33)
MCHC RBC AUTO-ENTMCNC: 33.6 G/DL (ref 31.5–36.5)
MCV RBC AUTO: 91 FL (ref 78–100)
MONOCYTES # BLD AUTO: 0.5 10E9/L (ref 0–1.3)
MONOCYTES NFR BLD AUTO: 10 %
NEUTROPHILS # BLD AUTO: 3.4 10E9/L (ref 1.6–8.3)
NEUTROPHILS NFR BLD AUTO: 65.5 %
NONHDLC SERPL-MCNC: 75 MG/DL
PLATELET # BLD AUTO: 263 10E9/L (ref 150–450)
POTASSIUM SERPL-SCNC: 4.2 MMOL/L (ref 3.4–5.3)
PROT SERPL-MCNC: 8.3 G/DL (ref 6.8–8.8)
RBC # BLD AUTO: 4.88 10E12/L (ref 3.8–5.2)
SODIUM SERPL-SCNC: 137 MMOL/L (ref 133–144)
T3FREE SERPL-MCNC: 2.5 PG/ML (ref 2.3–4.2)
T4 FREE SERPL-MCNC: 0.77 NG/DL (ref 0.76–1.46)
TRIGL SERPL-MCNC: 53 MG/DL
TSH SERPL DL<=0.005 MIU/L-ACNC: 5.24 MU/L (ref 0.4–4)
WBC # BLD AUTO: 5.2 10E9/L (ref 4–11)

## 2020-08-20 PROCEDURE — 84439 ASSAY OF FREE THYROXINE: CPT | Performed by: INTERNAL MEDICINE

## 2020-08-20 PROCEDURE — 36415 COLL VENOUS BLD VENIPUNCTURE: CPT | Performed by: INTERNAL MEDICINE

## 2020-08-20 PROCEDURE — 80061 LIPID PANEL: CPT | Performed by: INTERNAL MEDICINE

## 2020-08-20 PROCEDURE — 84443 ASSAY THYROID STIM HORMONE: CPT | Performed by: INTERNAL MEDICINE

## 2020-08-20 PROCEDURE — 85025 COMPLETE CBC W/AUTO DIFF WBC: CPT | Performed by: INTERNAL MEDICINE

## 2020-08-20 PROCEDURE — 83036 HEMOGLOBIN GLYCOSYLATED A1C: CPT | Performed by: INTERNAL MEDICINE

## 2020-08-20 PROCEDURE — 80048 BASIC METABOLIC PNL TOTAL CA: CPT | Performed by: INTERNAL MEDICINE

## 2020-08-20 PROCEDURE — 80076 HEPATIC FUNCTION PANEL: CPT | Performed by: INTERNAL MEDICINE

## 2020-08-20 PROCEDURE — 84481 FREE ASSAY (FT-3): CPT | Performed by: INTERNAL MEDICINE

## 2020-09-09 ENCOUNTER — OFFICE VISIT (OUTPATIENT)
Dept: OTHER | Facility: CLINIC | Age: 55
End: 2020-09-09
Attending: INTERNAL MEDICINE
Payer: COMMERCIAL

## 2020-09-09 VITALS
SYSTOLIC BLOOD PRESSURE: 123 MMHG | OXYGEN SATURATION: 97 % | DIASTOLIC BLOOD PRESSURE: 78 MMHG | HEIGHT: 64 IN | HEART RATE: 52 BPM | BODY MASS INDEX: 21 KG/M2 | RESPIRATION RATE: 14 BRPM | WEIGHT: 123 LBS

## 2020-09-09 DIAGNOSIS — I10 HYPERTENSION GOAL BP (BLOOD PRESSURE) < 130/80: ICD-10-CM

## 2020-09-09 DIAGNOSIS — E78.5 HYPERLIPIDEMIA LDL GOAL <130: ICD-10-CM

## 2020-09-09 DIAGNOSIS — Z00.00 ROUTINE GENERAL MEDICAL EXAMINATION AT A HEALTH CARE FACILITY: Primary | ICD-10-CM

## 2020-09-09 DIAGNOSIS — I78.1 SPIDER VEIN, SYMPTOMATIC: ICD-10-CM

## 2020-09-09 DIAGNOSIS — R94.6 BORDERLINE ABNORMAL TFTS: ICD-10-CM

## 2020-09-09 PROCEDURE — 99213 OFFICE O/P EST LOW 20 MIN: CPT | Mod: 25 | Performed by: INTERNAL MEDICINE

## 2020-09-09 PROCEDURE — 99396 PREV VISIT EST AGE 40-64: CPT | Mod: ZP | Performed by: INTERNAL MEDICINE

## 2020-09-09 PROCEDURE — G0463 HOSPITAL OUTPT CLINIC VISIT: HCPCS

## 2020-09-09 RX ORDER — LISINOPRIL 40 MG/1
40 TABLET ORAL DAILY
Qty: 90 TABLET | Refills: 3 | Status: SHIPPED | OUTPATIENT
Start: 2020-09-09 | End: 2021-09-20

## 2020-09-09 RX ORDER — ROSUVASTATIN CALCIUM 10 MG/1
10 TABLET, COATED ORAL DAILY
Qty: 90 TABLET | Refills: 3 | Status: SHIPPED | OUTPATIENT
Start: 2020-09-09 | End: 2021-09-20

## 2020-09-09 RX ORDER — HYDROCHLOROTHIAZIDE 25 MG/1
TABLET ORAL
Qty: 90 TABLET | Refills: 3 | Status: SHIPPED | OUTPATIENT
Start: 2020-09-09 | End: 2021-09-20

## 2020-09-09 ASSESSMENT — MIFFLIN-ST. JEOR: SCORE: 1147.92

## 2020-09-09 NOTE — PROGRESS NOTES
SUBJECTIVE:    CC: Paty Johnson is a 55 year old female who presents for:       Routine general medical examination at a health care facility  Spider vein, symptomatic  Hypertension goal BP (blood pressure) < 130/80  Hyperlipidemia LDL goal <130        PAST MEDICAL HISTORY:                  Past Medical History:   Diagnosis Date     Carcinoma in situ of cervix uteri 1993    treated with Effudex for 10 weeks     Irregular menstrual cycle     no infertility eval or problems     Unspecified essential hypertension 2007       PAST SURGICAL HISTORY:                  Past Surgical History:   Procedure Laterality Date     C NONSPECIFIC PROCEDURE  1993    laser ablation for cervix       CURRENT MEDICATIONS:                  Current Outpatient Medications   Medication Sig Dispense Refill     hydrochlorothiazide (HYDRODIURIL) 25 MG tablet TAKE 1 TABLET EVERY MORNING 90 tablet 3     lisinopril (ZESTRIL) 40 MG tablet Take 1 tablet (40 mg) by mouth daily 90 tablet 3     rosuvastatin (CRESTOR) 10 MG tablet Take 1 tablet (10 mg) by mouth daily 90 tablet 3       ALLERGIES:                  Allergies   Allergen Reactions     Nkda [No Known Drug Allergies]        SOCIAL HISTORY:                  Social History     Socioeconomic History     Marital status:      Spouse name: Mac     Number of children: 3     Years of education: 16     Highest education level: Not on file   Occupational History     Occupation:      Employer: NONE      Employer: HOMEMAKER   Social Needs     Financial resource strain: Not on file     Food insecurity     Worry: Not on file     Inability: Not on file     Transportation needs     Medical: Not on file     Non-medical: Not on file   Tobacco Use     Smoking status: Never Smoker     Smokeless tobacco: Never Used   Substance and Sexual Activity     Alcohol use: Yes     Comment: 1-2 drinks per week     Drug use: No     Sexual activity: Yes     Partners: Male     Birth  control/protection: Surgical     Comment:  had a vasectomy    Lifestyle     Physical activity     Days per week: Not on file     Minutes per session: Not on file     Stress: Not on file   Relationships     Social connections     Talks on phone: Not on file     Gets together: Not on file     Attends Faith service: Not on file     Active member of club or organization: Not on file     Attends meetings of clubs or organizations: Not on file     Relationship status: Not on file     Intimate partner violence     Fear of current or ex partner: Not on file     Emotionally abused: Not on file     Physically abused: Not on file     Forced sexual activity: Not on file   Other Topics Concern      Service Not Asked     Blood Transfusions Not Asked     Caffeine Concern Not Asked     Occupational Exposure Not Asked     Hobby Hazards Not Asked     Sleep Concern Not Asked     Stress Concern Not Asked     Weight Concern Not Asked     Special Diet Not Asked     Back Care Not Asked     Exercise Yes     Bike Helmet Not Asked     Seat Belt Yes     Self-Exams Not Asked     Parent/sibling w/ CABG, MI or angioplasty before 65F 55M? No   Social History Narrative     Not on file       FAMILY HISTORY:                   Family History   Problem Relation Age of Onset     Arthritis Sister         also has lupus     Respiratory Maternal Grandfather      Breast Cancer Paternal Grandmother      Cancer - colorectal Paternal Grandfather      Endocrine Disease Mother         prediabetes, neuropathy, diet controlled     Hypertension Father         takes meds     C.A.D. No family hx of      Cerebrovascular Disease No family hx of      Diabetes No family hx of              CONSTITUTIONAL: no malaise, fatigue, or other general symptoms  EYES: no subjective changes in visual acuity, no photophobia  ENT/MOUTH: no complaints of rhinorrhea, nasal congestion, sore throat, hearing changes  RESP: no SOB  CV: no c/o exertional chest pressure or  "QUIROZ  GI: No abdominal pain, constipation, change in bowel movements, nausea, pyrosis, BRBPR  : no polyuria or polydipsia, no dysuria, no gross hematuria  MUSCULOSKELETAL: no arthalgias or myalgias  INTEGUMENTARY/SKIN: no pruritis, rashes, or moles with recent change in size, shape, or pigmentation  BREAST: no lumps, masses, or discharges  NEURO: no gross sensory or motor symptoms, no dizziness, no confusion  ENDOCRINE: no polyuria or polydipsia, no heat or cold intolerance  HEME/ALLERGY/IMMUNE: no fevers, chills, night sweats, or unwanted weight loss  PSYCHIATRIC: no depression, anxiety, or internal stimuli    EXAM:    /78 (BP Location: Left arm, Patient Position: Chair, Cuff Size: Adult Regular)   Pulse 52   Resp 14   Ht 1.626 m (5' 4\")   Wt 55.8 kg (123 lb)   SpO2 97%   BMI 21.11 kg/m    BMI: Body mass index is 21.11 kg/m .    GENERAL APPEARANCE:healthy, alert and no distress    ORGAN EXAMS:               EYES: clear conjunctiva, no cataracts, no obvious fundoscopic abnormalities               HENT: oropharynx, nares, and TMs are WNL               NECK: no JVD, thyromegaly or lymphadenopathy, no cervical bruits               RESP: clear to auscultation without rales, wheezes, or rhonchi               CV: RRR, no murmurs, gallops, or rubs               LYMPH: no cervical , axillary, or inguinal lymphadenopathy appreciated               GI: NABS, ND/NT, no masses or organomegally appreciated               MS: no obvoius clinicallly relevant arthropathy, no evidence vasculitis               SKIN: no nevi clinically suspicious for malignancy are noted               NEURO: CN II-XII intact, no localizing sensory or motor abnoramlities noted, DTRs symmetrical bilaterally               PSYCH: Mental status exam reveals the pt to have normal mood and affect. There is no disorder of thought form or content. There is no response to internal stimuli. There is no suicidal or homicidal ideation.    Component      " Latest Ref Rng & Units 11/15/2019 8/20/2020   WBC      4.0 - 11.0 10e9/L  5.2   RBC Count      3.8 - 5.2 10e12/L  4.88   Hemoglobin      11.7 - 15.7 g/dL  14.9   Hematocrit      35.0 - 47.0 %  44.3   MCV      78 - 100 fl  91   MCH      26.5 - 33.0 pg  30.5   MCHC      31.5 - 36.5 g/dL  33.6   RDW      10.0 - 15.0 %  12.8   Platelet Count      150 - 450 10e9/L  263   % Neutrophils      %  65.5   % Lymphocytes      %  23.0   % Monocytes      %  10.0   % Eosinophils      %  1.5   % Basophils      %  0.0   Absolute Neutrophil      1.6 - 8.3 10e9/L  3.4   Absolute Lymphocytes      0.8 - 5.3 10e9/L  1.2   Absolute Monocytes      0.0 - 1.3 10e9/L  0.5   Absolute Eosinophils      0.0 - 0.7 10e9/L  0.1   Absolute Basophils      0.0 - 0.2 10e9/L  0.0   Diff Method        Automated Method   Total Cholesterol      <=199 mg/dL 152    Triglycerides      <150 mg/dL 64 53   HDL Cholesterol      40 - 59 mg/dL 50    LDL Cholesterol      <=129 mg/dL 89    HDL Size      >=8.9 nm 8.9    VLDL Size      <=46.7 nm 47.3 (H)    LDL Particle Size      >=20.7 nm 21.2    Lge HDL Particle number      >=4.2 umol/L 5.3    HDL Particle Number NMR      >=33.0 umol/L 35.1    Lge VLDL Part number      <=2.7 nmol/L <1.5    Small LDL Particle number      <=634 nmol/L 331    LDL Particle Number      <=1,135 nmol/L 842    EER LipoFit by NMR       SEE NOTE    Sodium      133 - 144 mmol/L  137   Potassium      3.4 - 5.3 mmol/L  4.2   Chloride      94 - 109 mmol/L  104   Carbon Dioxide      20 - 32 mmol/L  28   Anion Gap      3 - 14 mmol/L  5   Glucose      70 - 99 mg/dL  87   Urea Nitrogen      7 - 30 mg/dL  14   Creatinine      0.52 - 1.04 mg/dL  0.84   GFR Estimate      >60 mL/min/1.73:m2  79   GFR Estimate If Black      >60 mL/min/1.73:m2  >90   Calcium      8.5 - 10.1 mg/dL  9.7   Bilirubin Direct      0.0 - 0.2 mg/dL  0.3 (H)   Bilirubin Total      0.2 - 1.3 mg/dL  1.2   Albumin      3.4 - 5.0 g/dL  4.3   Protein Total      6.8 - 8.8 g/dL  8.3    Alkaline Phosphatase      40 - 150 U/L  33 (L)   ALT      0 - 50 U/L  20   AST      0 - 45 U/L  14   Cholesterol      <200 mg/dL  139   HDL Cholesterol      >49 mg/dL  64   LDL Cholesterol Calculated      <100 mg/dL  64   Non HDL Cholesterol      <130 mg/dL  75   Tissue Transglutaminase Antibody IgA      <7 U/mL 1    Tissue Transglutaminase Ying IgG      <7 U/mL 1    CRP Cardiac Risk      mg/L 0.9    Lipoprotein (a)      <=29 mg/dL <6    Hemoglobin A1C      0 - 5.6 %  5.1   TSH      0.40 - 4.00 mU/L  5.24 (H)   T4 Free      0.76 - 1.46 ng/dL  0.77   Free T3      2.3 - 4.2 pg/mL  2.5        ASSESSMENT/PLAN      (Z00.00) Routine general medical examination at a health care facility  (primary encounter diagnosis)  Comment: doing well, RTC one year  Plan: Follow-Up with Vascular Medicine, CBC with         platelets differential           (I78.1) Spider vein, symptomatic  Comment: resolved post ablation  Plan: Follow-Up with Vascular Medicine, OFFICE/OUTPT         VISIT,ESTLEVL III           (I10) Hypertension goal BP (blood pressure) < 130/80  Comment: at goal  Plan: hydrochlorothiazide (HYDRODIURIL) 25 MG tablet,        lisinopril (ZESTRIL) 40 MG tablet, Follow-Up         with Vascular Medicine, OFFICE/OUTPT         VISIT,EST,LEVL III           (E78.5) Hyperlipidemia LDL goal <130  Comment: at goal  Plan: rosuvastatin (CRESTOR) 10 MG tablet, Follow-Up         with Vascular Medicine, T3 Free, T4 free, TSH,         Basic metabolic panel, Hepatic panel, Lipid         Profile, Hemoglobin A1c, OFFICE/OUTPT         VISIT,EST,LEVL III           (R94.6) Borderline abnormal TFTs  Comment: asx,   other TFTs WNL, thyroid U/s and Abs WNL  Plan: annual surveillance  OFFICE/OUTPT VISIT,EST,LEVL III             Greater than one half the 15 minutes not spent on preventive care during this visit was spent providing aducation and counselling regarding item(s) # 2 onward as delineated above.                    I have discussed with  patient the risks, benefits, medications, treatment options and modalities.   I have instructed the patient to call or schedule a follow-up appointment if any problems or failure to improve.

## 2020-09-09 NOTE — PROGRESS NOTES
"Paty Johnson is a 55 year old female who presents for:  Chief Complaint   Patient presents with     RECHECK       Annual Physical. Last office visit 2/12/20.  Fasting labs in August.        Vitals:    Vitals:    09/09/20 1411 09/09/20 1413   BP: 132/75 123/78   BP Location: Right arm Left arm   Patient Position: Chair Chair   Cuff Size: Adult Regular Adult Regular   Pulse: 52    Resp: 14    SpO2: 97%    Weight: 123 lb (55.8 kg)    Height: 5' 4\" (1.626 m)        BMI:  Estimated body mass index is 21.11 kg/m  as calculated from the following:    Height as of this encounter: 5' 4\" (1.626 m).    Weight as of this encounter: 123 lb (55.8 kg).    Pain Score:  Data Unavailable        Janelle Camarena [facility]    "

## 2020-10-22 ENCOUNTER — ALLIED HEALTH/NURSE VISIT (OUTPATIENT)
Dept: FAMILY MEDICINE | Facility: CLINIC | Age: 55
End: 2020-10-22
Payer: COMMERCIAL

## 2020-10-22 DIAGNOSIS — Z23 NEED FOR PROPHYLACTIC VACCINATION AND INOCULATION AGAINST INFLUENZA: Primary | ICD-10-CM

## 2020-10-22 PROCEDURE — 90471 IMMUNIZATION ADMIN: CPT

## 2020-10-22 PROCEDURE — 99207 PR NO CHARGE NURSE ONLY: CPT

## 2020-10-22 PROCEDURE — 90682 RIV4 VACC RECOMBINANT DNA IM: CPT

## 2021-01-11 ASSESSMENT — ENCOUNTER SYMPTOMS
CHILLS: 0
SHORTNESS OF BREATH: 0
DYSURIA: 0
DIARRHEA: 0
HEADACHES: 0
MYALGIAS: 0
ABDOMINAL PAIN: 0
HEMATOCHEZIA: 0
FREQUENCY: 0
WEAKNESS: 0
HEARTBURN: 0
PARESTHESIAS: 0
JOINT SWELLING: 0
FEVER: 0
HEMATURIA: 0
NERVOUS/ANXIOUS: 0
SORE THROAT: 0
EYE PAIN: 0
BREAST MASS: 0
NAUSEA: 0
COUGH: 0
CONSTIPATION: 0
ARTHRALGIAS: 0
PALPITATIONS: 0
DIZZINESS: 0

## 2021-01-13 ENCOUNTER — OFFICE VISIT (OUTPATIENT)
Dept: FAMILY MEDICINE | Facility: CLINIC | Age: 56
End: 2021-01-13
Payer: COMMERCIAL

## 2021-01-13 ENCOUNTER — ANCILLARY PROCEDURE (OUTPATIENT)
Dept: MAMMOGRAPHY | Facility: CLINIC | Age: 56
End: 2021-01-13
Attending: FAMILY MEDICINE
Payer: COMMERCIAL

## 2021-01-13 VITALS
WEIGHT: 123 LBS | DIASTOLIC BLOOD PRESSURE: 68 MMHG | RESPIRATION RATE: 16 BRPM | OXYGEN SATURATION: 99 % | HEART RATE: 68 BPM | SYSTOLIC BLOOD PRESSURE: 116 MMHG | BODY MASS INDEX: 21 KG/M2 | HEIGHT: 64 IN | TEMPERATURE: 98 F

## 2021-01-13 DIAGNOSIS — Z12.31 VISIT FOR SCREENING MAMMOGRAM: ICD-10-CM

## 2021-01-13 DIAGNOSIS — Z00.00 ROUTINE GENERAL MEDICAL EXAMINATION AT A HEALTH CARE FACILITY: Primary | ICD-10-CM

## 2021-01-13 DIAGNOSIS — I10 HYPERTENSION GOAL BP (BLOOD PRESSURE) < 130/80: ICD-10-CM

## 2021-01-13 DIAGNOSIS — Z12.4 SCREENING FOR MALIGNANT NEOPLASM OF CERVIX: ICD-10-CM

## 2021-01-13 PROBLEM — I78.1 SPIDER VEIN, SYMPTOMATIC: Status: RESOLVED | Noted: 2020-02-12 | Resolved: 2021-01-13

## 2021-01-13 PROBLEM — M25.511 ACUTE PAIN OF RIGHT SHOULDER: Status: RESOLVED | Noted: 2018-02-09 | Resolved: 2021-01-13

## 2021-01-13 PROCEDURE — 77063 BREAST TOMOSYNTHESIS BI: CPT | Mod: TC | Performed by: RADIOLOGY

## 2021-01-13 PROCEDURE — 87624 HPV HI-RISK TYP POOLED RSLT: CPT | Performed by: FAMILY MEDICINE

## 2021-01-13 PROCEDURE — 77067 SCR MAMMO BI INCL CAD: CPT | Mod: TC | Performed by: RADIOLOGY

## 2021-01-13 PROCEDURE — G0145 SCR C/V CYTO,THINLAYER,RESCR: HCPCS | Performed by: FAMILY MEDICINE

## 2021-01-13 PROCEDURE — 99396 PREV VISIT EST AGE 40-64: CPT | Performed by: FAMILY MEDICINE

## 2021-01-13 ASSESSMENT — ENCOUNTER SYMPTOMS
CONSTIPATION: 0
CHILLS: 0
PALPITATIONS: 0
FREQUENCY: 0
WEAKNESS: 0
MYALGIAS: 0
DIZZINESS: 0
SHORTNESS OF BREATH: 0
DYSURIA: 0
HEARTBURN: 0
COUGH: 0
PARESTHESIAS: 0
NERVOUS/ANXIOUS: 0
ARTHRALGIAS: 0
HEMATURIA: 0
JOINT SWELLING: 0
ABDOMINAL PAIN: 0
HEMATOCHEZIA: 0
NAUSEA: 0
FEVER: 0
DIARRHEA: 0
EYE PAIN: 0
SORE THROAT: 0
HEADACHES: 0
BREAST MASS: 0

## 2021-01-13 ASSESSMENT — MIFFLIN-ST. JEOR: SCORE: 1143.95

## 2021-01-13 NOTE — PROGRESS NOTES
SUBJECTIVE:   CC: Paty Johnson is an 55 year old woman who presents for preventive health visit.       Patient has been advised of split billing requirements and indicates understanding: Yes  Healthy Habits:     Getting at least 3 servings of Calcium per day:  Yes    Bi-annual eye exam:  Yes    Dental care twice a year:  Yes    Sleep apnea or symptoms of sleep apnea:  None    Diet:  Gluten-free/reduced    Frequency of exercise:  6-7 days/week    Duration of exercise:  45-60 minutes    Taking medications regularly:  Yes    Medication side effects:  None    PHQ-2 Total Score: 0    Additional concerns today:  No      Had labs done by cardiovascular and normal. Dad also has HTN    Patient presents for evaluation of hypertension.  She indicates that she is feeling well and denies any symptoms referable to her elevated blood pressure. Specifically denies chest pain, palpitations, dyspnea, orthopnea, PND or peripheral edema. Current medication regimen is as listed below. Patient denies any side effects of medication.    Lisinopril 40mg and hydrochlorothiazide  25mg daily for years, started after pregnancy, 20 years ago        PROBLEMS TO ADD ON...    Today's PHQ-2 Score:   PHQ-2 ( 1999 [employer]) 1/11/2021   Q1: Little interest or pleasure in doing things 0   Q2: Feeling down, depressed or hopeless 0   PHQ-2 Score 0   Q1: Little interest or pleasure in doing things Not at all   Q2: Feeling down, depressed or hopeless Not at all   PHQ-2 Score 0       Abuse: Current or Past (Physical, Sexual or Emotional) - No  Do you feel safe in your environment? Yes        Social History     Tobacco Use     Smoking status: Never Smoker     Smokeless tobacco: Never Used   Substance Use Topics     Alcohol use: Yes     Comment: 1-2 drinks per week         Alcohol Use 1/11/2021   Prescreen: >3 drinks/day or >7 drinks/week? No   Prescreen: >3 drinks/day or >7 drinks/week? -       Reviewed orders with patient.  Reviewed health  maintenance and updated orders accordingly - Yes  BP Readings from Last 3 Encounters:   01/13/21 116/68   09/09/20 123/78   06/08/20 122/66    Wt Readings from Last 3 Encounters:   01/13/21 55.8 kg (123 lb)   09/09/20 55.8 kg (123 lb)   06/08/20 55.8 kg (123 lb)                  Recent Labs   Lab Test 08/20/20  0922 04/17/19  0824 07/30/18  0753 04/20/18  0816   A1C 5.1 4.8 4.9 4.8   LDL 64  --  128* 141*   HDL 64  --  64 52   TRIG 53  --  55 46   ALT 20  --  18 13   CR 0.84 0.79 0.84  --    GFRESTIMATED 79 86 71  --    GFRESTBLACK >90 >90 86  --    POTASSIUM 4.2 3.8 3.6  --    TSH 5.24* 6.74* 6.78* 6.54*        Mammogram Screening: Patient over age 50, mutual decision to screen reflected in health maintenance.    Pertinent mammograms are reviewed under the imaging tab.  History of abnormal Pap smear: NO - age 30-65 PAP every 5 years with negative HPV co-testing recommended  PAP / HPV Latest Ref Rng & Units 11/6/2019 9/13/2016 2/25/2014   PAP - NIL NIL NIL   HPV 16 DNA NEG:Negative Negative Negative -   HPV 18 DNA NEG:Negative Negative Negative -   OTHER HR HPV NEG:Negative Negative Negative -     Reviewed and updated as needed this visit by clinical staff  Tobacco  Allergies  Meds   Med Hx  Surg Hx  Fam Hx  Soc Hx        Reviewed and updated as needed this visit by Provider                    Review of Systems   Constitutional: Negative for chills and fever.   HENT: Negative for congestion, ear pain, hearing loss and sore throat.    Eyes: Negative for pain and visual disturbance.   Respiratory: Negative for cough and shortness of breath.    Cardiovascular: Negative for chest pain, palpitations and peripheral edema.   Gastrointestinal: Negative for abdominal pain, constipation, diarrhea, heartburn, hematochezia and nausea.   Breasts:  Negative for tenderness, breast mass and discharge.   Genitourinary: Negative for dysuria, frequency, genital sores, hematuria, pelvic pain, urgency, vaginal bleeding and vaginal  "discharge.   Musculoskeletal: Negative for arthralgias, joint swelling and myalgias.   Skin: Negative for rash.   Neurological: Negative for dizziness, weakness, headaches and paresthesias.   Psychiatric/Behavioral: Negative for mood changes. The patient is not nervous/anxious.           OBJECTIVE:   /68 (BP Location: Right arm, Patient Position: Sitting, Cuff Size: Adult Regular)   Pulse 68   Temp 98  F (36.7  C) (Oral)   Resp 16   Ht 1.619 m (5' 3.75\")   Wt 55.8 kg (123 lb)   SpO2 99%   BMI 21.28 kg/m    Physical Exam  GENERAL: healthy, alert and no distress  EYES: Eyes grossly normal to inspection, PERRL and conjunctivae and sclerae normal  HENT: ear canals and TM's normal, nose and mouth without ulcers or lesions  NECK: no adenopathy, no asymmetry, masses, or scars and thyroid normal to palpation  RESP: lungs clear to auscultation - no rales, rhonchi or wheezes  BREAST: normal without masses, tenderness or nipple discharge and no palpable axillary masses or adenopathy  CV: regular rate and rhythm, normal S1 S2, no S3 or S4, no murmur, click or rub, no peripheral edema and peripheral pulses strong  ABDOMEN: soft, nontender, no hepatosplenomegaly, no masses and bowel sounds normal   (female): normal female external genitalia, normal urethral meatus, vaginal mucosa pink, moist, well rugated, and normal cervix/adnexa/uterus without masses or discharge  MS: no gross musculoskeletal defects noted, no edema  SKIN: no suspicious lesions or rashes  NEURO: Normal strength and tone, mentation intact and speech normal  PSYCH: mentation appears normal, affect normal/bright    Diagnostic Test Results:  Labs reviewed in Epic    ASSESSMENT/PLAN:   1. Routine general medical examination at a health care facility  Normal exam today.    2. Hypertension goal BP (blood pressure) < 130/80  Well controlled, followed by cardiovascular surgeon for HTN and her lipid screening    3. Screening for malignant neoplasm of " "cervix   Pt thought she was due for pap, due to her hx of ascus, she has and normal for years, and she could get this to 5 yr screening, but due to concerns, will get this done today and will consider going to 5 yr pap testing in the future  - Pap imaged thin layer screen with HPV - recommended age 30 - 65 years (select HPV order below)  - HPV High Risk Types DNA Cervical    Patient has been advised of split billing requirements and indicates understanding: Yes  COUNSELING:  Reviewed preventive health counseling, as reflected in patient instructions       Regular exercise       Healthy diet/nutrition    Estimated body mass index is 21.28 kg/m  as calculated from the following:    Height as of this encounter: 1.619 m (5' 3.75\").    Weight as of this encounter: 55.8 kg (123 lb).    Weight management plan: Discussed healthy diet and exercise guidelines    She reports that she has never smoked. She has never used smokeless tobacco.      Counseling Resources:  ATP IV Guidelines  Pooled Cohorts Equation Calculator  Breast Cancer Risk Calculator  BRCA-Related Cancer Risk Assessment: FHS-7 Tool  FRAX Risk Assessment  ICSI Preventive Guidelines  Dietary Guidelines for Americans, 2010  USDA's MyPlate  ASA Prophylaxis  Lung CA Screening    Deonna Carrasquillo MD  [facility]  "

## 2021-01-15 LAB
COPATH REPORT: NORMAL
PAP: NORMAL

## 2021-04-21 ENCOUNTER — TRANSFERRED RECORDS (OUTPATIENT)
Dept: HEALTH INFORMATION MANAGEMENT | Facility: CLINIC | Age: 56
End: 2021-04-21

## 2021-09-01 ENCOUNTER — LAB (OUTPATIENT)
Dept: LAB | Facility: CLINIC | Age: 56
End: 2021-09-01
Payer: COMMERCIAL

## 2021-09-01 DIAGNOSIS — Z00.00 ROUTINE GENERAL MEDICAL EXAMINATION AT A HEALTH CARE FACILITY: ICD-10-CM

## 2021-09-01 DIAGNOSIS — E78.5 HYPERLIPIDEMIA LDL GOAL <130: ICD-10-CM

## 2021-09-01 DIAGNOSIS — R94.6 BORDERLINE ABNORMAL TFTS: ICD-10-CM

## 2021-09-01 LAB
ALBUMIN SERPL-MCNC: 4.1 G/DL (ref 3.4–5)
ALP SERPL-CCNC: 31 U/L (ref 40–150)
ALT SERPL W P-5'-P-CCNC: 22 U/L (ref 0–50)
ANION GAP SERPL CALCULATED.3IONS-SCNC: 6 MMOL/L (ref 3–14)
AST SERPL W P-5'-P-CCNC: 15 U/L (ref 0–45)
BASOPHILS # BLD AUTO: 0 10E3/UL (ref 0–0.2)
BASOPHILS NFR BLD AUTO: 0 %
BILIRUB DIRECT SERPL-MCNC: 0.3 MG/DL (ref 0–0.2)
BILIRUB SERPL-MCNC: 1.1 MG/DL (ref 0.2–1.3)
BUN SERPL-MCNC: 15 MG/DL (ref 7–30)
CALCIUM SERPL-MCNC: 9.4 MG/DL (ref 8.5–10.1)
CHLORIDE BLD-SCNC: 104 MMOL/L (ref 94–109)
CHOLEST SERPL-MCNC: 165 MG/DL
CO2 SERPL-SCNC: 27 MMOL/L (ref 20–32)
CREAT SERPL-MCNC: 0.92 MG/DL (ref 0.52–1.04)
CREAT UR-MCNC: 382 MG/DL
EOSINOPHIL # BLD AUTO: 0.1 10E3/UL (ref 0–0.7)
EOSINOPHIL NFR BLD AUTO: 1 %
ERYTHROCYTE [DISTWIDTH] IN BLOOD BY AUTOMATED COUNT: 13.5 % (ref 10–15)
FASTING STATUS PATIENT QL REPORTED: YES
GFR SERPL CREATININE-BSD FRML MDRD: 71 ML/MIN/1.73M2
GLUCOSE BLD-MCNC: 84 MG/DL (ref 70–99)
HBA1C MFR BLD: 4.8 % (ref 0–5.6)
HCT VFR BLD AUTO: 44.1 % (ref 35–47)
HDLC SERPL-MCNC: 71 MG/DL
HGB BLD-MCNC: 15 G/DL (ref 11.7–15.7)
LDLC SERPL CALC-MCNC: 79 MG/DL
LYMPHOCYTES # BLD AUTO: 1.5 10E3/UL (ref 0.8–5.3)
LYMPHOCYTES NFR BLD AUTO: 23 %
MCH RBC QN AUTO: 31.3 PG (ref 26.5–33)
MCHC RBC AUTO-ENTMCNC: 34 G/DL (ref 31.5–36.5)
MCV RBC AUTO: 92 FL (ref 78–100)
MICROALBUMIN UR-MCNC: 27 MG/L
MICROALBUMIN/CREAT UR: 7.07 MG/G CR (ref 0–25)
MONOCYTES # BLD AUTO: 0.7 10E3/UL (ref 0–1.3)
MONOCYTES NFR BLD AUTO: 10 %
NEUTROPHILS # BLD AUTO: 4.3 10E3/UL (ref 1.6–8.3)
NEUTROPHILS NFR BLD AUTO: 65 %
NONHDLC SERPL-MCNC: 94 MG/DL
PLATELET # BLD AUTO: 273 10E3/UL (ref 150–450)
POTASSIUM BLD-SCNC: 3.6 MMOL/L (ref 3.4–5.3)
PROT SERPL-MCNC: 8 G/DL (ref 6.8–8.8)
RBC # BLD AUTO: 4.8 10E6/UL (ref 3.8–5.2)
SODIUM SERPL-SCNC: 137 MMOL/L (ref 133–144)
T3FREE SERPL-MCNC: 2.8 PG/ML (ref 2.3–4.2)
T4 FREE SERPL-MCNC: 0.72 NG/DL (ref 0.76–1.46)
TRIGL SERPL-MCNC: 75 MG/DL
TSH SERPL DL<=0.005 MIU/L-ACNC: 4.3 MU/L (ref 0.4–4)
WBC # BLD AUTO: 6.6 10E3/UL (ref 4–11)

## 2021-09-01 PROCEDURE — 36415 COLL VENOUS BLD VENIPUNCTURE: CPT

## 2021-09-01 PROCEDURE — 84481 FREE ASSAY (FT-3): CPT

## 2021-09-01 PROCEDURE — 80050 GENERAL HEALTH PANEL: CPT

## 2021-09-01 PROCEDURE — 82043 UR ALBUMIN QUANTITATIVE: CPT

## 2021-09-01 PROCEDURE — 83036 HEMOGLOBIN GLYCOSYLATED A1C: CPT

## 2021-09-01 PROCEDURE — 84439 ASSAY OF FREE THYROXINE: CPT

## 2021-09-01 PROCEDURE — 82248 BILIRUBIN DIRECT: CPT

## 2021-09-01 PROCEDURE — 80061 LIPID PANEL: CPT

## 2021-09-18 ENCOUNTER — HEALTH MAINTENANCE LETTER (OUTPATIENT)
Age: 56
End: 2021-09-18

## 2021-09-20 ENCOUNTER — MYC MEDICAL ADVICE (OUTPATIENT)
Dept: OTHER | Facility: CLINIC | Age: 56
End: 2021-09-20

## 2021-09-20 DIAGNOSIS — E78.5 HYPERLIPIDEMIA LDL GOAL <130: ICD-10-CM

## 2021-09-20 DIAGNOSIS — I10 HYPERTENSION GOAL BP (BLOOD PRESSURE) < 130/80: ICD-10-CM

## 2021-09-20 RX ORDER — HYDROCHLOROTHIAZIDE 25 MG/1
TABLET ORAL
Qty: 90 TABLET | Refills: 0 | Status: SHIPPED | OUTPATIENT
Start: 2021-09-20 | End: 2021-10-13

## 2021-09-20 RX ORDER — LISINOPRIL 40 MG/1
40 TABLET ORAL DAILY
Qty: 90 TABLET | Refills: 0 | Status: SHIPPED | OUTPATIENT
Start: 2021-09-20 | End: 2021-10-13

## 2021-09-20 RX ORDER — ROSUVASTATIN CALCIUM 10 MG/1
10 TABLET, COATED ORAL DAILY
Qty: 90 TABLET | Refills: 0 | Status: SHIPPED | OUTPATIENT
Start: 2021-09-20 | End: 2021-10-13

## 2021-09-20 NOTE — TELEPHONE ENCOUNTER
hydrochlorothiazide (HYDRODIURIL) 25 MG tablet  Last Written Prescription Date:  9/9/20  Last Fill Quantity: 90,  # refills: 3    lisinopril (ZESTRIL) 40 MG tablet  Last Written Prescription Date:  9/9/20  Last Fill Quantity: 90,  # refills: 3    rosuvastatin (CRESTOR) 10 MG tablet  Last Written Prescription Date:  9/9/20  Last Fill Quantity: 90,  # refills: 3     90 day refill loaded for review/signature    Aria Sharpe RN BSN  [facility]  [phone]

## 2021-10-13 ENCOUNTER — OFFICE VISIT (OUTPATIENT)
Dept: OTHER | Facility: CLINIC | Age: 56
End: 2021-10-13
Attending: INTERNAL MEDICINE
Payer: COMMERCIAL

## 2021-10-13 VITALS
DIASTOLIC BLOOD PRESSURE: 87 MMHG | HEIGHT: 63 IN | HEART RATE: 60 BPM | OXYGEN SATURATION: 100 % | BODY MASS INDEX: 22.32 KG/M2 | RESPIRATION RATE: 16 BRPM | WEIGHT: 126 LBS | SYSTOLIC BLOOD PRESSURE: 138 MMHG

## 2021-10-13 DIAGNOSIS — R94.6 BORDERLINE ABNORMAL TFTS: ICD-10-CM

## 2021-10-13 DIAGNOSIS — E78.5 HYPERLIPIDEMIA LDL GOAL <130: ICD-10-CM

## 2021-10-13 DIAGNOSIS — I10 HYPERTENSION GOAL BP (BLOOD PRESSURE) < 130/80: ICD-10-CM

## 2021-10-13 DIAGNOSIS — Z00.00 ROUTINE GENERAL MEDICAL EXAMINATION AT A HEALTH CARE FACILITY: ICD-10-CM

## 2021-10-13 PROCEDURE — 99213 OFFICE O/P EST LOW 20 MIN: CPT | Mod: 25 | Performed by: INTERNAL MEDICINE

## 2021-10-13 PROCEDURE — 99396 PREV VISIT EST AGE 40-64: CPT | Performed by: INTERNAL MEDICINE

## 2021-10-13 PROCEDURE — G0463 HOSPITAL OUTPT CLINIC VISIT: HCPCS

## 2021-10-13 RX ORDER — ROSUVASTATIN CALCIUM 10 MG/1
10 TABLET, COATED ORAL DAILY
Qty: 90 TABLET | Refills: 3 | Status: SHIPPED | OUTPATIENT
Start: 2021-10-13 | End: 2022-10-03

## 2021-10-13 RX ORDER — HYDROCHLOROTHIAZIDE 25 MG/1
TABLET ORAL
Qty: 90 TABLET | Refills: 0 | Status: SHIPPED | OUTPATIENT
Start: 2021-10-13 | End: 2022-02-15

## 2021-10-13 RX ORDER — LISINOPRIL 40 MG/1
40 TABLET ORAL DAILY
Qty: 90 TABLET | Refills: 0 | Status: SHIPPED | OUTPATIENT
Start: 2021-10-13 | End: 2021-12-08

## 2021-10-13 ASSESSMENT — MIFFLIN-ST. JEOR: SCORE: 1140.66

## 2021-10-13 NOTE — PROGRESS NOTES
"[facility]        Patient is here for a follow up  to discuss about lab results      /87 (BP Location: Left arm, Patient Position: Chair, Cuff Size: Adult Regular)   Pulse 60   Resp 16   Ht 5' 3\" (1.6 m)   Wt 126 lb (57.2 kg)   SpO2 100%   BMI 22.32 kg/m      The provider has been notified that the patient has no concerns.     Questions patient would like addressed today are: N/A.    Refills are needed: No    Has homecare services and agency name:  Lorraine Camarena [facility]      "

## 2021-10-13 NOTE — PROGRESS NOTES
SUBJECTIVE:    CC: Paty Johnson is a 56 year old female who presents for:       Routine general medical examination at a health care facility  Spider vein, symptomatic  Hypertension goal BP (blood pressure) < 130/80  Hyperlipidemia LDL goal <130  Borderline abnormal TFTs    HPI: Paty Johnson is a 56 year old female who presents for f/u labs and meds. Her BP is elevated today.    PAST MEDICAL HISTORY:                  Past Medical History:   Diagnosis Date     Abnormal blood chemistry 8/26/2013     Problem list name updated by automated process. Provider to review     Acute pain of right shoulder 2/9/2018     Carcinoma in situ of cervix uteri 1993    treated with Effudex for 10 weeks     Irregular menstrual cycle     no infertility eval or problems     Spider vein, symptomatic 2/12/2020     Unspecified essential hypertension 2007       PAST SURGICAL HISTORY:                  Past Surgical History:   Procedure Laterality Date     BIOPSY  12/2000    right breast biopsy - benign     COLONOSCOPY  6/2018     ORTHOPEDIC SURGERY  1/2018    right labrum repair     ZZC NONSPECIFIC PROCEDURE  1993    laser ablation for cervix       CURRENT MEDICATIONS:                  Current Outpatient Medications   Medication Sig Dispense Refill     hydrochlorothiazide (HYDRODIURIL) 25 MG tablet TAKE 1 TABLET EVERY MORNING 90 tablet 0     lisinopril (ZESTRIL) 40 MG tablet Take 1 tablet (40 mg) by mouth daily 90 tablet 0     rosuvastatin (CRESTOR) 10 MG tablet Take 1 tablet (10 mg) by mouth daily 90 tablet 0       ALLERGIES:                  Allergies   Allergen Reactions     Nkda [No Known Drug Allergies]        SOCIAL HISTORY:                  Social History     Socioeconomic History     Marital status:      Spouse name: Mac     Number of children: 3     Years of education: 16     Highest education level: Not on file   Occupational History     Occupation:      Employer: NONE      Employer: [employer]    Tobacco Use     Smoking status: Never Smoker     Smokeless tobacco: Never Used   Substance and Sexual Activity     Alcohol use: Yes     Comment: 1-2 drinks per week     Drug use: No     Sexual activity: Yes     Partners: Male     Birth control/protection: Male Surgical     Comment:  had a vasectomy    Other Topics Concern      Service Not Asked     Blood Transfusions Not Asked     Caffeine Concern Not Asked     Occupational Exposure Not Asked     Hobby Hazards Not Asked     Sleep Concern Not Asked     Stress Concern Not Asked     Weight Concern Not Asked     Special Diet Not Asked     Back Care Not Asked     Exercise Yes     Bike Helmet Not Asked     Seat Belt Yes     Self-Exams Not Asked     Parent/sibling w/ CABG, MI or angioplasty before 65F 55M? No   Social History Narrative     Not on file     Social Determinants of Health     Financial Resource Strain:      Difficulty of Paying Living Expenses:    Food Insecurity:      Worried About Running Out of Food in the Last Year:      Ran Out of Food in the Last Year:    Transportation Needs:      Lack of Transportation (Medical):      Lack of Transportation (Non-Medical):    Physical Activity:      Days of Exercise per Week:      Minutes of Exercise per Session:    Stress:      Feeling of Stress :    Social Connections:      Frequency of Communication with Friends and Family:      Frequency of Social Gatherings with Friends and Family:      Attends Anabaptist Services:      Active Member of Clubs or Organizations:      Attends Club or Organization Meetings:      Marital Status:    Intimate Partner Violence:      Fear of Current or Ex-Partner:      Emotionally Abused:      Physically Abused:      Sexually Abused:        FAMILY HISTORY:                   Family History   Problem Relation Age of Onset     Arthritis Sister         also has lupus     Respiratory Maternal Grandfather      Breast Cancer Paternal Grandmother      Cancer - colorectal Paternal  "Grandfather      Endocrine Disease Mother         prediabetes, neuropathy, diet controlled     Diabetes Mother         borderline     Hypertension Father         takes meds     C.A.D. No family hx of      Cerebrovascular Disease No family hx of      Diabetes No family hx of              CONSTITUTIONAL: no malaise, fatigue, or other general symptoms  EYES: no subjective changes in visual acuity, no photophobia  ENT/MOUTH: no complaints of rhinorrhea, nasal congestion, sore throat, hearing changes  RESP: no SOB  CV: no c/o exertional chest pressure or QUIROZ  GI: No abdominal pain, constipation, change in bowel movements, nausea, pyrosis, BRBPR  : no polyuria or polydipsia, no dysuria, no gross hematuria  MUSCULOSKELETAL: no arthalgias or myalgias  INTEGUMENTARY/SKIN: no pruritis, rashes, or moles with recent change in size, shape, or pigmentation  BREAST: no lumps, masses, or discharges  NEURO: no gross sensory or motor symptoms, no dizziness, no confusion  ENDOCRINE: no polyuria or polydipsia, no heat or cold intolerance  HEME/ALLERGY/IMMUNE: no fevers, chills, night sweats, or unwanted weight loss  PSYCHIATRIC: no depression, anxiety, or internal stimuli    EXAM:    /87 (BP Location: Left arm, Patient Position: Chair, Cuff Size: Adult Regular)   Pulse 60   Resp 16   Ht 1.6 m (5' 3\")   Wt 57.2 kg (126 lb)   SpO2 100%   BMI 22.32 kg/m    BMI: Body mass index is 22.32 kg/m .    GENERAL APPEARANCE:healthy, alert and no distress    ORGAN EXAMS:               EYES: clear conjunctiva, no cataracts, no obvious fundoscopic abnormalities               HENT: oropharynx, nares, and TMs are WNL               NECK: no JVD, thyromegaly or lymphadenopathy, no cervical bruits               RESP: clear to auscultation without rales, wheezes, or rhonchi               CV: RRR, no murmurs, gallops, or rubs               LYMPH: no cervical , axillary, or inguinal lymphadenopathy appreciated               GI: NABS, ND/NT, no " masses or organomegally appreciated               MS: no obvoius clinicallly relevant arthropathy, no evidence vasculitis               SKIN: no nevi clinically suspicious for malignancy are noted               NEURO: CN II-XII intact, no localizing sensory or motor abnoramlities noted, DTRs symmetrical bilaterally               PSYCH: Mental status exam reveals the pt to have normal mood and affect. There is no disorder of thought form or content. There is no response to internal stimuli. There is no suicidal or homicidal ideation.     ASSESSMENT/PLAN    Component      Latest Ref Rng & Units 8/20/2020 9/1/2021   WBC      4.0 - 11.0 10e3/uL 5.2 6.6   RBC Count      3.80 - 5.20 10e6/uL 4.88 4.80   Hemoglobin      11.7 - 15.7 g/dL 14.9 15.0   Hematocrit      35.0 - 47.0 % 44.3 44.1   MCV      78 - 100 fL 91 92   MCH      26.5 - 33.0 pg 30.5 31.3   MCHC      31.5 - 36.5 g/dL 33.6 34.0   RDW      10.0 - 15.0 % 12.8 13.5   Platelet Count      150 - 450 10e3/uL 263 273   % Neutrophils      % 65.5 65   % Lymphocytes      % 23.0 23   % Monocytes      % 10.0 10   % Eosinophils      % 1.5 1   % Basophils      % 0.0 0   Absolute Neutrophil      1.6 - 8.3 10e9/L 3.4    Absolute Lymphocytes      0.8 - 5.3 10e9/L 1.2    Absolute Monocytes      0.0 - 1.3 10e9/L 0.5    Absolute Eosinophils      0.0 - 0.7 10e9/L 0.1    Absolute Basophils      0.0 - 0.2 10e9/L 0.0    Diff Method       Automated Method    Absolute Neutrophils      1.6 - 8.3 10e3/uL  4.3   Absolute Lymphocytes      0.8 - 5.3 10e3/uL  1.5   Absolute Monocytes      0.0 - 1.3 10e3/uL  0.7   Absolute Eosinophils      0.0 - 0.7 10e3/uL  0.1   Absolute Basophils      0.0 - 0.2 10e3/uL  0.0   Sodium      133 - 144 mmol/L 137 137   Potassium      3.4 - 5.3 mmol/L 4.2 3.6   Chloride      94 - 109 mmol/L 104 104   Carbon Dioxide      20 - 32 mmol/L 28 27   Anion Gap      3 - 14 mmol/L 5 6   Glucose      70 - 99 mg/dL 87 84   Urea Nitrogen      7 - 30 mg/dL 14 15   Creatinine       0.52 - 1.04 mg/dL 0.84 0.92   GFR Estimate      >60 mL/min/1.73m2 79 71   GFR Estimate If Black      >60 mL/min/1.73:m2 >90    Calcium      8.5 - 10.1 mg/dL 9.7 9.4   Bilirubin Direct      0.0 - 0.2 mg/dL 0.3 (H) 0.3 (H)   Bilirubin Total      0.2 - 1.3 mg/dL 1.2 1.1   Albumin      3.4 - 5.0 g/dL 4.3 4.1   Protein Total      6.8 - 8.8 g/dL 8.3 8.0   Alkaline Phosphatase      40 - 150 U/L 33 (L)    ALT      0 - 50 U/L 20 22   AST      0 - 45 U/L 14 15   Alkaline Phosphatase      40 - 150 U/L  31 (L)   Cholesterol      <200 mg/dL 139 165   Triglycerides      <150 mg/dL 53 75   HDL Cholesterol      >=50 mg/dL 64 71   LDL Cholesterol Calculated      <=100 mg/dL 64 79   Non HDL Cholesterol      <130 mg/dL 75 94   Patient Fasting > 8hrs?        Yes   Creatinine Urine      mg/dL  382   Albumin Urine mg/L      mg/L  27   Albumin Urine mg/g Cr      0.00 - 25.00 mg/g Cr  7.07   Hemoglobin A1C      0.0 - 5.6 % 5.1 4.8   TSH      0.40 - 4.00 mU/L 5.24 (H) 4.30 (H)   T4 Free      0.76 - 1.46 ng/dL 0.77 0.72 (L)   Free T3      2.3 - 4.2 pg/mL 2.5 2.8       ULTRASOUND THYROID   5/25/2018 10:38 AM     HISTORY: Thyroid function test abnormality.      COMPARISON: None.     FINDINGS: The thyroid gland is of normal size. The right lobe of the  thyroid measures 3.4 x 1.2 x 1.6 cm. The left lobe of the thyroid  measures 2.7 x 1 x 1.1 cm. The isthmus measures 0.3 cm. Background  thyroid parenchymal echogenicity is mildly heterogeneous. No discrete  thyroid nodules or masses are identified.                                                                      IMPRESSION: No discrete thyroid nodules or masses are seen.      THAO DAVIES MD      Component      Latest Ref Rng & Units 4/20/2018 8/20/2020 9/1/2021   Thyroid Peroxidase Antibody      <35 IU/mL 12     Thyroid Stim Immunog      <=1.3 TSI index <1.0     Thyroglobulin Antibody      <40 IU/mL <20           Component      Latest Ref Rng & Units 5/10/2007 10/1/2009 10/28/2010    TSH      0.40 - 4.00 mU/L 2.62 4.84 8.41 (H)   T4 Free      0.76 - 1.46 ng/dL 0.63 (L) 0.72 0.87   Tissue Transglutaminase Antibody IgA      <7 U/mL      Tissue Transglutaminase Ying IgG      <7 U/mL      Thyroid Stim Immunog      <=1.3 TSI index      Thyroid Peroxidase Antibody      <35 IU/mL      Triiodothyronine (T3)      60 - 181 ng/dL      Free T3      2.3 - 4.2 pg/mL        Component      Latest Ref Rng & Units 9/7/2011 10/5/2011 9/11/2012   TSH      0.40 - 4.00 mU/L 7.43 (H) 3.93 7.17 (H)   T4 Free      0.76 - 1.46 ng/dL 0.91 0.83 0.84   Tissue Transglutaminase Antibody IgA      <7 U/mL      Tissue Transglutaminase Ying IgG      <7 U/mL      Thyroid Stim Immunog      <=1.3 TSI index  <1.0 . . .    Thyroid Peroxidase Antibody      <35 IU/mL  11    Triiodothyronine (T3)      60 - 181 ng/dL  94    Free T3      2.3 - 4.2 pg/mL        Component      Latest Ref Rng & Units 9/18/2012 10/12/2012 8/19/2013   TSH      0.40 - 4.00 mU/L 2.52  8.82 (H)   T4 Free      0.76 - 1.46 ng/dL 0.91  0.77   Tissue Transglutaminase Antibody IgA      <7 U/mL      Tissue Transglutaminase Ying IgG      <7 U/mL      Thyroid Stim Immunog      <=1.3 TSI index TNP . . . <1.0 . . .    Thyroid Peroxidase Antibody      <35 IU/mL 11     Triiodothyronine (T3)      60 - 181 ng/dL 96     Free T3      2.3 - 4.2 pg/mL        Component      Latest Ref Rng & Units 7/2/2014 9/9/2015 3/8/2016 4/28/2017   TSH      0.40 - 4.00 mU/L 3.28 4.32 (H) 7.58 (H) 6.17 (H)   T4 Free      0.76 - 1.46 ng/dL 0.83 0.99 0.84 0.87   Tissue Transglutaminase Antibody IgA      <7 U/mL       Tissue Transglutaminase Ying IgG      <7 U/mL       Thyroid Stim Immunog      <=1.3 TSI index       Thyroid Peroxidase Antibody      <35 IU/mL       Triiodothyronine (T3)      60 - 181 ng/dL       Free T3      2.3 - 4.2 pg/mL   2.9 2.9     Component      Latest Ref Rng & Units 10/18/2017 4/20/2018 7/30/2018   TSH      0.40 - 4.00 mU/L 5.96 (H) 6.54 (H) 6.78 (H)   T4 Free      0.76 - 1.46  ng/dL 0.82 0.81 0.73 (L)   Tissue Transglutaminase Antibody IgA      <7 U/mL      Tissue Transglutaminase Ying IgG      <7 U/mL      Thyroid Stim Immunog      <=1.3 TSI index  <1.0    Thyroid Peroxidase Antibody      <35 IU/mL  12    Triiodothyronine (T3)      60 - 181 ng/dL      Free T3      2.3 - 4.2 pg/mL 2.9 2.8 2.6     Component      Latest Ref Rng & Units 4/17/2019 11/15/2019 8/20/2020   TSH      0.40 - 4.00 mU/L 6.74 (H)  5.24 (H)   T4 Free      0.76 - 1.46 ng/dL 0.84  0.77   Tissue Transglutaminase Antibody IgA      <7 U/mL  1    Tissue Transglutaminase Ying IgG      <7 U/mL  1    Thyroid Stim Immunog      <=1.3 TSI index      Thyroid Peroxidase Antibody      <35 IU/mL      Triiodothyronine (T3)      60 - 181 ng/dL      Free T3      2.3 - 4.2 pg/mL 3.0  2.5     Component      Latest Ref Rng & Units 9/1/2021   TSH      0.40 - 4.00 mU/L 4.30 (H)   T4 Free      0.76 - 1.46 ng/dL 0.72 (L)   Tissue Transglutaminase Antibody IgA      <7 U/mL    Tissue Transglutaminase Ying IgG      <7 U/mL    Thyroid Stim Immunog      <=1.3 TSI index    Thyroid Peroxidase Antibody      <35 IU/mL    Triiodothyronine (T3)      60 - 181 ng/dL    Free T3      2.3 - 4.2 pg/mL 2.8         A/P:      (Z00.00) Routine general medical examination at a health care facility  Comment: doing well, gyn and colon cancer screening per gyn and GI respectivley  Plan: CBC with platelets and differential,         Comprehensive metabolic panel (BMP + Alb, Alk         Phos, ALT, AST, Total. Bili, TP), Hemoglobin         A1c (aka HBA1C)               (I10) Hypertension goal BP (blood pressure) < 130/80  Comment: not at goal today. Check BP at home daily for next month, RTC if SBP > 130  Plan: hydrochlorothiazide (HYDRODIURIL) 25 MG tablet,        lisinopril (ZESTRIL) 40 MG tablet, OFFICE/OUTPT        VISIT,EST,LEVL III           (E78.5) Hyperlipidemia LDL goal <130  Comment: at goal  Plan: rosuvastatin (CRESTOR) 10 MG tablet, Lipid         panel reflex to  direct LDL Fasting,         OFFICE/OUTPT VISIT,EST,LEVL III        RTC one year.    (R94.6) Borderline abnormal TFTs  Comment: asx, Abs normal, no nodules on U/S  Plan: TSH, T4, free, T3 Free, Anti thyroglobulin         antibody, Thyroid peroxidase antibody, Thyroid         stimulating immunoglobulin, US Thyroid,         OFFICE/OUTPT VISIT,EST,LEVL III         20 minutes not spent on preventive care during this visit was spent providing medical care regarding item(s) # 2 onward as delineated above.                     I have discussed with patient the risks, benefits, medications, treatment options and modalities.   I have instructed the patient to call or schedule a follow-up appointment if any problems or failure to improve.

## 2021-10-15 NOTE — PROGRESS NOTES
"Paty Johnson is a 54 year old female who presents for:  Chief Complaint   Patient presents with     RECHECK     Follow up labs done 04/17/19 *Rescheduled from 06/14/19 *LMB 06/07/19        Vitals:    Vitals:    08/13/19 1007   BP: 109/71   BP Location: Right arm   Patient Position: Chair   Cuff Size: Adult Regular   Pulse: 63   Resp: 14   SpO2: 95%   Weight: 127 lb (57.6 kg)   Height: 5' 4\" (1.626 m)       BMI:  Estimated body mass index is 21.8 kg/m  as calculated from the following:    Height as of this encounter: 5' 4\" (1.626 m).    Weight as of this encounter: 127 lb (57.6 kg).    Pain Score:  Data Unavailable        Janelle Camarena    " [As Noted in HPI] : as noted in HPI [Negative] : Heme/Lymph

## 2021-12-08 DIAGNOSIS — I10 HYPERTENSION GOAL BP (BLOOD PRESSURE) < 130/80: ICD-10-CM

## 2021-12-08 RX ORDER — LISINOPRIL 40 MG/1
TABLET ORAL
Qty: 90 TABLET | Refills: 3 | Status: SHIPPED | OUTPATIENT
Start: 2021-12-08 | End: 2022-10-03

## 2021-12-08 NOTE — TELEPHONE ENCOUNTER
" lisinopril (ZESTRIL) 40 MG tablet     Last Written Prescription Date:  10/13/21  Last Fill Quantity: 90,  # refills: 0     Last office visit 10/13/21  Follow up due: September/October 2022    Requested Prescriptions   Pending Prescriptions Disp Refills     lisinopril (ZESTRIL) 40 MG tablet [Pharmacy Med Name: LISINOPRIL TAB 40MG] 90 tablet 3     Sig: TAKE 1 TABLET DAILY       ACE Inhibitors (Including Combos) Protocol Passed - 12/8/2021  4:06 PM        Passed - Blood pressure under 140/90 in past 12 months     BP Readings from Last 3 Encounters:   10/13/21 138/87   01/13/21 116/68   09/09/20 123/78                 Passed - Recent (12 mo) or future (30 days) visit within the authorizing provider's specialty     Patient has had an office visit with the authorizing provider or a provider within the authorizing providers department within the previous 12 mos or has a future within next 30 days. See \"Patient Info\" tab in inbasket, or \"Choose Columns\" in Meds & Orders section of the refill encounter.              Passed - Medication is active on med list        Passed - Patient is age 18 or older        Passed - No active pregnancy on record        Passed - Normal serum creatinine on file in past 12 months     Recent Labs   Lab Test 09/01/21  1021   CR 0.92       Ok to refill medication if creatinine is low          Passed - Normal serum potassium on file in past 12 months     Recent Labs   Lab Test 09/01/21  1021   POTASSIUM 3.6             Passed - No positive pregnancy test within past 12 months           Prescription approved per [facility] Refill Protocol.  Aria Sharpe RN BSN  [facility]  [phone]            "

## 2022-02-15 DIAGNOSIS — I10 HYPERTENSION GOAL BP (BLOOD PRESSURE) < 130/80: ICD-10-CM

## 2022-02-15 RX ORDER — HYDROCHLOROTHIAZIDE 25 MG/1
TABLET ORAL
Qty: 90 TABLET | Refills: 2 | Status: SHIPPED | OUTPATIENT
Start: 2022-02-15 | End: 2022-10-03

## 2022-02-15 NOTE — TELEPHONE ENCOUNTER
"hydrochlorothiazide (HYDRODIURIL) 25 MG tablet  Last Written Prescription Date:  10/13/21  Last Fill Quantity: 90,  # refills: 0     Last office visit: 10/13/2021   Follow up due: September/October 2022    Requested Prescriptions   Pending Prescriptions Disp Refills     hydrochlorothiazide (HYDRODIURIL) 25 MG tablet [Pharmacy Med Name: HYDROCHLOROT TAB 25MG] 90 tablet 2     Sig: TAKE 1 TABLET EVERY MORNING       Diuretics (Including Combos) Protocol Passed - 2/15/2022 10:36 AM        Passed - Blood pressure under 140/90 in past 12 months     BP Readings from Last 3 Encounters:   10/13/21 138/87   01/13/21 116/68   09/09/20 123/78                 Passed - Recent (12 mo) or future (30 days) visit within the authorizing provider's specialty     Patient has had an office visit with the authorizing provider or a provider within the authorizing providers department within the previous 12 mos or has a future within next 30 days. See \"Patient Info\" tab in inbasket, or \"Choose Columns\" in Meds & Orders section of the refill encounter.              Passed - Medication is active on med list        Passed - Patient is age 18 or older        Passed - No active pregancy on record        Passed - Normal serum creatinine on file in past 12 months     Recent Labs   Lab Test 09/01/21  1021   CR 0.92              Passed - Normal serum potassium on file in past 12 months     Recent Labs   Lab Test 09/01/21  1021   POTASSIUM 3.6                    Passed - Normal serum sodium on file in past 12 months     Recent Labs   Lab Test 09/01/21  1021                 Passed - No positive pregnancy test in past 12 months           Prescription approved per [facility] Refill Protocol.  Aria Sharpe RN BSN  [facility]  [phone]      "

## 2022-04-30 ENCOUNTER — HEALTH MAINTENANCE LETTER (OUTPATIENT)
Age: 57
End: 2022-04-30

## 2022-05-23 ENCOUNTER — ANCILLARY PROCEDURE (OUTPATIENT)
Dept: MAMMOGRAPHY | Facility: CLINIC | Age: 57
End: 2022-05-23
Payer: COMMERCIAL

## 2022-05-23 DIAGNOSIS — Z12.31 VISIT FOR SCREENING MAMMOGRAM: ICD-10-CM

## 2022-05-23 PROCEDURE — 77063 BREAST TOMOSYNTHESIS BI: CPT | Mod: TC | Performed by: RADIOLOGY

## 2022-05-23 PROCEDURE — 77067 SCR MAMMO BI INCL CAD: CPT | Mod: TC | Performed by: RADIOLOGY

## 2022-06-07 ENCOUNTER — OFFICE VISIT (OUTPATIENT)
Dept: FAMILY MEDICINE | Facility: CLINIC | Age: 57
End: 2022-06-07
Payer: COMMERCIAL

## 2022-06-07 VITALS — SYSTOLIC BLOOD PRESSURE: 118 MMHG | DIASTOLIC BLOOD PRESSURE: 70 MMHG

## 2022-06-07 DIAGNOSIS — Z85.41 HISTORY OF CERVICAL CANCER: Primary | ICD-10-CM

## 2022-06-07 PROCEDURE — 99207 PR NO CHARGE LOS: CPT | Performed by: PHYSICIAN ASSISTANT

## 2022-06-07 ASSESSMENT — ENCOUNTER SYMPTOMS
HEARTBURN: 0
NAUSEA: 0
EYE PAIN: 0
ABDOMINAL PAIN: 0
FREQUENCY: 0
PALPITATIONS: 0
SORE THROAT: 0
ARTHRALGIAS: 0
FEVER: 0
HEMATOCHEZIA: 0
CONSTIPATION: 0
PARESTHESIAS: 0
WEAKNESS: 0
DYSURIA: 0
NERVOUS/ANXIOUS: 0
DIARRHEA: 0
SHORTNESS OF BREATH: 0
MYALGIAS: 0
COUGH: 0
DIZZINESS: 0
CHILLS: 0
HEADACHES: 0
JOINT SWELLING: 0
HEMATURIA: 0

## 2022-06-07 NOTE — PROGRESS NOTES
Patient came in and was scheduled for a physical.  Has been getting routine physicals from her vascular medicine provider.    A few weeks ago she felt something on her cervix and scheduled this appointment.  She is no longer feeling this growth.  Reports a hx of cervical cancer.  Wondering about PAP/pelvic today.    Lab Results   Component Value Date    PAP NIL 01/13/2021    PAP NIL 11/06/2019    PAP NIL 09/13/2016     After discussion today she declined PAP/pelvic, not due for physical.  Will complete that with vascular medicine in the fall.    She can return at any time for a PAP.

## 2022-06-07 NOTE — PROGRESS NOTES
SUBJECTIVE:   CC: Paty Johnson is an 56 year old woman who presents for preventive health visit.     Patient has been advised of split billing requirements and indicates understanding: Yes       Healthy Habits:     Getting at least 3 servings of Calcium per day:  Yes    Bi-annual eye exam:  Yes    Dental care twice a year:  Yes    Sleep apnea or symptoms of sleep apnea:  None    Diet:  Gluten-free/reduced and Other    Frequency of exercise:  6-7 days/week    Duration of exercise:  Greater than 60 minutes    Taking medications regularly:  Yes    Medication side effects:  None    PHQ-2 Total Score: 0    Additional concerns today:  No      {Outside tests to abstract? :176806}    {additional problems to add (Optional):253248}    Today's PHQ-2 Score:   PHQ-2 ( 1999 [employer]) 6/7/2022   Q1: Little interest or pleasure in doing things 0   Q2: Feeling down, depressed or hopeless 0   PHQ-2 Score 0   PHQ-2 Total Score (12-17 Years)- Positive if 3 or more points; Administer PHQ-A if positive -   Q1: Little interest or pleasure in doing things Not at all   Q2: Feeling down, depressed or hopeless Not at all   PHQ-2 Score 0       Abuse: Current or Past (Physical, Sexual or Emotional) - No  Do you feel safe in your environment? Yes    Have you ever done Advance Care Planning? (For example, a Health Directive, POLST, or a discussion with a medical provider or your loved ones about your wishes): No, advance care planning information given to patient to review.  Patient declined advance care planning discussion at this time.    Social History     Tobacco Use     Smoking status: Never Smoker     Smokeless tobacco: Never Used   Substance Use Topics     Alcohol use: Yes     Comment: 1-2 drinks per week         Alcohol Use 6/7/2022   Prescreen: >3 drinks/day or >7 drinks/week? No   Prescreen: >3 drinks/day or >7 drinks/week? -       Reviewed orders with patient.  Reviewed health maintenance and updated orders accordingly - {  ":741930::\"Yes\"}  {Chronicprobdata (optional):447353}    Breast Cancer Screening:    Breast CA Risk Assessment (FHS-7) 6/7/2022   Do you have a family history of breast, colon, or ovarian cancer? No / Unknown       {If any of the questions to the BCRA (FHS-7) are answered yes, consider ordering referral for genetic counseling (Optional) :795633::\"click delete button to remove this line now\"}  {AMB Mammogram Decision Support (Optional) :786542}  Pertinent mammograms are reviewed under the imaging tab.    History of abnormal Pap smear: { :701833}  PAP / HPV Latest Ref Rng & Units 1/13/2021 11/6/2019 9/13/2016   PAP (Historical) - NIL NIL NIL   HPV16 NEG:Negative Negative Negative Negative   HPV18 NEG:Negative Negative Negative Negative   HRHPV NEG:Negative Negative Negative Negative     Reviewed and updated as needed this visit by clinical staff                    Reviewed and updated as needed this visit by Provider                   {HISTORY OPTIONS (Optional):193339}    Review of Systems   Constitutional: Negative for chills and fever.   HENT: Negative for congestion, ear pain, hearing loss and sore throat.    Eyes: Negative for pain and visual disturbance.   Respiratory: Negative for cough and shortness of breath.    Cardiovascular: Negative for chest pain, palpitations and peripheral edema.   Gastrointestinal: Negative for abdominal pain, constipation, diarrhea, heartburn, hematochezia and nausea.   Genitourinary: Negative for dysuria, frequency, genital sores, hematuria and urgency.   Musculoskeletal: Negative for arthralgias, joint swelling and myalgias.   Skin: Negative for rash.   Neurological: Negative for dizziness, weakness, headaches and paresthesias.   Psychiatric/Behavioral: Negative for mood changes. The patient is not nervous/anxious.      {FEMALE ROS (Optional):534844}     OBJECTIVE:   There were no vitals taken for this visit.  Physical Exam  {Exam Choices (Optional):021194}    {Diagnostic Test " "Results (Optional):311207::\"Diagnostic Test Results:\",\"Labs reviewed in Epic\"}    ASSESSMENT/PLAN:   {Diag Picklist:213711}    {Patient advised of split billing (Optional):910558}    COUNSELING:  {FEMALE COUNSELING MESSAGES:676552::\"Reviewed preventive health counseling, as reflected in patient instructions\"}    Estimated body mass index is 22.32 kg/m  as calculated from the following:    Height as of 10/13/21: 1.6 m (5' 3\").    Weight as of 10/13/21: 57.2 kg (126 lb).    {Weight Management Plan (ACO) Complete if BMI is abnormal-  Ages 18-64  BMI >24.9.  Age 65+ with BMI <23 or >30 (Optional):399017}    She reports that she has never smoked. She has never used smokeless tobacco.      Counseling Resources:  ATP IV Guidelines  Pooled Cohorts Equation Calculator  Breast Cancer Risk Calculator  BRCA-Related Cancer Risk Assessment: FHS-7 Tool  FRAX Risk Assessment  ICSI Preventive Guidelines  Dietary Guidelines for Americans, 2010  USDA's MyPlate  ASA Prophylaxis  Lung CA Screening    CHUCKIE Gomez [facility]  "

## 2022-09-23 ENCOUNTER — LAB (OUTPATIENT)
Dept: LAB | Facility: CLINIC | Age: 57
End: 2022-09-23
Payer: COMMERCIAL

## 2022-09-23 DIAGNOSIS — R94.6 BORDERLINE ABNORMAL TFTS: ICD-10-CM

## 2022-09-23 DIAGNOSIS — Z00.00 ROUTINE GENERAL MEDICAL EXAMINATION AT A HEALTH CARE FACILITY: ICD-10-CM

## 2022-09-23 DIAGNOSIS — E78.5 HYPERLIPIDEMIA LDL GOAL <130: ICD-10-CM

## 2022-09-23 LAB
ALBUMIN SERPL-MCNC: 4.7 G/DL (ref 3.4–5)
ALP SERPL-CCNC: 38 U/L (ref 40–150)
ALT SERPL W P-5'-P-CCNC: 20 U/L (ref 0–50)
ANION GAP SERPL CALCULATED.3IONS-SCNC: 3 MMOL/L (ref 3–14)
AST SERPL W P-5'-P-CCNC: 9 U/L (ref 0–45)
BASOPHILS # BLD AUTO: 0 10E3/UL (ref 0–0.2)
BASOPHILS NFR BLD AUTO: 0 %
BILIRUB SERPL-MCNC: 0.9 MG/DL (ref 0.2–1.3)
BUN SERPL-MCNC: 13 MG/DL (ref 7–30)
CALCIUM SERPL-MCNC: 10.2 MG/DL (ref 8.5–10.1)
CHLORIDE BLD-SCNC: 104 MMOL/L (ref 94–109)
CHOLEST SERPL-MCNC: 145 MG/DL
CO2 SERPL-SCNC: 32 MMOL/L (ref 20–32)
CREAT SERPL-MCNC: 0.8 MG/DL (ref 0.52–1.04)
EOSINOPHIL # BLD AUTO: 0.1 10E3/UL (ref 0–0.7)
EOSINOPHIL NFR BLD AUTO: 2 %
ERYTHROCYTE [DISTWIDTH] IN BLOOD BY AUTOMATED COUNT: 13.6 % (ref 10–15)
FASTING STATUS PATIENT QL REPORTED: YES
GFR SERPL CREATININE-BSD FRML MDRD: 87 ML/MIN/1.73M2
GLUCOSE BLD-MCNC: 98 MG/DL (ref 70–99)
HBA1C MFR BLD: 5.3 % (ref 0–5.6)
HCT VFR BLD AUTO: 41.5 % (ref 35–47)
HDLC SERPL-MCNC: 66 MG/DL
HGB BLD-MCNC: 14 G/DL (ref 11.7–15.7)
LDLC SERPL CALC-MCNC: 68 MG/DL
LYMPHOCYTES # BLD AUTO: 1.8 10E3/UL (ref 0.8–5.3)
LYMPHOCYTES NFR BLD AUTO: 39 %
MCH RBC QN AUTO: 30.5 PG (ref 26.5–33)
MCHC RBC AUTO-ENTMCNC: 33.7 G/DL (ref 31.5–36.5)
MCV RBC AUTO: 90 FL (ref 78–100)
MONOCYTES # BLD AUTO: 0.6 10E3/UL (ref 0–1.3)
MONOCYTES NFR BLD AUTO: 13 %
NEUTROPHILS # BLD AUTO: 2.1 10E3/UL (ref 1.6–8.3)
NEUTROPHILS NFR BLD AUTO: 45 %
NONHDLC SERPL-MCNC: 79 MG/DL
PLATELET # BLD AUTO: 253 10E3/UL (ref 150–450)
POTASSIUM BLD-SCNC: 3.5 MMOL/L (ref 3.4–5.3)
PROT SERPL-MCNC: 8.5 G/DL (ref 6.8–8.8)
RBC # BLD AUTO: 4.59 10E6/UL (ref 3.8–5.2)
SODIUM SERPL-SCNC: 139 MMOL/L (ref 133–144)
T3FREE SERPL-MCNC: 3.1 PG/ML (ref 2–4.4)
T4 FREE SERPL-MCNC: 0.86 NG/DL (ref 0.76–1.46)
TRIGL SERPL-MCNC: 55 MG/DL
TSH SERPL DL<=0.005 MIU/L-ACNC: 3.95 MU/L (ref 0.4–4)
WBC # BLD AUTO: 4.6 10E3/UL (ref 4–11)

## 2022-09-23 PROCEDURE — 36415 COLL VENOUS BLD VENIPUNCTURE: CPT

## 2022-09-23 PROCEDURE — 99000 SPECIMEN HANDLING OFFICE-LAB: CPT

## 2022-09-23 PROCEDURE — 86376 MICROSOMAL ANTIBODY EACH: CPT

## 2022-09-23 PROCEDURE — 80061 LIPID PANEL: CPT

## 2022-09-23 PROCEDURE — 84439 ASSAY OF FREE THYROXINE: CPT

## 2022-09-23 PROCEDURE — 84445 ASSAY OF TSI GLOBULIN: CPT | Mod: 90

## 2022-09-23 PROCEDURE — 84481 FREE ASSAY (FT-3): CPT

## 2022-09-23 PROCEDURE — 80050 GENERAL HEALTH PANEL: CPT

## 2022-09-23 PROCEDURE — 83036 HEMOGLOBIN GLYCOSYLATED A1C: CPT

## 2022-09-23 PROCEDURE — 86800 THYROGLOBULIN ANTIBODY: CPT

## 2022-09-26 LAB
THYROGLOB AB SERPL IA-ACNC: <20 IU/ML
THYROPEROXIDASE AB SERPL-ACNC: 24 IU/ML

## 2022-09-27 LAB — TSI SER-ACNC: <1 TSI INDEX

## 2022-10-03 ENCOUNTER — OFFICE VISIT (OUTPATIENT)
Dept: OTHER | Facility: CLINIC | Age: 57
End: 2022-10-03
Attending: INTERNAL MEDICINE
Payer: COMMERCIAL

## 2022-10-03 VITALS
OXYGEN SATURATION: 99 % | SYSTOLIC BLOOD PRESSURE: 118 MMHG | HEIGHT: 64 IN | BODY MASS INDEX: 21.31 KG/M2 | WEIGHT: 124.8 LBS | DIASTOLIC BLOOD PRESSURE: 73 MMHG | HEART RATE: 62 BPM

## 2022-10-03 DIAGNOSIS — E78.5 HYPERLIPIDEMIA LDL GOAL <130: ICD-10-CM

## 2022-10-03 DIAGNOSIS — Z00.00 ROUTINE GENERAL MEDICAL EXAMINATION AT A HEALTH CARE FACILITY: Primary | ICD-10-CM

## 2022-10-03 DIAGNOSIS — R94.6 BORDERLINE ABNORMAL TFTS: ICD-10-CM

## 2022-10-03 DIAGNOSIS — I10 HYPERTENSION GOAL BP (BLOOD PRESSURE) < 130/80: ICD-10-CM

## 2022-10-03 DIAGNOSIS — E83.52 HYPERCALCEMIA: ICD-10-CM

## 2022-10-03 PROCEDURE — 99213 OFFICE O/P EST LOW 20 MIN: CPT | Mod: 25 | Performed by: INTERNAL MEDICINE

## 2022-10-03 PROCEDURE — G0463 HOSPITAL OUTPT CLINIC VISIT: HCPCS

## 2022-10-03 PROCEDURE — 99396 PREV VISIT EST AGE 40-64: CPT | Performed by: INTERNAL MEDICINE

## 2022-10-03 RX ORDER — ROSUVASTATIN CALCIUM 10 MG/1
10 TABLET, COATED ORAL DAILY
Qty: 90 TABLET | Refills: 3 | Status: SHIPPED | OUTPATIENT
Start: 2022-10-03 | End: 2023-10-30

## 2022-10-03 RX ORDER — LISINOPRIL 40 MG/1
40 TABLET ORAL DAILY
Qty: 90 TABLET | Refills: 3 | Status: SHIPPED | OUTPATIENT
Start: 2022-10-03 | End: 2023-10-30

## 2022-10-03 RX ORDER — HYDROCHLOROTHIAZIDE 25 MG/1
25 TABLET ORAL EVERY MORNING
Qty: 90 TABLET | Refills: 3 | Status: SHIPPED | OUTPATIENT
Start: 2022-10-03 | End: 2023-10-30

## 2022-10-03 NOTE — PROGRESS NOTES
"Patient is here to discuss follow up.    /73 (BP Location: Right arm, Patient Position: Chair, Cuff Size: Adult Regular)   Pulse 62   Ht 5' 4\" (1.626 m)   Wt 124 lb 12.8 oz (56.6 kg)   SpO2 99%   BMI 21.42 kg/m      Questions patient would like addressed today are: N/A.    Refills are needed: Yes:  Lisinopril, Crestor, and Hydrochlorothiazide.    Has homecare services and agency name:  [facility] Est[facility]  "

## 2022-10-03 NOTE — PROGRESS NOTES
SUBJECTIVE:     CC: Paty Johnson is a 56 year old female who presents for:           Routine general medical examination at a health care facility  Hypertension goal BP (blood pressure) < 130/80  Hyperlipidemia LDL goal <130  Borderline abnormal TFTs  Hypercalcemia       HPI: Paty Johnson is a 56 year old female who presents for f/u labs and meds. Her BP is elevated today.     PAST MEDICAL HISTORY:                  Past Medical History        Past Medical History:   Diagnosis Date     Abnormal blood chemistry 8/26/2013      Problem list name updated by automated process. Provider to review     Acute pain of right shoulder 2/9/2018     Carcinoma in situ of cervix uteri 1993     treated with Effudex for 10 weeks     Irregular menstrual cycle       no infertility eval or problems     Spider vein, symptomatic 2/12/2020     Unspecified essential hypertension 2007            PAST SURGICAL HISTORY:                  Past Surgical History         Past Surgical History:   Procedure Laterality Date     BIOPSY   12/2000     right breast biopsy - benign     COLONOSCOPY   6/2018     ORTHOPEDIC SURGERY   1/2018     right labrum repair     ZZC NONSPECIFIC PROCEDURE   1993     laser ablation for cervix            CURRENT MEDICATIONS:                  Current Outpatient Prescriptions          Current Outpatient Medications   Medication Sig Dispense Refill     hydrochlorothiazide (HYDRODIURIL) 25 MG tablet TAKE 1 TABLET EVERY MORNING 90 tablet 0     lisinopril (ZESTRIL) 40 MG tablet Take 1 tablet (40 mg) by mouth daily 90 tablet 0     rosuvastatin (CRESTOR) 10 MG tablet Take 1 tablet (10 mg) by mouth daily 90 tablet 0            ALLERGIES:                       Allergies   Allergen Reactions     Nkda [No Known Drug Allergies]           SOCIAL HISTORY:                  Social History   Social History            Socioeconomic History     Marital status:        Spouse name: Mac     Number of children: 3     Years  of education: 16     Highest education level: Not on file   Occupational History     Occupation:        Employer: NONE        Employer: [employer]ER   Tobacco Use     Smoking status: Never Smoker     Smokeless tobacco: Never Used   Substance and Sexual Activity     Alcohol use: Yes       Comment: 1-2 drinks per week     Drug use: No     Sexual activity: Yes       Partners: Male       Birth control/protection: Male Surgical       Comment:  had a vasectomy    Other Topics Concern      Service Not Asked     Blood Transfusions Not Asked     Caffeine Concern Not Asked     Occupational Exposure Not Asked     Hobby Hazards Not Asked     Sleep Concern Not Asked     Stress Concern Not Asked     Weight Concern Not Asked     Special Diet Not Asked     Back Care Not Asked     Exercise Yes     Bike Helmet Not Asked     Seat Belt Yes     Self-Exams Not Asked     Parent/sibling w/ CABG, MI or angioplasty before 65F 55M? No   Social History Narrative     Not on file      Social Determinants of Health          Financial Resource Strain:      Difficulty of Paying Living Expenses:    Food Insecurity:      Worried About Running Out of Food in the Last Year:      Ran Out of Food in the Last Year:    Transportation Needs:      Lack of Transportation (Medical):      Lack of Transportation (Non-Medical):    Physical Activity:      Days of Exercise per Week:      Minutes of Exercise per Session:    Stress:      Feeling of Stress :    Social Connections:      Frequency of Communication with Friends and Family:      Frequency of Social Gatherings with Friends and Family:      Attends Yazdanism Services:      Active Member of Clubs or Organizations:      Attends Club or Organization Meetings:      Marital Status:    Intimate Partner Violence:      Fear of Current or Ex-Partner:      Emotionally Abused:      Physically Abused:      Sexually Abused:             FAMILY HISTORY:                   Family History        "  Family History   Problem Relation Age of Onset     Arthritis Sister           also has lupus     Respiratory Maternal Grandfather       Breast Cancer Paternal Grandmother       Cancer - colorectal Paternal Grandfather       Endocrine Disease Mother           prediabetes, neuropathy, diet controlled     Diabetes Mother           borderline     Hypertension Father           takes meds     C.A.D. No family hx of       Cerebrovascular Disease No family hx of       Diabetes No family hx of                       CONSTITUTIONAL: no malaise, fatigue, or other general symptoms  EYES: no subjective changes in visual acuity, no photophobia  ENT/MOUTH: no complaints of rhinorrhea, nasal congestion, sore throat, hearing changes  RESP: no SOB  CV: no c/o exertional chest pressure or QUIROZ  GI: No abdominal pain, constipation, change in bowel movements, nausea, pyrosis, BRBPR  : no polyuria or polydipsia, no dysuria, no gross hematuria  MUSCULOSKELETAL: no arthalgias or myalgias  INTEGUMENTARY/SKIN: no pruritis, rashes, or moles with recent change in size, shape, or pigmentation  BREAST: no lumps, masses, or discharges  NEURO: no gross sensory or motor symptoms, no dizziness, no confusion  ENDOCRINE: no polyuria or polydipsia, no heat or cold intolerance  HEME/ALLERGY/IMMUNE: no fevers, chills, night sweats, or unwanted weight loss  PSYCHIATRIC: no depression, anxiety, or internal stimuli     EXAM:     /87 (BP Location: Left arm, Patient Position: Chair, Cuff Size: Adult Regular)   Pulse 60   Resp 16   Ht 1.6 m (5' 3\")   Wt 57.2 kg (126 lb)   SpO2 100%   BMI 22.32 kg/m    BMI: Body mass index is 22.32 kg/m .     GENERAL APPEARANCE:healthy, alert and no distress    ORGAN EXAMS:               EYES: clear conjunctiva, no cataracts, no obvious fundoscopic abnormalities               HENT: oropharynx, nares, and TMs are WNL               NECK: no JVD, thyromegaly or lymphadenopathy, no cervical bruits               RESP: " clear to auscultation without rales, wheezes, or rhonchi               CV: RRR, no murmurs, gallops, or rubs               LYMPH: no cervical , axillary, or inguinal lymphadenopathy appreciated               GI: NABS, ND/NT, no masses or organomegally appreciated               MS: no obvoius clinicallly relevant arthropathy, no evidence vasculitis               SKIN: no nevi clinically suspicious for malignancy are noted               NEURO: CN II-XII intact, no localizing sensory or motor abnoramlities noted, DTRs symmetrical bilaterally               PSYCH: Mental status exam reveals the pt to have normal mood and affect. There is no disorder of thought form or content. There is no response to internal stimuli. There is no suicidal or homicidal ideation.      ASSESSMENT/PLAN     Component      Latest Ref Rng & Units 8/20/2020 9/1/2021   WBC      4.0 - 11.0 10e3/uL 5.2 6.6   RBC Count      3.80 - 5.20 10e6/uL 4.88 4.80   Hemoglobin      11.7 - 15.7 g/dL 14.9 15.0   Hematocrit      35.0 - 47.0 % 44.3 44.1   MCV      78 - 100 fL 91 92   MCH      26.5 - 33.0 pg 30.5 31.3   MCHC      31.5 - 36.5 g/dL 33.6 34.0   RDW      10.0 - 15.0 % 12.8 13.5   Platelet Count      150 - 450 10e3/uL 263 273   % Neutrophils      % 65.5 65   % Lymphocytes      % 23.0 23   % Monocytes      % 10.0 10   % Eosinophils      % 1.5 1   % Basophils      % 0.0 0   Absolute Neutrophil      1.6 - 8.3 10e9/L 3.4     Absolute Lymphocytes      0.8 - 5.3 10e9/L 1.2     Absolute Monocytes      0.0 - 1.3 10e9/L 0.5     Absolute Eosinophils      0.0 - 0.7 10e9/L 0.1     Absolute Basophils      0.0 - 0.2 10e9/L 0.0     Diff Method       Automated Method     Absolute Neutrophils      1.6 - 8.3 10e3/uL   4.3   Absolute Lymphocytes      0.8 - 5.3 10e3/uL   1.5   Absolute Monocytes      0.0 - 1.3 10e3/uL   0.7   Absolute Eosinophils      0.0 - 0.7 10e3/uL   0.1   Absolute Basophils      0.0 - 0.2 10e3/uL   0.0   Sodium      133 - 144 mmol/L 137 137    Potassium      3.4 - 5.3 mmol/L 4.2 3.6   Chloride      94 - 109 mmol/L 104 104   Carbon Dioxide      20 - 32 mmol/L 28 27   Anion Gap      3 - 14 mmol/L 5 6   Glucose      70 - 99 mg/dL 87 84   Urea Nitrogen      7 - 30 mg/dL 14 15   Creatinine      0.52 - 1.04 mg/dL 0.84 0.92   GFR Estimate      >60 mL/min/1.73m2 79 71   GFR Estimate If Black      >60 mL/min/1.73:m2 >90     Calcium      8.5 - 10.1 mg/dL 9.7 9.4   Bilirubin Direct      0.0 - 0.2 mg/dL 0.3 (H) 0.3 (H)   Bilirubin Total      0.2 - 1.3 mg/dL 1.2 1.1   Albumin      3.4 - 5.0 g/dL 4.3 4.1   Protein Total      6.8 - 8.8 g/dL 8.3 8.0   Alkaline Phosphatase      40 - 150 U/L 33 (L)     ALT      0 - 50 U/L 20 22   AST      0 - 45 U/L 14 15   Alkaline Phosphatase      40 - 150 U/L   31 (L)   Cholesterol      <200 mg/dL 139 165   Triglycerides      <150 mg/dL 53 75   HDL Cholesterol      >=50 mg/dL 64 71   LDL Cholesterol Calculated      <=100 mg/dL 64 79   Non HDL Cholesterol      <130 mg/dL 75 94   Patient Fasting > 8hrs?         Yes   Creatinine Urine      mg/dL   382   Albumin Urine mg/L      mg/L   27   Albumin Urine mg/g Cr      0.00 - 25.00 mg/g Cr   7.07   Hemoglobin A1C      0.0 - 5.6 % 5.1 4.8   TSH      0.40 - 4.00 mU/L 5.24 (H) 4.30 (H)   T4 Free      0.76 - 1.46 ng/dL 0.77 0.72 (L)   Free T3      2.3 - 4.2 pg/mL 2.5 2.8         ULTRASOUND THYROID   5/25/2018 10:38 AM     HISTORY: Thyroid function test abnormality.      COMPARISON: None.     FINDINGS: The thyroid gland is of normal size. The right lobe of the  thyroid measures 3.4 x 1.2 x 1.6 cm. The left lobe of the thyroid  measures 2.7 x 1 x 1.1 cm. The isthmus measures 0.3 cm. Background  thyroid parenchymal echogenicity is mildly heterogeneous. No discrete  thyroid nodules or masses are identified.                                                                      IMPRESSION: No discrete thyroid nodules or masses are seen.      THAO DAVIES MD        Component      Latest Ref Rng &  Units 4/20/2018 8/20/2020 9/1/2021   Thyroid Peroxidase Antibody      <35 IU/mL 12       Thyroid Stim Immunog      <=1.3 TSI index <1.0       Thyroglobulin Antibody      <40 IU/mL <20                Component      Latest Ref Rng & Units 5/10/2007 10/1/2009 10/28/2010   TSH      0.40 - 4.00 mU/L 2.62 4.84 8.41 (H)   T4 Free      0.76 - 1.46 ng/dL 0.63 (L) 0.72 0.87   Tissue Transglutaminase Antibody IgA      <7 U/mL         Tissue Transglutaminase Ying IgG      <7 U/mL         Thyroid Stim Immunog      <=1.3 TSI index         Thyroid Peroxidase Antibody      <35 IU/mL         Triiodothyronine (T3)      60 - 181 ng/dL         Free T3      2.3 - 4.2 pg/mL            Component      Latest Ref Rng & Units 9/7/2011 10/5/2011 9/11/2012   TSH      0.40 - 4.00 mU/L 7.43 (H) 3.93 7.17 (H)   T4 Free      0.76 - 1.46 ng/dL 0.91 0.83 0.84   Tissue Transglutaminase Antibody IgA      <7 U/mL         Tissue Transglutaminase Ying IgG      <7 U/mL         Thyroid Stim Immunog      <=1.3 TSI index   <1.0 . . .     Thyroid Peroxidase Antibody      <35 IU/mL   11     Triiodothyronine (T3)      60 - 181 ng/dL   94     Free T3      2.3 - 4.2 pg/mL            Component      Latest Ref Rng & Units 9/18/2012 10/12/2012 8/19/2013   TSH      0.40 - 4.00 mU/L 2.52   8.82 (H)   T4 Free      0.76 - 1.46 ng/dL 0.91   0.77   Tissue Transglutaminase Antibody IgA      <7 U/mL         Tissue Transglutaminase Ying IgG      <7 U/mL         Thyroid Stim Immunog      <=1.3 TSI index TNP . . . <1.0 . . .     Thyroid Peroxidase Antibody      <35 IU/mL 11       Triiodothyronine (T3)      60 - 181 ng/dL 96       Free T3      2.3 - 4.2 pg/mL            Component      Latest Ref Rng & Units 7/2/2014 9/9/2015 3/8/2016 4/28/2017   TSH      0.40 - 4.00 mU/L 3.28 4.32 (H) 7.58 (H) 6.17 (H)   T4 Free      0.76 - 1.46 ng/dL 0.83 0.99 0.84 0.87   Tissue Transglutaminase Antibody IgA      <7 U/mL           Tissue Transglutaminase Ying IgG      <7 U/mL           Thyroid  Stim Immunog      <=1.3 TSI index           Thyroid Peroxidase Antibody      <35 IU/mL           Triiodothyronine (T3)      60 - 181 ng/dL           Free T3      2.3 - 4.2 pg/mL     2.9 2.9      Component      Latest Ref Rng & Units 10/18/2017 4/20/2018 7/30/2018   TSH      0.40 - 4.00 mU/L 5.96 (H) 6.54 (H) 6.78 (H)   T4 Free      0.76 - 1.46 ng/dL 0.82 0.81 0.73 (L)   Tissue Transglutaminase Antibody IgA      <7 U/mL         Tissue Transglutaminase Ying IgG      <7 U/mL         Thyroid Stim Immunog      <=1.3 TSI index   <1.0     Thyroid Peroxidase Antibody      <35 IU/mL   12     Triiodothyronine (T3)      60 - 181 ng/dL         Free T3      2.3 - 4.2 pg/mL 2.9 2.8 2.6      Component      Latest Ref Rng & Units 4/17/2019 11/15/2019 8/20/2020   TSH      0.40 - 4.00 mU/L 6.74 (H)   5.24 (H)   T4 Free      0.76 - 1.46 ng/dL 0.84   0.77   Tissue Transglutaminase Antibody IgA      <7 U/mL   1     Tissue Transglutaminase Ying IgG      <7 U/mL   1     Thyroid Stim Immunog      <=1.3 TSI index         Thyroid Peroxidase Antibody      <35 IU/mL         Triiodothyronine (T3)      60 - 181 ng/dL         Free T3      2.3 - 4.2 pg/mL 3.0   2.5      Component      Latest Ref Rng & Units 9/1/2021   TSH      0.40 - 4.00 mU/L 4.30 (H)   T4 Free      0.76 - 1.46 ng/dL 0.72 (L)   Tissue Transglutaminase Antibody IgA      <7 U/mL     Tissue Transglutaminase Ying IgG      <7 U/mL     Thyroid Stim Immunog      <=1.3 TSI index     Thyroid Peroxidase Antibody      <35 IU/mL     Triiodothyronine (T3)      60 - 181 ng/dL     Free T3      2.3 - 4.2 pg/mL 2.8         Component      Latest Ref Rng & Units 9/23/2022   WBC      4.0 - 11.0 10e3/uL 4.6   RBC Count      3.80 - 5.20 10e6/uL 4.59   Hemoglobin      11.7 - 15.7 g/dL 14.0   Hematocrit      35.0 - 47.0 % 41.5   MCV      78 - 100 fL 90   MCH      26.5 - 33.0 pg 30.5   MCHC      31.5 - 36.5 g/dL 33.7   RDW      10.0 - 15.0 % 13.6   Platelet Count      150 - 450 10e3/uL 253   % Neutrophils       % 45   % Lymphocytes      % 39   % Monocytes      % 13   % Eosinophils      % 2   % Basophils      % 0   Absolute Neutrophils      1.6 - 8.3 10e3/uL 2.1   Absolute Lymphocytes      0.8 - 5.3 10e3/uL 1.8   Absolute Monocytes      0.0 - 1.3 10e3/uL 0.6   Absolute Eosinophils      0.0 - 0.7 10e3/uL 0.1   Absolute Basophils      0.0 - 0.2 10e3/uL 0.0   Sodium      133 - 144 mmol/L 139   Potassium      3.4 - 5.3 mmol/L 3.5   Chloride      94 - 109 mmol/L 104   Carbon Dioxide      20 - 32 mmol/L 32   Anion Gap      3 - 14 mmol/L 3   Urea Nitrogen      7 - 30 mg/dL 13   Creatinine      0.52 - 1.04 mg/dL 0.80   Calcium      8.5 - 10.1 mg/dL 10.2 (H)   Glucose      70 - 99 mg/dL 98   Alkaline Phosphatase      40 - 150 U/L 38 (L)   AST      0 - 45 U/L 9   ALT      0 - 50 U/L 20   Protein Total      6.8 - 8.8 g/dL 8.5   Albumin      3.4 - 5.0 g/dL 4.7   Bilirubin Total      0.2 - 1.3 mg/dL 0.9   GFR Estimate      >60 mL/min/1.73m2 87   Cholesterol      <200 mg/dL 145   Triglycerides      <150 mg/dL 55   HDL Cholesterol      >=50 mg/dL 66   LDL Cholesterol Calculated      <=100 mg/dL 68   Non HDL Cholesterol      <130 mg/dL 79   Patient Fasting > 8hrs?       Yes   TSH      0.40 - 4.00 mU/L 3.95   T4 Free      0.76 - 1.46 ng/dL 0.86   Free T3      2.0 - 4.4 pg/mL 3.1   Thyroglobulin Antibody      <40 IU/mL <20   Thyroid Peroxidase Antibody      <35 IU/mL 24   Thyroid Stim Immunog      <=1.3 TSI index <1.0   Hemoglobin A1C      0.0 - 5.6 % 5.3        A/P:        (Z00.00) Routine general medical examination at a health care facility  Comment: doing well, gyn and colon cancer screening per gyn and GI respectivley  Plan: CBC with platelets and differential,         Comprehensive metabolic panel (BMP + Alb, Alk         Phos, ALT, AST, Total. Bili, TP), Hemoglobin         A1c (aka HBA1C)  in one year, order at next virtual visit to f/u hypercalcemia.                   (I10) Hypertension goal BP (blood pressure) < 130/80  Comment:  at goal today.   Plan: hydrochlorothiazide (HYDRODIURIL) 25 MG tablet,        lisinopril (ZESTRIL) 40 MG tablet, OFFICE/OUTPT        VISIT,EST,LEVL III            (E78.5) Hyperlipidemia LDL goal <130  Comment: at goal  Plan: rosuvastatin (CRESTOR) 10 MG tablet, Lipid         panel reflex to direct LDL Fasting,  in one year, order at next virtual visit to f/u hypercalcemia.         OFFICE/OUTPT VISIT,EST,LEVL III        RTC one year.     (R94.6) Borderline abnormal TFTs  Comment: asx, Abs normal, no nodules on U/S, TFT abnls resolved  Plan: TSH, T4, free, T3 Free in one year, order at next virtual visit to f/u hypercalcemia.         OFFICE/OUTPT VISIT,EST,LEVL III        (E83.52) Hypercalcemia  Comment: asx, likely spurious, or perhaps due to HCTZ. Check the below, RTC two weeks later for a virtual visit.   Plan: Ionized Calcium, PTH Related Peptide Test,         Parathyroid Hormone Intact, Protein         Immunofixation Serum, OFFICE/OUTPT         VISIT,EST,LEVL III                     20 minutes not spent on preventive care during this visit was spent providing medical care regarding item(s) # 2 onward as delineated above.                     I have discussed with patient the risks, benefits, medications, treatment options and modalities.   I have instructed the patient to call or schedule a follow-up appointment if any problems or failure to improve.

## 2022-10-13 ENCOUNTER — LAB (OUTPATIENT)
Dept: LAB | Facility: CLINIC | Age: 57
End: 2022-10-13
Payer: COMMERCIAL

## 2022-10-13 DIAGNOSIS — E83.52 HYPERCALCEMIA: ICD-10-CM

## 2022-10-13 LAB
CA-I BLD-MCNC: 4.7 MG/DL (ref 4.4–5.2)
PTH-INTACT SERPL-MCNC: 37 PG/ML (ref 15–65)

## 2022-10-13 PROCEDURE — 82330 ASSAY OF CALCIUM: CPT

## 2022-10-13 PROCEDURE — 36415 COLL VENOUS BLD VENIPUNCTURE: CPT

## 2022-10-13 PROCEDURE — 83970 ASSAY OF PARATHORMONE: CPT

## 2022-10-13 PROCEDURE — 99000 SPECIMEN HANDLING OFFICE-LAB: CPT

## 2022-10-13 PROCEDURE — 86334 IMMUNOFIX E-PHORESIS SERUM: CPT

## 2022-10-13 PROCEDURE — 82542 COL CHROMOTOGRAPHY QUAL/QUAN: CPT | Mod: 90

## 2022-10-14 LAB — PROT PATTERN SERPL IFE-IMP: NORMAL

## 2022-10-20 ENCOUNTER — VIRTUAL VISIT (OUTPATIENT)
Dept: OTHER | Facility: CLINIC | Age: 57
End: 2022-10-20
Attending: INTERNAL MEDICINE
Payer: COMMERCIAL

## 2022-10-20 DIAGNOSIS — E83.52 HYPERCALCEMIA: Primary | ICD-10-CM

## 2022-10-20 PROCEDURE — 99213 OFFICE O/P EST LOW 20 MIN: CPT | Mod: 95 | Performed by: INTERNAL MEDICINE

## 2022-10-20 NOTE — PROGRESS NOTES
Payt Johnson is a 57 year old female who is presenting at the current time to discuss her diagnosi(es) of     Hypercalcemia        HPI: Paty Johnson is a 57 year old female who was recently found to have hypercalcemia . She is on a thiazide diuretic. Her ionized calcium is however normal, as is her SPEP, and her PTH-intact. Her PTH-rp is currently pending. She has no risk factors for malignancy. She has no sxs of hypercalcemia.            Review Of Systems  Skin: negative  Eyes: negative  Ears/Nose/Throat: negative  Respiratory: No shortness of breath, dyspnea on exertion, cough, or hemoptysis  Cardiovascular: negative  Gastrointestinal: negative  Genitourinary: negative  Musculoskeletal: negative  Neurologic: negative  Psychiatric: negative  Hematologic/Lymphatic/Immunologic: negative  Endocrine: negative    PAST MEDICAL HISTORY:                  Past Medical History:   Diagnosis Date     Abnormal blood chemistry 8/26/2013     Problem list name updated by automated process. Provider to review     Acute pain of right shoulder 2/9/2018     Carcinoma in situ of cervix uteri 1993    treated with Effudex for 10 weeks     Irregular menstrual cycle     no infertility eval or problems     Spider vein, symptomatic 2/12/2020     Unspecified essential hypertension 2007       PAST SURGICAL HISTORY:                  Past Surgical History:   Procedure Laterality Date     BIOPSY  12/2000    right breast biopsy - benign     COLONOSCOPY  6/2018     ORTHOPEDIC SURGERY  1/2018    right labrum repair     ZZC NONSPECIFIC PROCEDURE  1993    laser ablation for cervix       CURRENT MEDICATIONS:                  Current Outpatient Medications   Medication Sig Dispense Refill     hydrochlorothiazide (HYDRODIURIL) 25 MG tablet Take 1 tablet (25 mg) by mouth every morning 90 tablet 3     lisinopril (ZESTRIL) 40 MG tablet Take 1 tablet (40 mg) by mouth daily 90 tablet 3     rosuvastatin (CRESTOR) 10 MG tablet Take 1 tablet (10  mg) by mouth daily 90 tablet 3       ALLERGIES:                  Allergies   Allergen Reactions     Nkda [No Known Drug Allergies]        SOCIAL HISTORY:                  Social History     Socioeconomic History     Marital status:      Spouse name: Mac     Number of children: 3     Years of education: 16     Highest education level: Not on file   Occupational History     Occupation:      Employer: NONE      Employer: HOMEMAKER   Tobacco Use     Smoking status: Never     Smokeless tobacco: Never   Vaping Use     Vaping Use: Never used   Substance and Sexual Activity     Alcohol use: Yes     Comment: infrequently     Drug use: No     Sexual activity: Yes     Partners: Male     Birth control/protection: Male Surgical     Comment:  had a vasectomy    Other Topics Concern      Service Not Asked     Blood Transfusions Not Asked     Caffeine Concern Not Asked     Occupational Exposure Not Asked     Hobby Hazards Not Asked     Sleep Concern Not Asked     Stress Concern Not Asked     Weight Concern Not Asked     Special Diet Not Asked     Back Care Not Asked     Exercise Yes     Bike Helmet Not Asked     Seat Belt Yes     Self-Exams Not Asked     Parent/sibling w/ CABG, MI or angioplasty before 65F 55M? No   Social History Narrative     Not on file     Social Determinants of Health     Financial Resource Strain: Not on file   Food Insecurity: Not on file   Transportation Needs: Not on file   Physical Activity: Not on file   Stress: Not on file   Social Connections: Not on file   Intimate Partner Violence: Not on file   Housing Stability: Not on file       FAMILY HISTORY:                   Family History   Problem Relation Age of Onset     Arthritis Sister         also has lupus     Respiratory Maternal Grandfather      Breast Cancer Paternal Grandmother      Cancer - colorectal Paternal Grandfather      Endocrine Disease Mother         prediabetes, neuropathy, diet controlled     Diabetes  Mother         borderline     Hypertension Father         takes meds     C.A.D. No family hx of      Cerebrovascular Disease No family hx of      Diabetes No family hx of                Physical exam was not performed as it was a video visit        Component      Latest Ref Rng & Units 9/23/2022   Sodium      133 - 144 mmol/L 139   Potassium      3.4 - 5.3 mmol/L 3.5   Chloride      94 - 109 mmol/L 104   Carbon Dioxide      20 - 32 mmol/L 32   Anion Gap      3 - 14 mmol/L 3   Urea Nitrogen      7 - 30 mg/dL 13   Creatinine      0.52 - 1.04 mg/dL 0.80   Calcium      8.5 - 10.1 mg/dL 10.2 (H)   Glucose      70 - 99 mg/dL 98   Alkaline Phosphatase      40 - 150 U/L 38 (L)   AST      0 - 45 U/L 9   ALT      0 - 50 U/L 20   Protein Total      6.8 - 8.8 g/dL 8.5   Albumin      3.4 - 5.0 g/dL 4.7   Bilirubin Total      0.2 - 1.3 mg/dL 0.9   GFR Estimate      >60 mL/min/1.73m2 87   Calcium Ionized Whole Blood      4.4 - 5.2 mg/dL    Parathyroid Hormone Intact      15 - 65 pg/mL    Immunofixation ELP            Component      Latest Ref Rng & Units 10/13/2022   Sodium      133 - 144 mmol/L    Potassium      3.4 - 5.3 mmol/L    Chloride      94 - 109 mmol/L    Carbon Dioxide      20 - 32 mmol/L    Anion Gap      3 - 14 mmol/L    Urea Nitrogen      7 - 30 mg/dL    Creatinine      0.52 - 1.04 mg/dL    Calcium      8.5 - 10.1 mg/dL    Glucose      70 - 99 mg/dL    Alkaline Phosphatase      40 - 150 U/L    AST      0 - 45 U/L    ALT      0 - 50 U/L    Protein Total      6.8 - 8.8 g/dL    Albumin      3.4 - 5.0 g/dL    Bilirubin Total      0.2 - 1.3 mg/dL    GFR Estimate      >60 mL/min/1.73m2    Calcium Ionized Whole Blood      4.4 - 5.2 mg/dL 4.7   Parathyroid Hormone Intact      15 - 65 pg/mL 37   Immunofixation ELP       No monoclonal protein seen on immunofixation. Pathologic significance requires clinical correlation. Eric Anton M.D., Ph.D., Pathologist       A/P:    (E83.52) Hypercalcemia  (primary encounter  diagnosis)  Comment: Ionized calcium is normal.  Plan:  Remain on HCTZ. Await PTH-rp. We will call her with those results.             21 minutes total medical care on today's date. Total time included pre and post charting, interview of the patietn, formulation of the above plan and communication of the plan with the patietn and her .       Video start: 2:14 pm  Video end: 2:25 pm    Platform: [employer] / Go[employer]le

## 2022-10-20 NOTE — PROGRESS NOTES
Yin is a 57 year old who is being evaluated via a billable video visit.      How would you like to obtain your AVS? MyChart  If the video visit is dropped, the invitation should be resent by: Text to cell phone: [phone]  Will anyone else be joining your video visit? Lorraine Estrella

## 2022-11-03 LAB — PTH RELATED PROT SERPL-SCNC: 2.8 PMOL/L

## 2022-11-20 ENCOUNTER — HEALTH MAINTENANCE LETTER (OUTPATIENT)
Age: 57
End: 2022-11-20

## 2023-04-24 NOTE — TELEPHONE ENCOUNTER
hydrochlorothiazide (HYDRODIURIL) 25 MG tablet  Last Written Prescription Date:  2/12/20  Last Fill Quantity: 90,  # refills: 3     lisinopril (PRINIVIL/ZESTRIL) 40 MG tablet  Last Written Prescription Date:  2/12/20  Last Fill Quantity: 90,  # refills: 3     Last office visit: 2/12/2020     One year rx of both medications provided 2/12/20.  This request denied.  Aria Sharpe RN BSN  [facility]  [phone]            
2
No

## 2023-09-05 ENCOUNTER — PATIENT OUTREACH (OUTPATIENT)
Dept: CARE COORDINATION | Facility: CLINIC | Age: 58
End: 2023-09-05
Payer: COMMERCIAL

## 2023-09-19 ENCOUNTER — PATIENT OUTREACH (OUTPATIENT)
Dept: CARE COORDINATION | Facility: CLINIC | Age: 58
End: 2023-09-19
Payer: COMMERCIAL

## 2023-09-25 ENCOUNTER — LAB (OUTPATIENT)
Dept: LAB | Facility: CLINIC | Age: 58
End: 2023-09-25
Payer: COMMERCIAL

## 2023-09-25 DIAGNOSIS — Z00.00 ROUTINE GENERAL MEDICAL EXAMINATION AT A HEALTH CARE FACILITY: ICD-10-CM

## 2023-09-25 LAB
ALBUMIN SERPL BCG-MCNC: 5 G/DL (ref 3.5–5.2)
ALP SERPL-CCNC: 40 U/L (ref 35–104)
ALT SERPL W P-5'-P-CCNC: 13 U/L (ref 0–50)
ANION GAP SERPL CALCULATED.3IONS-SCNC: 12 MMOL/L (ref 7–15)
AST SERPL W P-5'-P-CCNC: 21 U/L (ref 0–45)
BASOPHILS # BLD AUTO: 0 10E3/UL (ref 0–0.2)
BASOPHILS NFR BLD AUTO: 0 %
BILIRUB DIRECT SERPL-MCNC: 0.2 MG/DL (ref 0–0.3)
BILIRUB SERPL-MCNC: 1 MG/DL
BUN SERPL-MCNC: 16.4 MG/DL (ref 6–20)
CALCIUM SERPL-MCNC: 10.4 MG/DL (ref 8.6–10)
CHLORIDE SERPL-SCNC: 101 MMOL/L (ref 98–107)
CHOLEST SERPL-MCNC: 169 MG/DL
CREAT SERPL-MCNC: 0.85 MG/DL (ref 0.51–0.95)
DEPRECATED HCO3 PLAS-SCNC: 28 MMOL/L (ref 22–29)
EGFRCR SERPLBLD CKD-EPI 2021: 80 ML/MIN/1.73M2
EOSINOPHIL # BLD AUTO: 0.2 10E3/UL (ref 0–0.7)
EOSINOPHIL NFR BLD AUTO: 4 %
ERYTHROCYTE [DISTWIDTH] IN BLOOD BY AUTOMATED COUNT: 12.1 % (ref 10–15)
GLUCOSE SERPL-MCNC: 95 MG/DL (ref 70–99)
HBA1C MFR BLD: 5.3 % (ref 0–5.6)
HCT VFR BLD AUTO: 43.3 % (ref 35–47)
HDLC SERPL-MCNC: 62 MG/DL
HGB BLD-MCNC: 15.2 G/DL (ref 11.7–15.7)
IMM GRANULOCYTES # BLD: 0 10E3/UL
IMM GRANULOCYTES NFR BLD: 0 %
LDLC SERPL CALC-MCNC: 94 MG/DL
LYMPHOCYTES # BLD AUTO: 1.3 10E3/UL (ref 0.8–5.3)
LYMPHOCYTES NFR BLD AUTO: 26 %
MCH RBC QN AUTO: 30.5 PG (ref 26.5–33)
MCHC RBC AUTO-ENTMCNC: 35.1 G/DL (ref 31.5–36.5)
MCV RBC AUTO: 87 FL (ref 78–100)
MONOCYTES # BLD AUTO: 0.5 10E3/UL (ref 0–1.3)
MONOCYTES NFR BLD AUTO: 11 %
NEUTROPHILS # BLD AUTO: 2.9 10E3/UL (ref 1.6–8.3)
NEUTROPHILS NFR BLD AUTO: 59 %
NONHDLC SERPL-MCNC: 107 MG/DL
PLATELET # BLD AUTO: 300 10E3/UL (ref 150–450)
POTASSIUM SERPL-SCNC: 4.4 MMOL/L (ref 3.4–5.3)
PROT SERPL-MCNC: 7.9 G/DL (ref 6.4–8.3)
RBC # BLD AUTO: 4.99 10E6/UL (ref 3.8–5.2)
SODIUM SERPL-SCNC: 141 MMOL/L (ref 136–145)
T3FREE SERPL-MCNC: 3.1 PG/ML (ref 2–4.4)
T4 FREE SERPL-MCNC: 1.04 NG/DL (ref 0.9–1.7)
TRIGL SERPL-MCNC: 64 MG/DL
TSH SERPL DL<=0.005 MIU/L-ACNC: 4.79 UIU/ML (ref 0.3–4.2)
WBC # BLD AUTO: 4.9 10E3/UL (ref 4–11)

## 2023-09-25 PROCEDURE — 80061 LIPID PANEL: CPT

## 2023-09-25 PROCEDURE — 83036 HEMOGLOBIN GLYCOSYLATED A1C: CPT

## 2023-09-25 PROCEDURE — 84439 ASSAY OF FREE THYROXINE: CPT

## 2023-09-25 PROCEDURE — 82248 BILIRUBIN DIRECT: CPT

## 2023-09-25 PROCEDURE — 84443 ASSAY THYROID STIM HORMONE: CPT

## 2023-09-25 PROCEDURE — 85025 COMPLETE CBC W/AUTO DIFF WBC: CPT

## 2023-09-25 PROCEDURE — 36415 COLL VENOUS BLD VENIPUNCTURE: CPT

## 2023-09-25 PROCEDURE — 80053 COMPREHEN METABOLIC PANEL: CPT

## 2023-09-25 PROCEDURE — 84481 FREE ASSAY (FT-3): CPT

## 2023-11-25 ENCOUNTER — HEALTH MAINTENANCE LETTER (OUTPATIENT)
Age: 58
End: 2023-11-25

## 2024-05-15 ENCOUNTER — OFFICE VISIT (OUTPATIENT)
Dept: URGENT CARE | Facility: URGENT CARE | Age: 59
End: 2024-05-15
Payer: COMMERCIAL

## 2024-05-15 VITALS
DIASTOLIC BLOOD PRESSURE: 74 MMHG | HEART RATE: 58 BPM | OXYGEN SATURATION: 98 % | BODY MASS INDEX: 20.6 KG/M2 | WEIGHT: 120 LBS | SYSTOLIC BLOOD PRESSURE: 118 MMHG | TEMPERATURE: 97.3 F

## 2024-05-15 DIAGNOSIS — S70.369A INSECT BITE OF THIGH, UNSPECIFIED LATERALITY, INITIAL ENCOUNTER: Primary | ICD-10-CM
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
## 2024-05-15 PROCEDURE — 99213 OFFICE O/P EST LOW 20 MIN: CPT | Performed by: FAMILY MEDICINE

## 2024-05-15 RX ORDER — TRIAMCINOLONE ACETONIDE 1 MG/G
CREAM TOPICAL 3 TIMES DAILY PRN
Qty: 45 G | Refills: 0 | Status: SHIPPED | OUTPATIENT
Start: 2024-05-15

## 2024-05-15 NOTE — PATIENT INSTRUCTIONS
Cetirizine (generic for Zyrtec) 10 mg daily during seasons when you're likely to encounter insects that bite.    Use triamcinolone cream topically 2-3 times per day as needed on the bites that need more itch control than just the OTC hydrocortisone.

## 2024-05-15 NOTE — PROGRESS NOTES
ICD-10-CM    1. Insect bite of thigh, unspecified laterality, initial encounter  S70.369A triamcinolone (KENALOG) 0.1 % external cream    W57.XXXA         Insect bites on thighs x 2 and arm x 1.  No evidence of bacterial infection at this time.  Appears to have a vigorous reaction to insect bites but no anaphylaxis.    PLAN:  Patient Instructions   Cetirizine (generic for Zyrtec) 10 mg daily during seasons when you're likely to encounter insects that bite.    Use triamcinolone cream topically 2-3 times per day as needed on the bites that need more itch control than just the OTC hydrocortisone.    SUBJECTIVE:  Paty Johnson is a 58 year old female who presents to  today with several insect bites that seem to be more red and swollen than most people's insect bites.  Her  was concerned and encouraged her to get checked out, but the patient says she's had reactions similar to this in the past on multiple occasions.  No trouble breathing.  No pustules that she's noted or pus draining from any of the bite sites.  She has tried some Benadryl and some OTC HC 1% cream, which help somewhat.    OBJECTIVE:  /74   Pulse 58   Temp 97.3  F (36.3  C) (Tympanic)   Wt 54.4 kg (120 lb)   SpO2 98%   BMI 20.60 kg/m    GEN: well-appearing, in NAD  Skin: insect bites noted R arm with about 3 cm patch of erythema, L thigh with about 5 cm patch of erythema, and R posterior leg with about 3 cm erythema.  No crusting.  No abscess formation.

## 2024-08-31 ENCOUNTER — HEALTH MAINTENANCE LETTER (OUTPATIENT)
Age: 59
End: 2024-08-31

## 2024-09-24 ENCOUNTER — TELEPHONE (OUTPATIENT)
Dept: OTHER | Facility: CLINIC | Age: 59
End: 2024-09-24
Payer: COMMERCIAL

## 2024-09-24 DIAGNOSIS — Z00.00 ROUTINE GENERAL MEDICAL EXAMINATION AT A HEALTH CARE FACILITY: Primary | ICD-10-CM

## 2024-09-24 NOTE — TELEPHONE ENCOUNTER
Routing to scheduling to coordinate the following:  Fasting labs   Please schedule this in first available  Schedule with Dr. Berg for 2 weeks after labs     Appt note:  annual follow up and labs    Thank you  Rodrigo Bliss RN on 9/24/2024 at 1:57 PM

## 2024-09-24 NOTE — TELEPHONE ENCOUNTER
Dr. Berg,    Pt is wanting to know if you want any labs or tests prior to her appt- has not been scheduled yet  LOV was 10/20/22     No active orders for labs or tests noted in chart    Thank you  Rodrigo Bliss RN on 9/24/2024 at 10:27 AM

## 2024-09-24 NOTE — TELEPHONE ENCOUNTER
[facility]    Who is the name of the provider?:  DOUG ELIAS   What is the location you see this provider at/preferred location?: [place]  Person calling / Facility: Paty Johnson  Phone number:  [phone]  Nurse call back needed:  ?     Reason for call:    Patient called to schedule an appointment with Dr. Elias.  There are no orders.  Please review and advise what needs to be scheduled.  Yin said that lab work is usually ordered before seeing Dr. Elias.    Yin and her  Mac schedule their appointments with Dr. Elias on the same day.    Pharmacy location:     [id number] - [facility] [address]  [address]  [place] - MAIL ORDER MAINT MEDS - NON-EPRESCRIBE  [facility] [address] - ONE [facility] AT PORTAL TO REGISTERED [place] SITES  Outside Imaging: n/a   Can we leave a detailed message on this number?  YES     9/24/2024, 10:05 AM

## 2024-10-11 ENCOUNTER — LAB (OUTPATIENT)
Dept: LAB | Facility: CLINIC | Age: 59
End: 2024-10-11
Payer: COMMERCIAL

## 2024-10-11 DIAGNOSIS — Z00.00 ROUTINE GENERAL MEDICAL EXAMINATION AT A HEALTH CARE FACILITY: ICD-10-CM

## 2024-10-11 LAB
ALBUMIN SERPL BCG-MCNC: 5 G/DL (ref 3.5–5.2)
ALP SERPL-CCNC: 40 U/L (ref 40–150)
ALT SERPL W P-5'-P-CCNC: 17 U/L (ref 0–50)
ANION GAP SERPL CALCULATED.3IONS-SCNC: 12 MMOL/L (ref 7–15)
APO A-I SERPL-MCNC: 7 MG/DL
AST SERPL W P-5'-P-CCNC: 18 U/L (ref 0–45)
BASOPHILS # BLD AUTO: 0 10E3/UL (ref 0–0.2)
BASOPHILS NFR BLD AUTO: 1 %
BILIRUB SERPL-MCNC: 1 MG/DL
BUN SERPL-MCNC: 18.6 MG/DL (ref 6–20)
CALCIUM SERPL-MCNC: 10.2 MG/DL (ref 8.8–10.4)
CHLORIDE SERPL-SCNC: 101 MMOL/L (ref 98–107)
CREAT SERPL-MCNC: 0.83 MG/DL (ref 0.51–0.95)
CRP SERPL HS-MCNC: 0.69 MG/L
EGFRCR SERPLBLD CKD-EPI 2021: 82 ML/MIN/1.73M2
EOSINOPHIL # BLD AUTO: 0.1 10E3/UL (ref 0–0.7)
EOSINOPHIL NFR BLD AUTO: 3 %
ERYTHROCYTE [DISTWIDTH] IN BLOOD BY AUTOMATED COUNT: 12.3 % (ref 10–15)
EST. AVERAGE GLUCOSE BLD GHB EST-MCNC: 100 MG/DL
GLUCOSE SERPL-MCNC: 98 MG/DL (ref 70–99)
HBA1C MFR BLD: 5.1 % (ref 0–5.6)
HCO3 SERPL-SCNC: 27 MMOL/L (ref 22–29)
HCT VFR BLD AUTO: 42.3 % (ref 35–47)
HGB BLD-MCNC: 14.6 G/DL (ref 11.7–15.7)
IMM GRANULOCYTES # BLD: 0 10E3/UL
IMM GRANULOCYTES NFR BLD: 0 %
LYMPHOCYTES # BLD AUTO: 1.4 10E3/UL (ref 0.8–5.3)
LYMPHOCYTES NFR BLD AUTO: 32 %
MCH RBC QN AUTO: 30.3 PG (ref 26.5–33)
MCHC RBC AUTO-ENTMCNC: 34.5 G/DL (ref 31.5–36.5)
MCV RBC AUTO: 88 FL (ref 78–100)
MONOCYTES # BLD AUTO: 0.5 10E3/UL (ref 0–1.3)
MONOCYTES NFR BLD AUTO: 11 %
NEUTROPHILS # BLD AUTO: 2.3 10E3/UL (ref 1.6–8.3)
NEUTROPHILS NFR BLD AUTO: 54 %
PLATELET # BLD AUTO: 272 10E3/UL (ref 150–450)
POTASSIUM SERPL-SCNC: 3.8 MMOL/L (ref 3.4–5.3)
PROT SERPL-MCNC: 8.2 G/DL (ref 6.4–8.3)
RBC # BLD AUTO: 4.82 10E6/UL (ref 3.8–5.2)
SODIUM SERPL-SCNC: 140 MMOL/L (ref 135–145)
T3FREE SERPL-MCNC: 3.2 PG/ML (ref 2–4.4)
T4 FREE SERPL-MCNC: 0.96 NG/DL (ref 0.9–1.7)
TSH SERPL DL<=0.005 MIU/L-ACNC: 5.56 UIU/ML (ref 0.3–4.2)
WBC # BLD AUTO: 4.2 10E3/UL (ref 4–11)

## 2024-10-11 PROCEDURE — 83036 HEMOGLOBIN GLYCOSYLATED A1C: CPT

## 2024-10-11 PROCEDURE — 36415 COLL VENOUS BLD VENIPUNCTURE: CPT

## 2024-10-11 PROCEDURE — 83695 ASSAY OF LIPOPROTEIN(A): CPT

## 2024-10-11 PROCEDURE — 84439 ASSAY OF FREE THYROXINE: CPT

## 2024-10-11 PROCEDURE — 86141 C-REACTIVE PROTEIN HS: CPT

## 2024-10-11 PROCEDURE — 84481 FREE ASSAY (FT-3): CPT

## 2024-10-11 PROCEDURE — 83704 LIPOPROTEIN BLD QUAN PART: CPT | Mod: 90

## 2024-10-11 PROCEDURE — 85025 COMPLETE CBC W/AUTO DIFF WBC: CPT

## 2024-10-11 PROCEDURE — 80053 COMPREHEN METABOLIC PANEL: CPT

## 2024-10-11 PROCEDURE — 84443 ASSAY THYROID STIM HORMONE: CPT

## 2024-10-11 PROCEDURE — 99000 SPECIMEN HANDLING OFFICE-LAB: CPT

## 2024-10-15 LAB
CHOLEST SERPL-MCNC: 187 MG/DL
HDL SERPL QN: 9 NM
HDL SERPL-SCNC: >41 UMOL/L
HDLC SERPL-MCNC: 63 MG/DL
HLD.LARGE SERPL-SCNC: 7.5 UMOL/L
LDL SERPL QN: 21 NM
LDL SERPL-SCNC: 1193 NMOL/L
LDL SMALL SERPL-SCNC: 453 NMOL/L
LDLC SERPL CALC-MCNC: 111 MG/DL
PATHOLOGY STUDY: ABNORMAL
TRIGL SERPL-MCNC: 65 MG/DL
VLDL LARGE SERPL-SCNC: <1.5 NMOL/L
VLDL SERPL QN: 45.4 NM

## 2024-10-16 ENCOUNTER — MYC REFILL (OUTPATIENT)
Dept: OTHER | Facility: CLINIC | Age: 59
End: 2024-10-16
Payer: COMMERCIAL

## 2024-10-16 DIAGNOSIS — E78.5 HYPERLIPIDEMIA LDL GOAL <130: ICD-10-CM

## 2024-10-16 DIAGNOSIS — I10 HYPERTENSION GOAL BP (BLOOD PRESSURE) < 130/80: ICD-10-CM

## 2024-10-17 RX ORDER — LISINOPRIL 40 MG/1
40 TABLET ORAL DAILY
Qty: 90 TABLET | Refills: 3 | Status: SHIPPED | OUTPATIENT
Start: 2024-10-17

## 2024-10-17 RX ORDER — HYDROCHLOROTHIAZIDE 25 MG/1
25 TABLET ORAL EVERY MORNING
Qty: 90 TABLET | Refills: 3 | Status: SHIPPED | OUTPATIENT
Start: 2024-10-17

## 2024-10-17 RX ORDER — ROSUVASTATIN CALCIUM 10 MG/1
10 TABLET, COATED ORAL DAILY
Qty: 90 TABLET | Refills: 3 | Status: SHIPPED | OUTPATIENT
Start: 2024-10-17

## 2024-10-17 NOTE — TELEPHONE ENCOUNTER
Patient is scheduled for follow up on   Future Appointments   Date Time Provider Department Center   10/28/2024  1:40 PM Casa Berg MD [facility]     Routing to provider to approve full 90 day supply, 3 refills as loaded.    Christie Santiago, JAGUARN, RN, CV-BC, [facility]

## 2024-10-28 ENCOUNTER — OFFICE VISIT (OUTPATIENT)
Dept: OTHER | Facility: CLINIC | Age: 59
End: 2024-10-28
Attending: INTERNAL MEDICINE
Payer: COMMERCIAL

## 2024-10-28 VITALS
SYSTOLIC BLOOD PRESSURE: 127 MMHG | OXYGEN SATURATION: 98 % | DIASTOLIC BLOOD PRESSURE: 82 MMHG | HEART RATE: 64 BPM | BODY MASS INDEX: 22.14 KG/M2 | WEIGHT: 129 LBS

## 2024-10-28 DIAGNOSIS — E78.5 HYPERLIPIDEMIA LDL GOAL <130: ICD-10-CM

## 2024-10-28 DIAGNOSIS — I10 HYPERTENSION GOAL BP (BLOOD PRESSURE) < 130/80: Primary | ICD-10-CM

## 2024-10-28 DIAGNOSIS — Z00.00 ROUTINE GENERAL MEDICAL EXAMINATION AT A HEALTH CARE FACILITY: ICD-10-CM

## 2024-10-28 PROCEDURE — 99213 OFFICE O/P EST LOW 20 MIN: CPT | Performed by: INTERNAL MEDICINE

## 2024-10-28 PROCEDURE — 99386 PREV VISIT NEW AGE 40-64: CPT | Performed by: INTERNAL MEDICINE

## 2024-10-28 PROCEDURE — 99213 OFFICE O/P EST LOW 20 MIN: CPT | Mod: 25 | Performed by: INTERNAL MEDICINE

## 2024-10-28 NOTE — PROGRESS NOTES
VASCULAR MEDICINE FOLLOW UP VISIT              A/P:        (Z00.00) Routine general medical examination at a health care facility  Comment: doing well, gyn and colon cancer screening per gyn and GI respectivley  Plan: CBC with platelets and differential,         Comprehensive metabolic panel (BMP + Alb, Alk         Phos, ALT, AST, Total. Bili, TP), Hemoglobin         A1c (aka HBA1C)  in one year, order at next virtual visit to f/u hypercalcemia.                   (I10) Hypertension goal BP (blood pressure) < 130/80  Comment: at goal today.   Plan: hydrochlorothiazide (HYDRODIURIL) 25 MG tablet,        lisinopril (ZESTRIL) 40 MG tablet, OFFICE/OUTPT        VISIT,EST,LEVL III            (E78.5) Hyperlipidemia LDL goal <130  Comment: at goal  Plan: rosuvastatin (CRESTOR) 10 MG tablet, Lipid         panel reflex to direct LDL Fasting,  in one year, order at next virtual visit to f/u hypercalcemia.         OFFICE/OUTPT VISIT,EST,LEVL III        RTC one year.     (R94.6) Borderline abnormal TFTs  Comment: asx, Abs normal, no nodules on U/S, TFT abnls resolved  Plan: TSH, T4, free, T3 Free in one year, order at next virtual visit to f/u hypercalcemia.         OFFICE/OUTPT VISIT,EST,LEVL III           (E83.52) Hypercalcemia  Comment: asx, likely spurious, or perhaps due to HCTZ. Check the below, RTC two weeks later for a virtual visit.   Plan: Ionized Calcium, PTH Related Peptide Test,         Parathyroid Hormone Intact, Protein         Immunofixation Serum, OFFICE/OUTPT         VISIT,EST,LEVL III                       23 minutes not spent on preventive care during this visit was spent providing medical care regarding item(s) # 2 onward as delineated above.                     I have discussed with patient the risks, benefits, medications, treatment options and modalities.         SUBJECTIVE:          HPI: Paty Johnson is a 59 year old female who presents for f/u labs and meds. Her BP is elevated today.     PAST  MEDICAL HISTORY:                  Past Medical History           Past Medical History:   Diagnosis Date    Abnormal blood chemistry 8/26/2013      Problem list name updated by automated process. Provider to review    Acute pain of right shoulder 2/9/2018    Carcinoma in situ of cervix uteri 1993     treated with Effudex for 10 weeks    Irregular menstrual cycle       no infertility eval or problems    Spider vein, symptomatic 2/12/2020    Unspecified essential hypertension 2007            PAST SURGICAL HISTORY:                  Past Surgical History             Past Surgical History:   Procedure Laterality Date    BIOPSY   12/2000     right breast biopsy - benign    COLONOSCOPY   6/2018    ORTHOPEDIC SURGERY   1/2018     right labrum repair    ZZC NONSPECIFIC PROCEDURE   1993     laser ablation for cervix            CURRENT MEDICATIONS:                  Current Outpatient Prescriptions               Current Outpatient Medications   Medication Sig Dispense Refill    hydrochlorothiazide (HYDRODIURIL) 25 MG tablet TAKE 1 TABLET EVERY MORNING 90 tablet 0    lisinopril (ZESTRIL) 40 MG tablet Take 1 tablet (40 mg) by mouth daily 90 tablet 0    rosuvastatin (CRESTOR) 10 MG tablet Take 1 tablet (10 mg) by mouth daily 90 tablet 0            ALLERGIES:                          Allergies   Allergen Reactions    Nkda [No Known Drug Allergies]           SOCIAL HISTORY:                  Social History   Social History                Socioeconomic History    Marital status:        Spouse name: Mac    Number of children: 3    Years of education: 16    Highest education level: Not on file   Occupational History    Occupation:        Employer: NONE        Employer: HOMEMAKER   Tobacco Use    Smoking status: Never Smoker    Smokeless tobacco: Never Used   Substance and Sexual Activity    Alcohol use: Yes       Comment: 1-2 drinks per week    Drug use: No    Sexual activity: Yes       Partners: Male       Birth  control/protection: Male Surgical       Comment:  had a vasectomy    Other Topics Concern     Service Not Asked    Blood Transfusions Not Asked    Caffeine Concern Not Asked    Occupational Exposure Not Asked    Hobby Hazards Not Asked    Sleep Concern Not Asked    Stress Concern Not Asked    Weight Concern Not Asked    Special Diet Not Asked    Back Care Not Asked    Exercise Yes    Bike Helmet Not Asked    Seat Belt Yes    Self-Exams Not Asked    Parent/sibling w/ CABG, MI or angioplasty before 65F 55M? No   Social History Narrative    Not on file      Social Determinants of Health            Financial Resource Strain:     Difficulty of Paying Living Expenses:    Food Insecurity:     Worried About Running Out of Food in the Last Year:     Ran Out of Food in the Last Year:    Transportation Needs:     Lack of Transportation (Medical):     Lack of Transportation (Non-Medical):    Physical Activity:     Days of Exercise per Week:     Minutes of Exercise per Session:    Stress:     Feeling of Stress :    Social Connections:     Frequency of Communication with Friends and Family:     Frequency of Social Gatherings with Friends and Family:     Attends Jew Services:     Active Member of Clubs or Organizations:     Attends Club or Organization Meetings:     Marital Status:    Intimate Partner Violence:     Fear of Current or Ex-Partner:     Emotionally Abused:     Physically Abused:     Sexually Abused:             FAMILY HISTORY:                   Family History             Family History   Problem Relation Age of Onset    Arthritis Sister           also has lupus    Respiratory Maternal Grandfather      Breast Cancer Paternal Grandmother      Cancer - colorectal Paternal Grandfather      Endocrine Disease Mother           prediabetes, neuropathy, diet controlled    Diabetes Mother           borderline    Hypertension Father           takes meds    C.A.D. No family hx of      Cerebrovascular Disease  No family hx of      Diabetes No family hx of                       CONSTITUTIONAL: no malaise, fatigue, or other general symptoms  EYES: no subjective changes in visual acuity, no photophobia  ENT/MOUTH: no complaints of rhinorrhea, nasal congestion, sore throat, hearing changes  RESP: no SOB  CV: no c/o exertional chest pressure or QUIROZ  GI: No abdominal pain, constipation, change in bowel movements, nausea, pyrosis, BRBPR  : no polyuria or polydipsia, no dysuria, no gross hematuria  MUSCULOSKELETAL: no arthalgias or myalgias  INTEGUMENTARY/SKIN: no pruritis, rashes, or moles with recent change in size, shape, or pigmentation  BREAST: no lumps, masses, or discharges  NEURO: no gross sensory or motor symptoms, no dizziness, no confusion  ENDOCRINE: no polyuria or polydipsia, no heat or cold intolerance  HEME/ALLERGY/IMMUNE: no fevers, chills, night sweats, or unwanted weight loss  PSYCHIATRIC: no depression, anxiety, or internal stimuli     EXAM:       /82 (BP Location: Right arm, Patient Position: Sitting, Cuff Size: Adult Regular)   Pulse 64   Wt 129 lb (58.5 kg)   SpO2 98%   BMI 22.14 kg/m         GENERAL APPEARANCE:healthy, alert and no distress    ORGAN EXAMS:               EYES: clear conjunctiva, no cataracts, no obvious fundoscopic abnormalities               HENT: oropharynx, nares, and TMs are WNL               NECK: no JVD, thyromegaly or lymphadenopathy, no cervical bruits               RESP: clear to auscultation without rales, wheezes, or rhonchi               CV: RRR, no murmurs, gallops, or rubs               LYMPH: no cervical , axillary, or inguinal lymphadenopathy appreciated               GI: NABS, ND/NT, no masses or organomegally appreciated               MS: no obvoius clinicallly relevant arthropathy, no evidence vasculitis               SKIN: no nevi clinically suspicious for malignancy are noted               NEURO: CN II-XII intact, no localizing sensory or motor abnoramlities  noted, DTRs symmetrical bilaterally               PSYCH: Mental status exam reveals the pt to have normal mood and affect. There is no disorder of thought form or content. There is no response to internal stimuli. There is no suicidal or homicidal ideation.             All relevant labs and imaging reviewed by myself on today's date.

## 2024-10-28 NOTE — PROGRESS NOTES
[facility]        Patient is here for a follow up.    Pt is currently taking Statin.    /82 (BP Location: Right arm, Patient Position: Sitting, Cuff Size: Adult Regular)   Pulse 64   Wt 129 lb (58.5 kg)   SpO2 98%   BMI 22.14 kg/m      The provider has been notified that the patient has no concerns.     Questions patient would like addressed today are: N/A.    Refills are needed: No    Has homecare services and agency name:  Lorraine Hills MA

## 2024-11-19 ENCOUNTER — PATIENT OUTREACH (OUTPATIENT)
Dept: CARE COORDINATION | Facility: CLINIC | Age: 59
End: 2024-11-19
Payer: COMMERCIAL

## 2025-01-20 ENCOUNTER — TRANSFERRED RECORDS (OUTPATIENT)
Dept: HEALTH INFORMATION MANAGEMENT | Facility: CLINIC | Age: 60
End: 2025-01-20
Payer: COMMERCIAL

## 2025-07-19 ENCOUNTER — HOSPITAL ENCOUNTER (EMERGENCY)
Facility: CLINIC | Age: 60
Discharge: HOME | End: 2025-07-19
Attending: STUDENT IN AN ORGANIZED HEALTH CARE EDUCATION/TRAINING PROGRAM | Admitting: STUDENT IN AN ORGANIZED HEALTH CARE EDUCATION/TRAINING PROGRAM
Payer: COMMERCIAL

## 2025-07-19 ENCOUNTER — APPOINTMENT (OUTPATIENT)
Dept: GENERAL RADIOLOGY | Facility: CLINIC | Age: 60
End: 2025-07-19
Attending: STUDENT IN AN ORGANIZED HEALTH CARE EDUCATION/TRAINING PROGRAM
Payer: COMMERCIAL

## 2025-07-19 VITALS
HEART RATE: 74 BPM | SYSTOLIC BLOOD PRESSURE: 157 MMHG | DIASTOLIC BLOOD PRESSURE: 96 MMHG | RESPIRATION RATE: 18 BRPM | WEIGHT: 127.87 LBS | OXYGEN SATURATION: 97 % | TEMPERATURE: 98.3 F | BODY MASS INDEX: 21.95 KG/M2

## 2025-07-19 DIAGNOSIS — R06.02 SHORTNESS OF BREATH: ICD-10-CM

## 2025-07-19 DIAGNOSIS — R05.1 ACUTE COUGH: Primary | ICD-10-CM

## 2025-07-19 DIAGNOSIS — M54.6 ACUTE RIGHT-SIDED THORACIC BACK PAIN: ICD-10-CM

## 2025-07-19 DIAGNOSIS — R94.31 T WAVE INVERSION IN EKG: ICD-10-CM

## 2025-07-19 DIAGNOSIS — I10 ELEVATED BLOOD PRESSURE READING WITH DIAGNOSIS OF HYPERTENSION: ICD-10-CM

## 2025-07-19 LAB
ANION GAP SERPL CALCULATED.3IONS-SCNC: 12 MMOL/L (ref 7–15)
BASOPHILS # BLD AUTO: 0 10E3/UL (ref 0–0.2)
BASOPHILS NFR BLD AUTO: 0 %
BUN SERPL-MCNC: 17.5 MG/DL (ref 6–20)
CALCIUM SERPL-MCNC: 10 MG/DL (ref 8.8–10.4)
CHLORIDE SERPL-SCNC: 98 MMOL/L (ref 98–107)
CREAT SERPL-MCNC: 0.71 MG/DL (ref 0.51–0.95)
D DIMER PPP FEU-MCNC: 0.4 UG/ML FEU (ref 0–0.5)
EGFRCR SERPLBLD CKD-EPI 2021: >90 ML/MIN/1.73M2
EOSINOPHIL # BLD AUTO: 0.1 10E3/UL (ref 0–0.7)
EOSINOPHIL NFR BLD AUTO: 1 %
ERYTHROCYTE [DISTWIDTH] IN BLOOD BY AUTOMATED COUNT: 12.1 % (ref 10–15)
GLUCOSE SERPL-MCNC: 122 MG/DL (ref 70–99)
HCO3 SERPL-SCNC: 27 MMOL/L (ref 22–29)
HCT VFR BLD AUTO: 40.5 % (ref 35–47)
HGB BLD-MCNC: 14.2 G/DL (ref 11.7–15.7)
IMM GRANULOCYTES # BLD: 0 10E3/UL
IMM GRANULOCYTES NFR BLD: 0 %
LYMPHOCYTES # BLD AUTO: 1.2 10E3/UL (ref 0.8–5.3)
LYMPHOCYTES NFR BLD AUTO: 14 %
MCH RBC QN AUTO: 30.6 PG (ref 26.5–33)
MCHC RBC AUTO-ENTMCNC: 35.1 G/DL (ref 31.5–36.5)
MCV RBC AUTO: 87 FL (ref 78–100)
MONOCYTES # BLD AUTO: 0.8 10E3/UL (ref 0–1.3)
MONOCYTES NFR BLD AUTO: 9 %
NEUTROPHILS # BLD AUTO: 7 10E3/UL (ref 1.6–8.3)
NEUTROPHILS NFR BLD AUTO: 76 %
NRBC # BLD AUTO: 0 10E3/UL
NRBC BLD AUTO-RTO: 0 /100
PLATELET # BLD AUTO: 290 10E3/UL (ref 150–450)
POTASSIUM SERPL-SCNC: 3.6 MMOL/L (ref 3.4–5.3)
RBC # BLD AUTO: 4.64 10E6/UL (ref 3.8–5.2)
SODIUM SERPL-SCNC: 137 MMOL/L (ref 135–145)
TROPONIN T SERPL HS-MCNC: <6 NG/L
WBC # BLD AUTO: 9.1 10E3/UL (ref 4–11)

## 2025-07-19 PROCEDURE — 85379 FIBRIN DEGRADATION QUANT: CPT | Performed by: STUDENT IN AN ORGANIZED HEALTH CARE EDUCATION/TRAINING PROGRAM

## 2025-07-19 PROCEDURE — 85004 AUTOMATED DIFF WBC COUNT: CPT | Performed by: STUDENT IN AN ORGANIZED HEALTH CARE EDUCATION/TRAINING PROGRAM

## 2025-07-19 PROCEDURE — 80048 BASIC METABOLIC PNL TOTAL CA: CPT | Performed by: STUDENT IN AN ORGANIZED HEALTH CARE EDUCATION/TRAINING PROGRAM

## 2025-07-19 PROCEDURE — 71046 X-RAY EXAM CHEST 2 VIEWS: CPT

## 2025-07-19 PROCEDURE — 93005 ELECTROCARDIOGRAM TRACING: CPT

## 2025-07-19 PROCEDURE — 36415 COLL VENOUS BLD VENIPUNCTURE: CPT | Performed by: STUDENT IN AN ORGANIZED HEALTH CARE EDUCATION/TRAINING PROGRAM

## 2025-07-19 PROCEDURE — 99285 EMERGENCY DEPT VISIT HI MDM: CPT | Mod: 25 | Performed by: STUDENT IN AN ORGANIZED HEALTH CARE EDUCATION/TRAINING PROGRAM

## 2025-07-19 PROCEDURE — 84484 ASSAY OF TROPONIN QUANT: CPT | Performed by: STUDENT IN AN ORGANIZED HEALTH CARE EDUCATION/TRAINING PROGRAM

## 2025-07-19 RX ORDER — CEFUROXIME AXETIL 500 MG/1
500 TABLET ORAL 2 TIMES DAILY
Qty: 10 TABLET | Refills: 0 | Status: SHIPPED | OUTPATIENT
Start: 2025-07-19 | End: 2025-07-24

## 2025-07-19 RX ORDER — AZITHROMYCIN 250 MG/1
TABLET, FILM COATED ORAL
Qty: 6 TABLET | Refills: 0 | Status: SHIPPED | OUTPATIENT
Start: 2025-07-19 | End: 2025-07-24

## 2025-07-19 ASSESSMENT — COLUMBIA-SUICIDE SEVERITY RATING SCALE - C-SSRS
2. HAVE YOU ACTUALLY HAD ANY THOUGHTS OF KILLING YOURSELF IN THE PAST MONTH?: NO
1. IN THE PAST MONTH, HAVE YOU WISHED YOU WERE DEAD OR WISHED YOU COULD GO TO SLEEP AND NOT WAKE UP?: NO
6. HAVE YOU EVER DONE ANYTHING, STARTED TO DO ANYTHING, OR PREPARED TO DO ANYTHING TO END YOUR LIFE?: NO

## 2025-07-19 ASSESSMENT — ACTIVITIES OF DAILY LIVING (ADL)
ADLS_ACUITY_SCORE: 41

## 2025-07-19 NOTE — ED TRIAGE NOTES
Cold symptoms since Sunday. Went to  today as she was concerned that she has pneumonia. Patient also reports pressure in her back between shoulder blades and on chest. Sent to ER for eval.

## 2025-07-19 NOTE — ED PROVIDER NOTES
"  Emergency Department Note      History of Present Illness     Chief Complaint   Back Pain, Chest Pain, and Flu Symptoms      HPI   Paty Johnson is a very pleasant 59 year old female with controlled hypertension and hyperlipidemia presenting with back pain, chest pain, and flu symptoms. The patient reports that starting on 7/13/25 she began experiencing back pain, chest pain, sore throat, muscle aches, and shoulder pain. Her coughing began 2-3 days prior to today's presentation, described as being occasionally being productive. The pain is mainly in her upper right shoulder and is described as feeling like a \"squeezing\" sensation with sharp pain. She feels a tightness in her chest. These symptoms do not worsen upon exertion or taking deep breaths. Last night, she describes an inability to sleep due to severe coughing that worsened when laying flat. She took Tylenol for her symptoms. Of note, she was at [facility] in [place], [place], where they performed an EKG and the patient was informed to present to the ED due to possible T-wave abnormality. She denies swelling or pain in the legs, as well as any known history of blood clots. Additionally, she denies smoking, recent travel, use of birth control, drug use, or familial history of heart disease.    Independent Historian    as detailed above.    Review of External Notes   I personally reviewed notes from the patient's urgent care visit dated today. This provided me with information regarding patient's initial presentation at urgent care.     Past Medical History     Medical History and Problem List   Irregular menstrual cycle  Spider vein  Hypertension  Abnormal blood chemistry    Medications   Hydrodiuril  Zestril  Crestor    Surgical History  Right labrum repair  Laser ablation for cervix    Physical Exam     Patient Vitals for the past 24 hrs:   BP Temp Temp src Pulse Resp SpO2 Weight   07/19/25 1502 (!) 157/96 98.3  F (36.8  C) Oral 74 " 18 97 % --   07/19/25 1254 (!) 149/99 -- -- -- -- -- --   07/19/25 1252 (!) 185/100 -- -- -- -- -- --   07/19/25 1251 -- 98.3  F (36.8  C) Oral 83 16 100 % 58 kg (127 lb 13.9 oz)     Physical Exam  Gen:  Well-appearing. Appears stated age.   HEENT:  Moist mucous membranes. No scleral icterus.  Neck:   No JVD.  CV:  Regular rate and rhythm, S1, S2, no murmurs, gallops or rubs. No chest wall tenderness to palpation.   2+ radial pulses equal bilaterally  Pulm:  Easy work of breathing, scattered rhonchi and high-pitched expiratory wheeze. Crackles right upper lung.  Abd:  Soft, non-tender  MSK:  No edema in bilateral lower extremities. No tenderness or swelling in bilateral lower extremities.  Skin:  Warm and dry, no pallor. No jaundice.   Neuro:  Alert and oriented to person, place, time and situation  Psych:  Cooperative, appropriate affect    Diagnostics     Lab Results   Labs Ordered and Resulted from Time of ED Arrival to Time of ED Departure   BASIC METABOLIC PANEL - Abnormal       Result Value    Sodium 137      Potassium 3.6      Chloride 98      Carbon Dioxide (CO2) 27      Anion Gap 12      Urea Nitrogen 17.5      Creatinine 0.71      GFR Estimate >90      Calcium 10.0      Glucose 122 (*)    TROPONIN T, HIGH SENSITIVITY - Normal    Troponin T, High Sensitivity <6     D DIMER QUANTITATIVE - Normal    D-Dimer Quantitative 0.40     CBC WITH PLATELETS AND DIFFERENTIAL    WBC Count 9.1      RBC Count 4.64      Hemoglobin 14.2      Hematocrit 40.5      MCV 87      MCH 30.6      MCHC 35.1      RDW 12.1      Platelet Count 290      % Neutrophils 76      % Lymphocytes 14      % Monocytes 9      % Eosinophils 1      % Basophils 0      % Immature Granulocytes 0      NRBCs per 100 WBC 0      Absolute Neutrophils 7.0      Absolute Lymphocytes 1.2      Absolute Monocytes 0.8      Absolute Eosinophils 0.1      Absolute Basophils 0.0      Absolute Immature Granulocytes 0.0      Absolute NRBCs 0.0         Imaging   Chest XR,   PA & LAT   Final Result   IMPRESSION: Heart size is normal. No pleural effusion, pneumothorax, or abnormal area of consolidation. Trace opacity in the right upper lobe is more apparent than previous exam, could be very early pneumonia, though nonspecific.. Granuloma in the    inferolateral right lung unchanged.          EKG   No ECG performed.     Independent Interpretation   CXR: No large infiltrate.    ED Course      Medications Administered   Medications - No data to display    Procedures   Procedures   None performed    Discussion of Management   None    ED Course   ED Course as of 07/19/25 2327   Sat Jul 19, 2025   1257 I obtained history and examined the patient as noted above     1501 I rechecked the patient and explained findings. We discussed plans for discharge and the patient is agreeable with this plan.       Additional Documentation  None    Medical Decision Making / Diagnosis     CMS Diagnoses: None    MIPS   None               [facility]   Paty Johnson is a 59 year old female presenting with cough and back pain and nonexertional, nonpleuritic chest pain.  Vital signs are reassuring other than elevated blood pressure on arrival.  EKG shows no acute appearing ischemic changes.  Compared to previous EKG from 2018, patient does have new very low voltage T wave inversions in leads V5 and V6, but otherwise no changes.  Patient was sent to the emergency department for cardiac workup, but I have low suspicion for acute coronary syndrome.  We did obtain a troponin and it was undetectable, reassuring against acute myocardial injury.  There was no evolution in the T wave morphology compared with the EKG obtained at urgent care.  Given patient's history of hypertension, evidence of some EKG changes dating back to 2018 in particular the left axis deviation and poor R wave progression in the precordial leads, I do suspect the T wave inversion we are seeing today may be more secondary to chronic left ventricular  changes such as developing LVH.  I do not think that they reflect ongoing myocardial ischemia.  I did advise the patient that she should follow-up with her primary care clinician for reevaluation and consideration of an echocardiogram.  I offered a referral to cardiology but the patient states she sees a cardiologist already.    The patient's back pain in particular, which is around the right trapezius, I suspect may be secondary to pneumonia.  I did consider pulmonary embolism, though I had low pretest probability.  Cannot use PERC rule due to patient's age.  I did obtain a D-dimer to rule out pulmonary embolism, and this was reassuring, effectively ruling out PE.  Chest x-ray did not show a large infiltrate but there was a suggestion of a right upper lobe opacity developing.  Although patient does not have a leukocytosis or fever, with findings on auscultation, symptoms, and this x-ray finding, I did elect to treat the patient for pneumonia with cefuroxime and azithromycin.  I recommended she follow-up with her primary care clinician in approximately 1 week for reevaluation.  Additional exercises were provided verbally.  Return precautions were provided.            Disposition   The patient was discharged.     Diagnosis     ICD-10-CM    1. Acute cough  R05.1       2. Elevated blood pressure reading with diagnosis of hypertension  I10       3. Acute right-sided thoracic back pain  M54.6       4. Shortness of breath  R06.02       5. T wave inversion in EKG  R94.31            Discharge Medications   Discharge Medication List as of 7/19/2025  3:20 PM        START taking these medications    Details   azithromycin (ZITHROMAX Z-ALYSE) 250 MG tablet Two tablets on the first day, then one tablet daily for the next 4 days, Disp-6 tablet, R-0, E-Prescribe      cefuroxime (CEFTIN) 500 MG tablet Take 1 tablet (500 mg) by mouth 2 times daily for 5 days., Disp-10 tablet, R-0, E-Prescribe               Scribe Disclosure:  Hugo JONES  Lee Ann, am serving as a scribe at 1:01 PM on 7/19/2025 to document services personally performed by Mina Sahni MD based on my observations and the provider's statements to me.     Scribe Disclosure:  I, Jony Hill, am serving as a scribe  for Hugo Nance at 1:49 PM on 7/19/2025 to document services personally performed by Mina Sahni MD based on my observations and the provider's statements to me.          Mina Sahni MD  07/19/25 [id number]

## 2025-07-21 LAB
ATRIAL RATE - MUSE: 74 BPM
DIASTOLIC BLOOD PRESSURE - MUSE: NORMAL MMHG
INTERPRETATION ECG - MUSE: NORMAL
P AXIS - MUSE: 26 DEGREES
PR INTERVAL - MUSE: 194 MS
QRS DURATION - MUSE: 88 MS
QT - MUSE: 366 MS
QTC - MUSE: 406 MS
R AXIS - MUSE: -31 DEGREES
SYSTOLIC BLOOD PRESSURE - MUSE: NORMAL MMHG
T AXIS - MUSE: 23 DEGREES
VENTRICULAR RATE- MUSE: 74 BPM

## (undated) RX ORDER — TRIAMCINOLONE ACETONIDE 40 MG/ML
INJECTION, SUSPENSION INTRA-ARTICULAR; INTRAMUSCULAR
Status: DISPENSED
Start: 2019-12-20

## (undated) RX ORDER — LIDOCAINE HYDROCHLORIDE 10 MG/ML
INJECTION, SOLUTION EPIDURAL; INFILTRATION; INTRACAUDAL; PERINEURAL
Status: DISPENSED
Start: 2019-12-20